# Patient Record
Sex: MALE | Race: WHITE | Employment: FULL TIME | URBAN - METROPOLITAN AREA
[De-identification: names, ages, dates, MRNs, and addresses within clinical notes are randomized per-mention and may not be internally consistent; named-entity substitution may affect disease eponyms.]

---

## 2020-03-12 ENCOUNTER — HOSPITAL ENCOUNTER (INPATIENT)
Age: 30
LOS: 2 days | Discharge: HOME | DRG: 638 | End: 2020-03-14
Attending: INTERNAL MEDICINE | Admitting: INTERNAL MEDICINE
Payer: COMMERCIAL

## 2020-03-12 ENCOUNTER — APPOINTMENT (OUTPATIENT)
Dept: GENERAL RADIOLOGY | Age: 30
DRG: 638 | End: 2020-03-12
Attending: INTERNAL MEDICINE
Payer: COMMERCIAL

## 2020-03-12 ENCOUNTER — APPOINTMENT (OUTPATIENT)
Dept: GENERAL RADIOLOGY | Age: 30
End: 2020-03-12
Payer: COMMERCIAL

## 2020-03-12 ENCOUNTER — HOSPITAL ENCOUNTER (EMERGENCY)
Age: 30
Discharge: HOME | End: 2020-03-12
Attending: FAMILY MEDICINE
Payer: COMMERCIAL

## 2020-03-12 ENCOUNTER — HOSPITAL ENCOUNTER (OUTPATIENT)
Age: 30
Discharge: HOME | End: 2020-03-12
Payer: COMMERCIAL

## 2020-03-12 VITALS
TEMPERATURE: 98.4 F | HEART RATE: 113 BPM | OXYGEN SATURATION: 100 % | BODY MASS INDEX: 31.39 KG/M2 | HEIGHT: 67 IN | SYSTOLIC BLOOD PRESSURE: 120 MMHG | DIASTOLIC BLOOD PRESSURE: 65 MMHG | RESPIRATION RATE: 24 BRPM | WEIGHT: 200 LBS

## 2020-03-12 PROBLEM — E11.10 DKA, TYPE 2, NOT AT GOAL (HCC): Status: ACTIVE | Noted: 2020-03-12

## 2020-03-12 LAB
ALBUMIN SERPL-MCNC: 5.3 G/DL (ref 3.5–5.2)
ALP BLD-CCNC: 110 U/L (ref 40–129)
ALT SERPL-CCNC: 19 U/L (ref 0–40)
AMPHETAMINE SCREEN, URINE: NOT DETECTED
ANION GAP SERPL CALCULATED.3IONS-SCNC: 24 MMOL/L (ref 7–16)
ANION GAP SERPL CALCULATED.3IONS-SCNC: 30 MMOL/L (ref 7–16)
ANION GAP SERPL CALCULATED.3IONS-SCNC: 30 MMOL/L (ref 7–16)
ANION GAP SERPL CALCULATED.3IONS-SCNC: 34 MMOL/L (ref 7–16)
AST SERPL-CCNC: 14 U/L (ref 0–39)
BACTERIA: ABNORMAL /HPF
BARBITURATE SCREEN URINE: NOT DETECTED
BASOPHILS ABSOLUTE: 0.14 E9/L (ref 0–0.2)
BASOPHILS RELATIVE PERCENT: 1.1 % (ref 0–2)
BENZODIAZEPINE SCREEN, URINE: NOT DETECTED
BETA-HYDROXYBUTYRATE: >4.5 MMOL/L (ref 0.02–0.27)
BILIRUB SERPL-MCNC: 0.7 MG/DL (ref 0–1.2)
BILIRUBIN URINE: ABNORMAL
BLOOD, URINE: ABNORMAL
BUN BLDV-MCNC: 10 MG/DL (ref 6–20)
BUN BLDV-MCNC: 11 MG/DL (ref 6–20)
BUN BLDV-MCNC: 6 MG/DL (ref 6–20)
BUN BLDV-MCNC: 8 MG/DL (ref 6–20)
CALCIUM SERPL-MCNC: 7.7 MG/DL (ref 8.6–10.2)
CALCIUM SERPL-MCNC: 8.1 MG/DL (ref 8.6–10.2)
CALCIUM SERPL-MCNC: 8.4 MG/DL (ref 8.6–10.2)
CALCIUM SERPL-MCNC: 9.8 MG/DL (ref 8.6–10.2)
CANNABINOID SCREEN URINE: POSITIVE
CHLORIDE BLD-SCNC: 100 MMOL/L (ref 98–107)
CHLORIDE BLD-SCNC: 106 MMOL/L (ref 98–107)
CHLORIDE BLD-SCNC: 92 MMOL/L (ref 98–107)
CHLORIDE BLD-SCNC: 97 MMOL/L (ref 98–107)
CHP ED QC CHECK: YES
CHP ED QC CHECK: YES
CLARITY: CLEAR
CO2: 3 MMOL/L (ref 22–29)
CO2: 5 MMOL/L (ref 22–29)
CO2: 6 MMOL/L (ref 22–29)
CO2: 6 MMOL/L (ref 22–29)
COCAINE METABOLITE SCREEN URINE: NOT DETECTED
COLOR: YELLOW
CREAT SERPL-MCNC: 0.8 MG/DL (ref 0.7–1.2)
CREAT SERPL-MCNC: 0.9 MG/DL (ref 0.7–1.2)
CREAT SERPL-MCNC: 0.9 MG/DL (ref 0.7–1.2)
CREAT SERPL-MCNC: 1 MG/DL (ref 0.7–1.2)
D DIMER: <200 NG/ML DDU
EKG ATRIAL RATE: 122 BPM
EKG P AXIS: 73 DEGREES
EKG P-R INTERVAL: 120 MS
EKG Q-T INTERVAL: 342 MS
EKG QRS DURATION: 86 MS
EKG QTC CALCULATION (BAZETT): 487 MS
EKG R AXIS: 63 DEGREES
EKG T AXIS: 52 DEGREES
EKG VENTRICULAR RATE: 122 BPM
EOSINOPHILS ABSOLUTE: 0.06 E9/L (ref 0.05–0.5)
EOSINOPHILS RELATIVE PERCENT: 0.5 % (ref 0–6)
FENTANYL SCREEN, URINE: NOT DETECTED
GFR AFRICAN AMERICAN: >60
GFR NON-AFRICAN AMERICAN: >60 ML/MIN/1.73
GLUCOSE BLD-MCNC: 106 MG/DL (ref 74–99)
GLUCOSE BLD-MCNC: 184 MG/DL
GLUCOSE BLD-MCNC: 209 MG/DL (ref 74–99)
GLUCOSE BLD-MCNC: 221 MG/DL
GLUCOSE BLD-MCNC: 303 MG/DL (ref 74–99)
GLUCOSE BLD-MCNC: 366 MG/DL (ref 74–99)
GLUCOSE URINE: 100 MG/DL
HBA1C MFR BLD: 11.7 % (ref 4–5.6)
HCT VFR BLD CALC: 52 % (ref 37–54)
HEMOGLOBIN: 18.8 G/DL (ref 12.5–16.5)
IMMATURE GRANULOCYTES #: 0.21 E9/L
IMMATURE GRANULOCYTES %: 1.6 % (ref 0–5)
INFLUENZA A BY PCR: NOT DETECTED
INFLUENZA B BY PCR: NOT DETECTED
KETONES, URINE: >=80 MG/DL
LACTIC ACID: 1.5 MMOL/L (ref 0.5–2.2)
LEUKOCYTE ESTERASE, URINE: NEGATIVE
LIPASE: 45 U/L (ref 13–60)
LYMPHOCYTES ABSOLUTE: 2.31 E9/L (ref 1.5–4)
LYMPHOCYTES RELATIVE PERCENT: 17.6 % (ref 20–42)
Lab: ABNORMAL
MAGNESIUM: 1.8 MG/DL (ref 1.6–2.6)
MCH RBC QN AUTO: 31.5 PG (ref 26–35)
MCHC RBC AUTO-ENTMCNC: 36.2 % (ref 32–34.5)
MCV RBC AUTO: 87.1 FL (ref 80–99.9)
METER GLUCOSE: 119 MG/DL (ref 74–99)
METER GLUCOSE: 148 MG/DL (ref 74–99)
METER GLUCOSE: 150 MG/DL (ref 74–99)
METER GLUCOSE: 158 MG/DL (ref 74–99)
METER GLUCOSE: 184 MG/DL (ref 74–99)
METER GLUCOSE: 221 MG/DL (ref 74–99)
METER GLUCOSE: 229 MG/DL (ref 74–99)
METHADONE SCREEN, URINE: NOT DETECTED
MONOCYTES ABSOLUTE: 0.93 E9/L (ref 0.1–0.95)
MONOCYTES RELATIVE PERCENT: 7.1 % (ref 2–12)
NEUTROPHILS ABSOLUTE: 9.46 E9/L (ref 1.8–7.3)
NEUTROPHILS RELATIVE PERCENT: 72.1 % (ref 43–80)
NITRITE, URINE: NEGATIVE
OPIATE SCREEN URINE: NOT DETECTED
OXYCODONE URINE: NOT DETECTED
PDW BLD-RTO: 13.2 FL (ref 11.5–15)
PH UA: 5.5 (ref 5–9)
PH VENOUS: 7.08 (ref 7.35–7.45)
PHENCYCLIDINE SCREEN URINE: NOT DETECTED
PHOSPHORUS: 1 MG/DL (ref 2.5–4.5)
PLATELET # BLD: 201 E9/L (ref 130–450)
PMV BLD AUTO: 10.2 FL (ref 7–12)
POTASSIUM SERPL-SCNC: 3.5 MMOL/L (ref 3.5–5)
POTASSIUM SERPL-SCNC: 4 MMOL/L (ref 3.5–5)
POTASSIUM SERPL-SCNC: 4.2 MMOL/L (ref 3.5–5)
POTASSIUM SERPL-SCNC: 4.7 MMOL/L (ref 3.5–5)
PROTEIN UA: 30 MG/DL
RBC # BLD: 5.97 E12/L (ref 3.8–5.8)
RBC UA: ABNORMAL /HPF (ref 0–2)
SODIUM BLD-SCNC: 131 MMOL/L (ref 132–146)
SODIUM BLD-SCNC: 133 MMOL/L (ref 132–146)
SODIUM BLD-SCNC: 133 MMOL/L (ref 132–146)
SODIUM BLD-SCNC: 136 MMOL/L (ref 132–146)
SPECIFIC GRAVITY UA: >=1.03 (ref 1–1.03)
STREP GRP A PCR: NEGATIVE
TOTAL PROTEIN: 8.5 G/DL (ref 6.4–8.3)
UROBILINOGEN, URINE: 0.2 E.U./DL
WBC # BLD: 13.1 E9/L (ref 4.5–11.5)
WBC UA: ABNORMAL /HPF (ref 0–5)

## 2020-03-12 PROCEDURE — 6360000002 HC RX W HCPCS: Performed by: INTERNAL MEDICINE

## 2020-03-12 PROCEDURE — 6360000002 HC RX W HCPCS: Performed by: FAMILY MEDICINE

## 2020-03-12 PROCEDURE — 71045 X-RAY EXAM CHEST 1 VIEW: CPT

## 2020-03-12 PROCEDURE — 0100U HC RESPIRPTHGN MULT REV TRANS & AMP PRB TECH 21 TRGT: CPT

## 2020-03-12 PROCEDURE — 2580000003 HC RX 258: Performed by: STUDENT IN AN ORGANIZED HEALTH CARE EDUCATION/TRAINING PROGRAM

## 2020-03-12 PROCEDURE — 2000000000 HC ICU R&B

## 2020-03-12 PROCEDURE — 87502 INFLUENZA DNA AMP PROBE: CPT

## 2020-03-12 PROCEDURE — 82962 GLUCOSE BLOOD TEST: CPT

## 2020-03-12 PROCEDURE — 85378 FIBRIN DEGRADE SEMIQUANT: CPT

## 2020-03-12 PROCEDURE — 2580000003 HC RX 258: Performed by: FAMILY MEDICINE

## 2020-03-12 PROCEDURE — 2500000003 HC RX 250 WO HCPCS

## 2020-03-12 PROCEDURE — 36415 COLL VENOUS BLD VENIPUNCTURE: CPT

## 2020-03-12 PROCEDURE — 80307 DRUG TEST PRSMV CHEM ANLYZR: CPT

## 2020-03-12 PROCEDURE — 02HV33Z INSERTION OF INFUSION DEVICE INTO SUPERIOR VENA CAVA, PERCUTANEOUS APPROACH: ICD-10-PCS | Performed by: INTERNAL MEDICINE

## 2020-03-12 PROCEDURE — 96368 THER/DIAG CONCURRENT INF: CPT

## 2020-03-12 PROCEDURE — 83690 ASSAY OF LIPASE: CPT

## 2020-03-12 PROCEDURE — 81001 URINALYSIS AUTO W/SCOPE: CPT

## 2020-03-12 PROCEDURE — 99291 CRITICAL CARE FIRST HOUR: CPT | Performed by: INTERNAL MEDICINE

## 2020-03-12 PROCEDURE — C9113 INJ PANTOPRAZOLE SODIUM, VIA: HCPCS | Performed by: FAMILY MEDICINE

## 2020-03-12 PROCEDURE — 87880 STREP A ASSAY W/OPTIC: CPT

## 2020-03-12 PROCEDURE — 96365 THER/PROPH/DIAG IV INF INIT: CPT

## 2020-03-12 PROCEDURE — 85025 COMPLETE CBC W/AUTO DIFF WBC: CPT

## 2020-03-12 PROCEDURE — 80053 COMPREHEN METABOLIC PANEL: CPT

## 2020-03-12 PROCEDURE — G0480 DRUG TEST DEF 1-7 CLASSES: HCPCS

## 2020-03-12 PROCEDURE — 96366 THER/PROPH/DIAG IV INF ADDON: CPT

## 2020-03-12 PROCEDURE — 87081 CULTURE SCREEN ONLY: CPT

## 2020-03-12 PROCEDURE — 87088 URINE BACTERIA CULTURE: CPT

## 2020-03-12 PROCEDURE — 6370000000 HC RX 637 (ALT 250 FOR IP): Performed by: INTERNAL MEDICINE

## 2020-03-12 PROCEDURE — 96375 TX/PRO/DX INJ NEW DRUG ADDON: CPT

## 2020-03-12 PROCEDURE — 6370000000 HC RX 637 (ALT 250 FOR IP): Performed by: FAMILY MEDICINE

## 2020-03-12 PROCEDURE — 93005 ELECTROCARDIOGRAM TRACING: CPT | Performed by: FAMILY MEDICINE

## 2020-03-12 PROCEDURE — 36592 COLLECT BLOOD FROM PICC: CPT

## 2020-03-12 PROCEDURE — 80048 BASIC METABOLIC PNL TOTAL CA: CPT

## 2020-03-12 PROCEDURE — A0426 ALS 1: HCPCS

## 2020-03-12 PROCEDURE — 2580000003 HC RX 258: Performed by: INTERNAL MEDICINE

## 2020-03-12 PROCEDURE — 93010 ELECTROCARDIOGRAM REPORT: CPT | Performed by: INTERNAL MEDICINE

## 2020-03-12 PROCEDURE — 99285 EMERGENCY DEPT VISIT HI MDM: CPT

## 2020-03-12 PROCEDURE — 2500000003 HC RX 250 WO HCPCS: Performed by: FAMILY MEDICINE

## 2020-03-12 PROCEDURE — 6360000002 HC RX W HCPCS

## 2020-03-12 PROCEDURE — 83735 ASSAY OF MAGNESIUM: CPT

## 2020-03-12 PROCEDURE — 83036 HEMOGLOBIN GLYCOSYLATED A1C: CPT

## 2020-03-12 PROCEDURE — 2500000003 HC RX 250 WO HCPCS: Performed by: INTERNAL MEDICINE

## 2020-03-12 PROCEDURE — 2580000003 HC RX 258

## 2020-03-12 PROCEDURE — 82010 KETONE BODYS QUAN: CPT

## 2020-03-12 PROCEDURE — 82800 BLOOD PH: CPT

## 2020-03-12 PROCEDURE — 84100 ASSAY OF PHOSPHORUS: CPT

## 2020-03-12 PROCEDURE — 2500000003 HC RX 250 WO HCPCS: Performed by: STUDENT IN AN ORGANIZED HEALTH CARE EDUCATION/TRAINING PROGRAM

## 2020-03-12 PROCEDURE — 83605 ASSAY OF LACTIC ACID: CPT

## 2020-03-12 PROCEDURE — A0425 GROUND MILEAGE: HCPCS

## 2020-03-12 RX ORDER — POTASSIUM CHLORIDE 29.8 MG/ML
20 INJECTION INTRAVENOUS PRN
Status: DISCONTINUED | OUTPATIENT
Start: 2020-03-12 | End: 2020-03-14 | Stop reason: HOSPADM

## 2020-03-12 RX ORDER — FENTANYL CITRATE 50 UG/ML
INJECTION, SOLUTION INTRAMUSCULAR; INTRAVENOUS
Status: COMPLETED
Start: 2020-03-12 | End: 2020-03-12

## 2020-03-12 RX ORDER — DEXTROSE MONOHYDRATE 25 G/50ML
12.5 INJECTION, SOLUTION INTRAVENOUS PRN
Status: DISCONTINUED | OUTPATIENT
Start: 2020-03-12 | End: 2020-03-14 | Stop reason: HOSPADM

## 2020-03-12 RX ORDER — 0.9 % SODIUM CHLORIDE 0.9 %
15 INTRAVENOUS SOLUTION INTRAVENOUS ONCE
Status: DISCONTINUED | OUTPATIENT
Start: 2020-03-12 | End: 2020-03-13

## 2020-03-12 RX ORDER — SODIUM CHLORIDE 0.9 % (FLUSH) 0.9 %
SYRINGE (ML) INJECTION
Status: COMPLETED
Start: 2020-03-12 | End: 2020-03-12

## 2020-03-12 RX ORDER — DEXTROSE, SODIUM CHLORIDE, AND POTASSIUM CHLORIDE 5; .45; .15 G/100ML; G/100ML; G/100ML
INJECTION INTRAVENOUS CONTINUOUS
Status: DISCONTINUED | OUTPATIENT
Start: 2020-03-12 | End: 2020-03-12

## 2020-03-12 RX ORDER — IPRATROPIUM BROMIDE AND ALBUTEROL SULFATE 2.5; .5 MG/3ML; MG/3ML
1 SOLUTION RESPIRATORY (INHALATION) ONCE
Status: COMPLETED | OUTPATIENT
Start: 2020-03-12 | End: 2020-03-12

## 2020-03-12 RX ORDER — MAGNESIUM SULFATE 1 G/100ML
1 INJECTION INTRAVENOUS PRN
Status: DISCONTINUED | OUTPATIENT
Start: 2020-03-12 | End: 2020-03-12 | Stop reason: SDUPTHER

## 2020-03-12 RX ORDER — DEXTROSE MONOHYDRATE 25 G/50ML
12.5 INJECTION, SOLUTION INTRAVENOUS PRN
Status: DISCONTINUED | OUTPATIENT
Start: 2020-03-12 | End: 2020-03-12 | Stop reason: SDUPTHER

## 2020-03-12 RX ORDER — POTASSIUM CHLORIDE 7.45 MG/ML
10 INJECTION INTRAVENOUS ONCE
Status: COMPLETED | OUTPATIENT
Start: 2020-03-12 | End: 2020-03-12

## 2020-03-12 RX ORDER — SODIUM CHLORIDE 9 MG/ML
INJECTION, SOLUTION INTRAVENOUS CONTINUOUS
Status: DISCONTINUED | OUTPATIENT
Start: 2020-03-12 | End: 2020-03-12

## 2020-03-12 RX ORDER — PANTOPRAZOLE SODIUM 40 MG/10ML
40 INJECTION, POWDER, LYOPHILIZED, FOR SOLUTION INTRAVENOUS ONCE
Status: COMPLETED | OUTPATIENT
Start: 2020-03-12 | End: 2020-03-12

## 2020-03-12 RX ORDER — POTASSIUM CHLORIDE 7.45 MG/ML
10 INJECTION INTRAVENOUS PRN
Status: DISCONTINUED | OUTPATIENT
Start: 2020-03-12 | End: 2020-03-12

## 2020-03-12 RX ORDER — 0.9 % SODIUM CHLORIDE 0.9 %
2000 INTRAVENOUS SOLUTION INTRAVENOUS ONCE
Status: COMPLETED | OUTPATIENT
Start: 2020-03-12 | End: 2020-03-12

## 2020-03-12 RX ORDER — 0.9 % SODIUM CHLORIDE 0.9 %
1000 INTRAVENOUS SOLUTION INTRAVENOUS ONCE
Status: COMPLETED | OUTPATIENT
Start: 2020-03-12 | End: 2020-03-12

## 2020-03-12 RX ORDER — FENTANYL CITRATE 50 UG/ML
25 INJECTION, SOLUTION INTRAMUSCULAR; INTRAVENOUS ONCE
Status: COMPLETED | OUTPATIENT
Start: 2020-03-12 | End: 2020-03-12

## 2020-03-12 RX ORDER — 0.9 % SODIUM CHLORIDE 0.9 %
15 INTRAVENOUS SOLUTION INTRAVENOUS ONCE
Status: DISCONTINUED | OUTPATIENT
Start: 2020-03-12 | End: 2020-03-12 | Stop reason: SDUPTHER

## 2020-03-12 RX ORDER — LORAZEPAM 2 MG/ML
2 INJECTION INTRAMUSCULAR ONCE
Status: COMPLETED | OUTPATIENT
Start: 2020-03-12 | End: 2020-03-12

## 2020-03-12 RX ORDER — DEXTROSE AND SODIUM CHLORIDE 5; .45 G/100ML; G/100ML
INJECTION, SOLUTION INTRAVENOUS CONTINUOUS PRN
Status: DISCONTINUED | OUTPATIENT
Start: 2020-03-12 | End: 2020-03-12

## 2020-03-12 RX ORDER — KETOROLAC TROMETHAMINE 30 MG/ML
30 INJECTION, SOLUTION INTRAMUSCULAR; INTRAVENOUS ONCE
Status: COMPLETED | OUTPATIENT
Start: 2020-03-12 | End: 2020-03-12

## 2020-03-12 RX ORDER — MAGNESIUM SULFATE 1 G/100ML
1 INJECTION INTRAVENOUS PRN
Status: DISCONTINUED | OUTPATIENT
Start: 2020-03-12 | End: 2020-03-13

## 2020-03-12 RX ORDER — DEXTROSE, SODIUM CHLORIDE, AND POTASSIUM CHLORIDE 5; .45; .15 G/100ML; G/100ML; G/100ML
INJECTION INTRAVENOUS ONCE
Status: DISCONTINUED | OUTPATIENT
Start: 2020-03-12 | End: 2020-03-12

## 2020-03-12 RX ORDER — DEXTROSE, SODIUM CHLORIDE, AND POTASSIUM CHLORIDE 5; .45; .15 G/100ML; G/100ML; G/100ML
INJECTION INTRAVENOUS ONCE
Status: DISCONTINUED | OUTPATIENT
Start: 2020-03-12 | End: 2020-03-12 | Stop reason: HOSPADM

## 2020-03-12 RX ORDER — DEXTROSE, SODIUM CHLORIDE, AND POTASSIUM CHLORIDE 5; .45; .15 G/100ML; G/100ML; G/100ML
INJECTION INTRAVENOUS ONCE
Status: COMPLETED | OUTPATIENT
Start: 2020-03-12 | End: 2020-03-12

## 2020-03-12 RX ORDER — DIMETHICONE, OXYBENZONE, AND PADIMATE O 2; 2.5; 6.6 G/100G; G/100G; G/100G
STICK TOPICAL PRN
Status: DISCONTINUED | OUTPATIENT
Start: 2020-03-12 | End: 2020-03-14 | Stop reason: HOSPADM

## 2020-03-12 RX ORDER — PROCHLORPERAZINE EDISYLATE 5 MG/ML
10 INJECTION INTRAMUSCULAR; INTRAVENOUS ONCE
Status: COMPLETED | OUTPATIENT
Start: 2020-03-12 | End: 2020-03-12

## 2020-03-12 RX ORDER — LORAZEPAM 2 MG/ML
INJECTION INTRAMUSCULAR
Status: COMPLETED
Start: 2020-03-12 | End: 2020-03-12

## 2020-03-12 RX ORDER — SODIUM CHLORIDE 9 MG/ML
INJECTION, SOLUTION INTRAVENOUS CONTINUOUS
Status: DISCONTINUED | OUTPATIENT
Start: 2020-03-12 | End: 2020-03-12 | Stop reason: SDUPTHER

## 2020-03-12 RX ADMIN — SODIUM CHLORIDE 2000 ML: 9 INJECTION, SOLUTION INTRAVENOUS at 08:10

## 2020-03-12 RX ADMIN — DEXTROSE, SODIUM CHLORIDE, AND POTASSIUM CHLORIDE: 5; .45; .15 INJECTION INTRAVENOUS at 13:51

## 2020-03-12 RX ADMIN — SODIUM CHLORIDE, PRESERVATIVE FREE 10 ML: 5 INJECTION INTRAVENOUS at 20:23

## 2020-03-12 RX ADMIN — FENTANYL CITRATE 25 MCG: 50 INJECTION, SOLUTION INTRAMUSCULAR; INTRAVENOUS at 18:52

## 2020-03-12 RX ADMIN — PANTOPRAZOLE SODIUM 40 MG: 40 INJECTION, POWDER, FOR SOLUTION INTRAVENOUS at 08:15

## 2020-03-12 RX ADMIN — SODIUM PHOSPHATE, MONOBASIC, MONOHYDRATE 20 MMOL: 276; 142 INJECTION, SOLUTION INTRAVENOUS at 21:43

## 2020-03-12 RX ADMIN — LORAZEPAM 2 MG: 2 INJECTION INTRAMUSCULAR; INTRAVENOUS at 18:52

## 2020-03-12 RX ADMIN — DEXTROSE, SODIUM CHLORIDE, AND POTASSIUM CHLORIDE: 5; .45; .15 INJECTION INTRAVENOUS at 13:40

## 2020-03-12 RX ADMIN — SODIUM CHLORIDE 1000 ML: 9 INJECTION, SOLUTION INTRAVENOUS at 11:45

## 2020-03-12 RX ADMIN — LORAZEPAM 2 MG: 2 INJECTION INTRAMUSCULAR at 18:52

## 2020-03-12 RX ADMIN — IPRATROPIUM BROMIDE AND ALBUTEROL SULFATE 1 AMPULE: .5; 3 SOLUTION RESPIRATORY (INHALATION) at 09:56

## 2020-03-12 RX ADMIN — SODIUM CHLORIDE 8.6 UNITS/HR: 9 INJECTION, SOLUTION INTRAVENOUS at 18:50

## 2020-03-12 RX ADMIN — POTASSIUM CHLORIDE 10 MEQ: 7.46 INJECTION, SOLUTION INTRAVENOUS at 11:42

## 2020-03-12 RX ADMIN — SODIUM BICARBONATE 150 MEQ: 84 INJECTION INTRAVENOUS at 15:36

## 2020-03-12 RX ADMIN — LIDOCAINE HYDROCHLORIDE: 20 SOLUTION ORAL; TOPICAL at 08:18

## 2020-03-12 RX ADMIN — INSULIN HUMAN 9 UNITS: 100 INJECTION, SOLUTION PARENTERAL at 11:53

## 2020-03-12 RX ADMIN — POTASSIUM CHLORIDE 20 MEQ: 29.8 INJECTION, SOLUTION INTRAVENOUS at 21:47

## 2020-03-12 RX ADMIN — SODIUM CHLORIDE 9.1 UNITS: 9 INJECTION, SOLUTION INTRAVENOUS at 12:02

## 2020-03-12 RX ADMIN — ENOXAPARIN SODIUM 40 MG: 40 INJECTION SUBCUTANEOUS at 20:29

## 2020-03-12 RX ADMIN — KETOROLAC TROMETHAMINE 30 MG: 30 INJECTION, SOLUTION INTRAMUSCULAR at 08:13

## 2020-03-12 RX ADMIN — SODIUM BICARBONATE: 84 INJECTION, SOLUTION INTRAVENOUS at 20:23

## 2020-03-12 RX ADMIN — Medication: at 23:57

## 2020-03-12 RX ADMIN — POTASSIUM CHLORIDE 20 MEQ: 29.8 INJECTION, SOLUTION INTRAVENOUS at 21:17

## 2020-03-12 RX ADMIN — PROCHLORPERAZINE EDISYLATE 10 MG: 5 INJECTION INTRAMUSCULAR; INTRAVENOUS at 08:13

## 2020-03-12 ASSESSMENT — PAIN DESCRIPTION - DESCRIPTORS
DESCRIPTORS: THROBBING;TIGHTNESS
DESCRIPTORS: CONSTANT;DISCOMFORT
DESCRIPTORS: HEADACHE;SORE
DESCRIPTORS: CRAMPING

## 2020-03-12 ASSESSMENT — PAIN DESCRIPTION - PAIN TYPE
TYPE: ACUTE PAIN

## 2020-03-12 ASSESSMENT — PAIN DESCRIPTION - LOCATION
LOCATION: HEAD;THROAT
LOCATION: HEAD;THROAT
LOCATION: ABDOMEN
LOCATION: HEAD

## 2020-03-12 ASSESSMENT — PAIN SCALES - GENERAL
PAINLEVEL_OUTOF10: 0
PAINLEVEL_OUTOF10: 2
PAINLEVEL_OUTOF10: 4
PAINLEVEL_OUTOF10: 6
PAINLEVEL_OUTOF10: 0
PAINLEVEL_OUTOF10: 2
PAINLEVEL_OUTOF10: 6
PAINLEVEL_OUTOF10: 0
PAINLEVEL_OUTOF10: 2

## 2020-03-12 ASSESSMENT — PAIN DESCRIPTION - ONSET
ONSET: ON-GOING
ONSET: ON-GOING
ONSET: GRADUAL

## 2020-03-12 ASSESSMENT — PAIN DESCRIPTION - FREQUENCY
FREQUENCY: CONTINUOUS
FREQUENCY: CONTINUOUS
FREQUENCY: INTERMITTENT
FREQUENCY: CONTINUOUS

## 2020-03-12 ASSESSMENT — PAIN DESCRIPTION - ORIENTATION: ORIENTATION: RIGHT;LEFT

## 2020-03-12 ASSESSMENT — PAIN - FUNCTIONAL ASSESSMENT: PAIN_FUNCTIONAL_ASSESSMENT: 0-10

## 2020-03-12 ASSESSMENT — PAIN DESCRIPTION - PROGRESSION
CLINICAL_PROGRESSION: GRADUALLY IMPROVING
CLINICAL_PROGRESSION: NOT CHANGED

## 2020-03-12 NOTE — CONSULTS
polydipsia  MUSCULOSKELETAL:  negative  NEUROLOGICAL:  negative  BEHAVIOR/PSYCH:  negative    Lines and Devices   Right femoral central line placed 2020    Mechanical Ventilation Data   VENT SETTINGS (Comprehensive)     Additional Respiratory  Assessments  Pulse: 112  Resp: 23  SpO2: 99 %    ABG  Lab Results   Component Value Date    PH 7.349 2016    PCO2 35.4 2016    PO2 80.8 2016    HCO3 19.1 2016    O2SAT 96.1 2016     Lab Results   Component Value Date    MODE NC- 5 L 2016           Vitals    height is 5' 7\" (1.702 m) and weight is 189 lb 13.1 oz (86.1 kg). His blood pressure is 126/85 and his pulse is 112. His respiration is 23 and oxygen saturation is 99%. Temperature Range:   Temp  Av.4 °F (36.9 °C)  Min: 98.4 °F (36.9 °C)  Max: 98.5 °F (36.9 °C)  BP Range:  Systolic (49BDI), TMP:330 , Min:120 , ZQH:595     Diastolic (00SSR), XDD:40, Min:65, Max:98    Pulse Range: Pulse  Av.1  Min: 100  Max: 134  Respiration Range: Resp  Av.7  Min: 23  Max: 30  Current Pulse Ox[de-identified]  SpO2: 99 %  24HR Pulse Ox Range:  SpO2  Av.4 %  Min: 98 %  Max: 100 %  Oxygen Amount and Delivery:        I/O (24 Hours)    Patient Vitals for the past 8 hrs:   BP Pulse Resp SpO2 Height Weight   20 1900 126/85 112 23 99 % -- --   20 1700 -- -- -- -- 5' 7\" (1.702 m) 189 lb 13.1 oz (86.1 kg)     No intake or output data in the 24 hours ending 20  No intake/output data recorded. Patient Vitals for the past 96 hrs (Last 3 readings):   Weight   20 1700 189 lb 13.1 oz (86.1 kg)         Drains/Tubes Outputs  n/a  Exam         PHYSICAL EXAM:    General appearance - alert, well appearing, and in no distress and oriented to person, place, and time. Anxious appearing.   Mental status - alert, oriented to person, place, and time, normal mood, behavior, speech, dress, motor activity, and thought processes  Eyes - pupils equal and reactive, extraocular eye 2020    LACTA 1.1 2015        BNP   No results for input(s): BNP in the last 72 hours. Cultures     No results for input(s): BC in the last 72 hours. No results for input(s): Alona Amado in the last 72 hours. No results for input(s): LABURIN in the last 72 hours. Radiology   Xr Chest Portable    Result Date: 3/12/2020  Patient MRN:  [id number] : 1990 Age: 30 years Gender: Male Order Date:  3/12/2020 8:00 AM EXAM: XR CHEST PORTABLE NUMBER OF IMAGES:  1 INDICATION:  sob sob COMPARISON: 2016 FINDINGS: The heart is normal in size. There is a normal appearance to the pulmonary vasculature. No focal airspace opacity or evidence of pleural effusion. No pneumothorax. No free air beneath the diaphragm. No airspace opacities or pleural effusion. SYSTEMS ASSESSMENT    Neuro   Awake and alert. Mildly anxious. Denies drinking alcohol recently to me though has had DTs in the past so will continue to monitor. Respiratory   Mild tachypnea likely related to acidosis. Chest x-ray showed no pneumonia, no pneumothorax, no signs of pneumomediastinum. Wean oxygen as tolerated. Keep O2 sat 90-92%    Cardiovascular   Tachycardia. Sinus rhythm. No chest pain. No cardiac risk factors. Gastrointestinal   Continues to have nausea. Treat with antiemetic as needed. Advance diet after gap is closed. Renal   Normal renal function. Continue to monitor potassium which is at this time normal while he is getting insulin. Infectious Disease   No fever. Influenza testing negative. Afebrile for us here. Strep screen sent as he is complaining of sore throat. No recent sick contacts. No travel history. No known exposure to the novel coronavirus    Hematology/Oncology   Mild leukocytosis. Patient is not anemic. H&H from [place] shows hemoglobin of 18, likely secondary to dehydration. We will continue to trend. Endocrine   DKA.   Proceed with insulin and bicarb per DKA

## 2020-03-12 NOTE — ED PROVIDER NOTES
Department of Emergency Medicine   ED  Provider Note  Admit Date/RoomTime: 3/12/2020  7:04 AM  ED Room: 05/05  Chief Complaint     Shortness of Breath (SOB with vomiting, tightness in throat, headache, and heartburn, symptoms started Friday)    History of Present Illness   Source of history provided by:  patient. History/Exam Limitations: none. Armando Payan is a 30 y.o. old male who has a past medical history of:   Past Medical History:   Diagnosis Date    Alcohol abuse     Allergic rhinitis 3/9/2015    Anxiety     Asthma     Bipolar 2 disorder ([address] 3/9/2015    Depression     Generalized anxiety disorder 3/4/2016    With agoraphobia    High anion gap metabolic acidosis 3/76/1900    History of tobacco abuse 2/25/2016    Plantar fasciitis of left foot 3/3/2016    Prolonged INR 2/19/2016    Splenomegaly 3/4/2016     3/2016    presents to the emergency department by private vehicle and ambulatory, for complaints of gradual onset, still present aching, burning pain in the epigastrium without radiation which began 6 day(s) prior to arrival.  There has been similar episodes in the past .   Since onset the symptoms have been persistent. The pain is associated with Nausea and vomiting chest pain and shortness of breath and most recent headache frontal.  The pain is aggravated by eating and drinking cold drinks and relieved by nothing. There has been NO chills, diarrhea, dysuria, hematuria or Cough fever chills patient denies any alcohol use at this time he does vape. .    . ROS   Pertinent positives and negatives are stated within HPI, all other systems reviewed and are negative. Past Surgical History:   Procedure Laterality Date    BRONCHOSCOPY  02/16/2016   Social History:  reports that he has been smoking cigarettes. He has been smoking about 0.25 packs per day. He does not have any smokeless tobacco history on file.  He reports current alcohol use of about 40.0 standard drinks of alcohol per week. He reports that he does not use drugs. Family History: family history includes Other in his father. Allergies: Patient has no known allergies. Physical Exam           ED Triage Vitals   BP Temp Temp src Pulse Resp SpO2 Height Weight   03/12/20 0713 03/12/20 0713 -- 03/12/20 0713 03/12/20 0713 03/12/20 0713 03/12/20 0707 03/12/20 0707   (!) 160/98 98.5 °F (36.9 °C)  134 28 98 % 5' 7\" (1.702 m) 200 lb (90.7 kg)      Oxygen Saturation Interpretation: Normal.    · General Appearance/Constitutional:  Alert, development consistent with age. · HEENT:  NC/NT. PERRLA. Airway patent. · Neck:  Supple. No lymphadenopathy. · Respiratory: Lungs Clear to auscultation and breath sounds equal.  · CV:  Regular rate and rhythm. · GI:  General Appearance: normal.         Bowel sounds: normal bowel sounds. Distension:  None. Tenderness: mild tenderness is present in the epigastrium. Liver: non-tender. Spleen:  non-tender. Pulsatile Mass: absent. Hernia:  no inguinal or femoral hernias noted. · Back: CVA Tenderness: No.  · Integument:  Normal turgor. Warm, dry, without visible rash, unless noted elsewhere. · Neurological:  Orientation age-appropriate. Motor functions intact.     Lab / Imaging Results   (All laboratory and radiology results have been personally reviewed by myself)  Labs:  Results for orders placed or performed during the hospital encounter of 03/12/20   CBC Auto Differential   Result Value Ref Range    WBC 13.1 (H) 4.5 - 11.5 E9/L    RBC 5.97 (H) 3.80 - 5.80 E12/L    Hemoglobin 18.8 (H) 12.5 - 16.5 g/dL    Hematocrit 52.0 37.0 - 54.0 %    MCV 87.1 80.0 - 99.9 fL    MCH 31.5 26.0 - 35.0 pg    MCHC 36.2 (H) 32.0 - 34.5 %    RDW 13.2 11.5 - 15.0 fL    Platelets 861 447 - 758 E9/L    MPV 10.2 7.0 - 12.0 fL    Neutrophils % 72.1 43.0 - 80.0 %    Immature Granulocytes % 1.6 0.0 - 5.0 %    Lymphocytes % 17.6 (L) 20.0 - 42.0 %    Monocytes % 7.1 2.0 - 12.0 %    Eosinophils % 0.5 0.0 - 6.0 %    Basophils % 1.1 0.0 - 2.0 %    Neutrophils Absolute 9.46 (H) 1.80 - 7.30 E9/L    Immature Granulocytes # 0.21 E9/L    Lymphocytes Absolute 2.31 1.50 - 4.00 E9/L    Monocytes Absolute 0.93 0.10 - 0.95 E9/L    Eosinophils Absolute 0.06 0.05 - 0.50 E9/L    Basophils Absolute 0.14 0.00 - 0.20 E9/L   Comprehensive Metabolic Panel   Result Value Ref Range    Sodium 131 (L) 132 - 146 mmol/L    Potassium 4.2 3.5 - 5.0 mmol/L    Chloride 92 (L) 98 - 107 mmol/L    CO2 5 (LL) 22 - 29 mmol/L    Anion Gap 34 (H) 7 - 16 mmol/L    Glucose 366 (H) 74 - 99 mg/dL    BUN 11 6 - 20 mg/dL    CREATININE 1.0 0.7 - 1.2 mg/dL    GFR Non-African American >60 >=60 mL/min/1.73    GFR African American >60     Calcium 9.8 8.6 - 10.2 mg/dL    Total Protein 8.5 (H) 6.4 - 8.3 g/dL    Alb 5.3 (H) 3.5 - 5.2 g/dL    Total Bilirubin 0.7 0.0 - 1.2 mg/dL    Alkaline Phosphatase 110 40 - 129 U/L    ALT 19 0 - 40 U/L    AST 14 0 - 39 U/L   Lipase   Result Value Ref Range    Lipase 45 13 - 60 U/L   Lactic Acid, Plasma   Result Value Ref Range    Lactic Acid 1.5 0.5 - 2.2 mmol/L   D-Dimer, Quantitative   Result Value Ref Range    D-Dimer, Quant <200 ng/mL DDU   Basic Metabolic Panel   Result Value Ref Range    Sodium 133 132 - 146 mmol/L    Potassium 4.7 3.5 - 5.0 mmol/L    Chloride 100 98 - 107 mmol/L    CO2 3 (LL) 22 - 29 mmol/L    Anion Gap 30 (H) 7 - 16 mmol/L    Glucose 303 (H) 74 - 99 mg/dL    BUN 10 6 - 20 mg/dL    CREATININE 0.9 0.7 - 1.2 mg/dL    GFR Non-African American >60 >=60 mL/min/1.73    GFR African American >60     Calcium 8.4 (L) 8.6 - 10.2 mg/dL   Beta-Hydroxybutyrate   Result Value Ref Range    Beta-Hydroxybutyrate >4.50 (H) 0.02 - 0.27 mmol/L   PH, VENOUS   Result Value Ref Range    pH, Yuri 7.08 (LL) 7.35 - 7.45   POCT Glucose   Result Value Ref Range    Meter Glucose 119 (H) 74 - 99 mg/dL   EKG 12 Lead   Result Value Ref Range    Ventricular Rate 122 BPM

## 2020-03-13 LAB
ACETAMINOPHEN LEVEL: <5 MCG/ML (ref 10–30)
ADENOVIRUS BY PCR: NOT DETECTED
ANION GAP SERPL CALCULATED.3IONS-SCNC: 14 MMOL/L (ref 7–16)
ANION GAP SERPL CALCULATED.3IONS-SCNC: 15 MMOL/L (ref 7–16)
ANION GAP SERPL CALCULATED.3IONS-SCNC: 15 MMOL/L (ref 7–16)
ANION GAP SERPL CALCULATED.3IONS-SCNC: 21 MMOL/L (ref 7–16)
BORDETELLA PARAPERTUSSIS BY PCR: NOT DETECTED
BORDETELLA PERTUSSIS BY PCR: NOT DETECTED
BUN BLDV-MCNC: 5 MG/DL (ref 6–20)
BUN BLDV-MCNC: 6 MG/DL (ref 6–20)
CALCIUM SERPL-MCNC: 7.4 MG/DL (ref 8.6–10.2)
CALCIUM SERPL-MCNC: 7.7 MG/DL (ref 8.6–10.2)
CALCIUM SERPL-MCNC: 7.7 MG/DL (ref 8.6–10.2)
CALCIUM SERPL-MCNC: 7.8 MG/DL (ref 8.6–10.2)
CHLAMYDOPHILIA PNEUMONIAE BY PCR: NOT DETECTED
CHLORIDE BLD-SCNC: 101 MMOL/L (ref 98–107)
CHLORIDE BLD-SCNC: 102 MMOL/L (ref 98–107)
CHLORIDE BLD-SCNC: 102 MMOL/L (ref 98–107)
CHLORIDE BLD-SCNC: 104 MMOL/L (ref 98–107)
CO2: 12 MMOL/L (ref 22–29)
CO2: 18 MMOL/L (ref 22–29)
CO2: 18 MMOL/L (ref 22–29)
CO2: 19 MMOL/L (ref 22–29)
CORONAVIRUS 229E BY PCR: NOT DETECTED
CORONAVIRUS HKU1 BY PCR: NOT DETECTED
CORONAVIRUS NL63 BY PCR: NOT DETECTED
CORONAVIRUS OC43 BY PCR: NOT DETECTED
CREAT SERPL-MCNC: 0.6 MG/DL (ref 0.7–1.2)
CREAT SERPL-MCNC: 0.6 MG/DL (ref 0.7–1.2)
CREAT SERPL-MCNC: 0.7 MG/DL (ref 0.7–1.2)
CREAT SERPL-MCNC: 0.8 MG/DL (ref 0.7–1.2)
ETHANOL: <10 MG/DL (ref 0–0.08)
GFR AFRICAN AMERICAN: >60
GFR NON-AFRICAN AMERICAN: >60 ML/MIN/1.73
GLUCOSE BLD-MCNC: 140 MG/DL (ref 74–99)
GLUCOSE BLD-MCNC: 159 MG/DL (ref 74–99)
GLUCOSE BLD-MCNC: 173 MG/DL (ref 74–99)
GLUCOSE BLD-MCNC: 191 MG/DL (ref 74–99)
HCT VFR BLD CALC: 36.6 % (ref 37–54)
HEMOGLOBIN: 13.4 G/DL (ref 12.5–16.5)
HUMAN METAPNEUMOVIRUS BY PCR: NOT DETECTED
HUMAN RHINOVIRUS/ENTEROVIRUS BY PCR: NOT DETECTED
INFLUENZA A BY PCR: NOT DETECTED
INFLUENZA B BY PCR: NOT DETECTED
MAGNESIUM: 1.7 MG/DL (ref 1.6–2.6)
MAGNESIUM: 1.8 MG/DL (ref 1.6–2.6)
MAGNESIUM: 1.9 MG/DL (ref 1.6–2.6)
MCH RBC QN AUTO: 31.5 PG (ref 26–35)
MCHC RBC AUTO-ENTMCNC: 36.6 % (ref 32–34.5)
MCV RBC AUTO: 85.9 FL (ref 80–99.9)
METER GLUCOSE: 121 MG/DL (ref 74–99)
METER GLUCOSE: 135 MG/DL (ref 74–99)
METER GLUCOSE: 151 MG/DL (ref 74–99)
METER GLUCOSE: 152 MG/DL (ref 74–99)
METER GLUCOSE: 153 MG/DL (ref 74–99)
METER GLUCOSE: 155 MG/DL (ref 74–99)
METER GLUCOSE: 169 MG/DL (ref 74–99)
METER GLUCOSE: 174 MG/DL (ref 74–99)
METER GLUCOSE: 190 MG/DL (ref 74–99)
METER GLUCOSE: 197 MG/DL (ref 74–99)
METER GLUCOSE: 202 MG/DL (ref 74–99)
METER GLUCOSE: 208 MG/DL (ref 74–99)
METER GLUCOSE: 221 MG/DL (ref 74–99)
METER GLUCOSE: 260 MG/DL (ref 74–99)
METER GLUCOSE: >500 MG/DL (ref 74–99)
MYCOPLASMA PNEUMONIAE BY PCR: NOT DETECTED
PARAINFLUENZA VIRUS 1 BY PCR: NOT DETECTED
PARAINFLUENZA VIRUS 2 BY PCR: NOT DETECTED
PARAINFLUENZA VIRUS 3 BY PCR: NOT DETECTED
PARAINFLUENZA VIRUS 4 BY PCR: NOT DETECTED
PDW BLD-RTO: 13.5 FL (ref 11.5–15)
PHOSPHORUS: 1.8 MG/DL (ref 2.5–4.5)
PHOSPHORUS: 1.8 MG/DL (ref 2.5–4.5)
PHOSPHORUS: 1.9 MG/DL (ref 2.5–4.5)
PLATELET # BLD: 69 E9/L (ref 130–450)
PLATELET CONFIRMATION: NORMAL
PMV BLD AUTO: 9.7 FL (ref 7–12)
POTASSIUM SERPL-SCNC: 3.3 MMOL/L (ref 3.5–5)
POTASSIUM SERPL-SCNC: 3.4 MMOL/L (ref 3.5–5)
POTASSIUM SERPL-SCNC: 3.4 MMOL/L (ref 3.5–5)
POTASSIUM SERPL-SCNC: 3.7 MMOL/L (ref 3.5–5)
RBC # BLD: 4.26 E12/L (ref 3.8–5.8)
RESPIRATORY SYNCYTIAL VIRUS BY PCR: NOT DETECTED
SALICYLATE, SERUM: <0.3 MG/DL (ref 0–30)
SODIUM BLD-SCNC: 135 MMOL/L (ref 132–146)
SODIUM BLD-SCNC: 136 MMOL/L (ref 132–146)
TRICYCLIC ANTIDEPRESSANTS SCREEN SERUM: NEGATIVE NG/ML
WBC # BLD: 3.8 E9/L (ref 4.5–11.5)

## 2020-03-13 PROCEDURE — 6370000000 HC RX 637 (ALT 250 FOR IP): Performed by: INTERNAL MEDICINE

## 2020-03-13 PROCEDURE — 1200000000 HC SEMI PRIVATE

## 2020-03-13 PROCEDURE — 82962 GLUCOSE BLOOD TEST: CPT

## 2020-03-13 PROCEDURE — 2500000003 HC RX 250 WO HCPCS: Performed by: STUDENT IN AN ORGANIZED HEALTH CARE EDUCATION/TRAINING PROGRAM

## 2020-03-13 PROCEDURE — 6360000002 HC RX W HCPCS: Performed by: INTERNAL MEDICINE

## 2020-03-13 PROCEDURE — 80048 BASIC METABOLIC PNL TOTAL CA: CPT

## 2020-03-13 PROCEDURE — 2580000003 HC RX 258: Performed by: INTERNAL MEDICINE

## 2020-03-13 PROCEDURE — 36592 COLLECT BLOOD FROM PICC: CPT

## 2020-03-13 PROCEDURE — 83735 ASSAY OF MAGNESIUM: CPT

## 2020-03-13 PROCEDURE — 84100 ASSAY OF PHOSPHORUS: CPT

## 2020-03-13 PROCEDURE — 2580000003 HC RX 258: Performed by: STUDENT IN AN ORGANIZED HEALTH CARE EDUCATION/TRAINING PROGRAM

## 2020-03-13 PROCEDURE — 99233 SBSQ HOSP IP/OBS HIGH 50: CPT | Performed by: INTERNAL MEDICINE

## 2020-03-13 PROCEDURE — 2500000003 HC RX 250 WO HCPCS: Performed by: INTERNAL MEDICINE

## 2020-03-13 PROCEDURE — 85027 COMPLETE CBC AUTOMATED: CPT

## 2020-03-13 PROCEDURE — 36415 COLL VENOUS BLD VENIPUNCTURE: CPT

## 2020-03-13 RX ORDER — INSULIN GLARGINE 100 [IU]/ML
16 INJECTION, SOLUTION SUBCUTANEOUS NIGHTLY
Status: DISCONTINUED | OUTPATIENT
Start: 2020-03-13 | End: 2020-03-14 | Stop reason: HOSPADM

## 2020-03-13 RX ORDER — HEPARIN SODIUM (PORCINE) LOCK FLUSH IV SOLN 100 UNIT/ML 100 UNIT/ML
3 SOLUTION INTRAVENOUS PRN
Status: DISCONTINUED | OUTPATIENT
Start: 2020-03-13 | End: 2020-03-14 | Stop reason: HOSPADM

## 2020-03-13 RX ORDER — SODIUM BICARBONATE 650 MG/1
650 TABLET ORAL 4 TIMES DAILY
Status: COMPLETED | OUTPATIENT
Start: 2020-03-13 | End: 2020-03-14

## 2020-03-13 RX ORDER — HEPARIN SODIUM (PORCINE) LOCK FLUSH IV SOLN 100 UNIT/ML 100 UNIT/ML
3 SOLUTION INTRAVENOUS EVERY 12 HOURS
Status: DISCONTINUED | OUTPATIENT
Start: 2020-03-13 | End: 2020-03-14 | Stop reason: HOSPADM

## 2020-03-13 RX ORDER — SODIUM CHLORIDE 0.9 % (FLUSH) 0.9 %
10 SYRINGE (ML) INJECTION 2 TIMES DAILY
Status: DISCONTINUED | OUTPATIENT
Start: 2020-03-13 | End: 2020-03-14 | Stop reason: HOSPADM

## 2020-03-13 RX ADMIN — POTASSIUM CHLORIDE 20 MEQ: 29.8 INJECTION, SOLUTION INTRAVENOUS at 10:31

## 2020-03-13 RX ADMIN — INSULIN LISPRO 2 UNITS: 100 INJECTION, SOLUTION INTRAVENOUS; SUBCUTANEOUS at 21:12

## 2020-03-13 RX ADMIN — SODIUM CHLORIDE, PRESERVATIVE FREE 10 ML: 5 INJECTION INTRAVENOUS at 08:25

## 2020-03-13 RX ADMIN — POTASSIUM CHLORIDE 20 MEQ: 29.8 INJECTION, SOLUTION INTRAVENOUS at 06:22

## 2020-03-13 RX ADMIN — POTASSIUM CHLORIDE 20 MEQ: 29.8 INJECTION, SOLUTION INTRAVENOUS at 02:09

## 2020-03-13 RX ADMIN — POTASSIUM CHLORIDE 20 MEQ: 29.8 INJECTION, SOLUTION INTRAVENOUS at 05:18

## 2020-03-13 RX ADMIN — SODIUM BICARBONATE 650 MG: 650 TABLET ORAL at 12:55

## 2020-03-13 RX ADMIN — SODIUM BICARBONATE: 84 INJECTION, SOLUTION INTRAVENOUS at 05:00

## 2020-03-13 RX ADMIN — POTASSIUM CHLORIDE 20 MEQ: 29.8 INJECTION, SOLUTION INTRAVENOUS at 09:12

## 2020-03-13 RX ADMIN — SODIUM PHOSPHATE, MONOBASIC, MONOHYDRATE 15 MMOL: 276; 142 INJECTION, SOLUTION INTRAVENOUS at 06:02

## 2020-03-13 RX ADMIN — POTASSIUM CHLORIDE 20 MEQ: 29.8 INJECTION, SOLUTION INTRAVENOUS at 00:50

## 2020-03-13 RX ADMIN — SODIUM BICARBONATE 650 MG: 650 TABLET ORAL at 18:30

## 2020-03-13 RX ADMIN — ENOXAPARIN SODIUM 40 MG: 40 INJECTION SUBCUTANEOUS at 21:11

## 2020-03-13 RX ADMIN — SODIUM BICARBONATE 650 MG: 650 TABLET ORAL at 21:11

## 2020-03-13 RX ADMIN — SODIUM CHLORIDE, PRESERVATIVE FREE 10 ML: 5 INJECTION INTRAVENOUS at 21:19

## 2020-03-13 RX ADMIN — SODIUM CHLORIDE, PRESERVATIVE FREE 10 ML: 5 INJECTION INTRAVENOUS at 15:20

## 2020-03-13 RX ADMIN — INSULIN LISPRO 2 UNITS: 100 INJECTION, SOLUTION INTRAVENOUS; SUBCUTANEOUS at 18:31

## 2020-03-13 RX ADMIN — INSULIN GLARGINE 16 UNITS: 100 INJECTION, SOLUTION SUBCUTANEOUS at 11:27

## 2020-03-13 ASSESSMENT — PAIN SCALES - GENERAL
PAINLEVEL_OUTOF10: 0

## 2020-03-13 NOTE — PROGRESS NOTES
Per patient and mother Pt is planning on going to Dr. Joseph Lira for primary after discharge. Patient was instructed on drawing up and administrating insulin. Patient competently gertrudis up insulin and gave a self injection.

## 2020-03-13 NOTE — PLAN OF CARE
Problem: Fluid Volume:  Goal: Signs and symptoms of dehydration will decrease  Description: Signs and symptoms of dehydration will decrease  3/13/2020 0135 by Amira Staley RN  Outcome: Met This Shift  3/12/2020 2127 by Amira Staley RN  Outcome: Not Met This Shift  Goal: Ability to achieve a balanced intake and output will improve  Description: Ability to achieve a balanced intake and output will improve  3/13/2020 0135 by Amira Staley RN  Outcome: Met This Shift  3/12/2020 2127 by Amira Staley RN  Outcome: Met This Shift  Goal: Diagnostic test results will improve  Description: Diagnostic test results will improve  3/13/2020 0135 by Amira Staley RN  Outcome: Met This Shift  3/12/2020 2127 by Amira Staley RN  Outcome: Not Met This Shift

## 2020-03-13 NOTE — H&P
[facility] History and Physical      CHIEF COMPLAINT: Abdominal pain nausea vomiting    History of Present Illness:    21-year-old male presented with over a week history of \" not feeling well\", patient reports that he has been feeling thirsty and drinking a lot of water as well as increased frequency with urination, he felt that his throat was dry and is feeling warm neck, reported having epigastric abdominal cramps and pain worsened when he was trying to eat or drink, he denied any fever but reported some chills, he denied any sick contacts or recent travels, no nasal congestion no shortness of breath, he reported that for over a week he has been feeling some palpitation in his chest but no chest pain, he also reports some cramps in his legs and feet    Informant(s) for H&P: Patient    REVIEW OF SYSTEMS:  A comprehensive review of systems was negative except for: what is in the HPI      PMH:  Past Medical History:   Diagnosis Date    Alcohol abuse     Allergic rhinitis 3/9/2015    Anxiety     Asthma     Bipolar 2 disorder ([address] 3/9/2015    Depression     Generalized anxiety disorder 3/4/2016    With agoraphobia    High anion gap metabolic acidosis 4/22/0700    History of tobacco abuse 2/25/2016    Plantar fasciitis of left foot 3/3/2016    Prolonged INR 2/19/2016    Splenomegaly 3/4/2016    US 3/2016       Surgical History:  Past Surgical History:   Procedure Laterality Date    BRONCHOSCOPY  02/16/2016       Medications Prior to Admission:    Prior to Admission medications    Not on File       Allergies:    Patient has no known allergies. Social History:    reports that he has been smoking cigarettes. He has been smoking about 0.25 packs per day. He does not have any smokeless tobacco history on file. He reports current alcohol use of about 40.0 standard drinks of alcohol per week. He reports that he does not use drugs.     Family History:   family history includes Other in his father. PHYSICAL EXAM:  Vitals:  /85   Pulse 112   Resp 23   Ht 5' 7\" (1.702 m)   Wt 189 lb 13.1 oz (86.1 kg)   SpO2 99%   BMI 29.73 kg/m²     General Appearance: alert and oriented to person, place and time and in moderate acute distress  Skin: warm and dry  Head: normocephalic and atraumatic  Eyes: pupils equal, round, and reactive to light, extraocular eye movements intact, conjunctivae normal  Neck: neck supple and non tender without mass   Pulmonary/Chest: clear to auscultation bilaterally- no wheezes, rales or rhonchi, normal air movement, no respiratory distress  Cardiovascular: normal rate, normal S1 and S2 and no carotid bruits  Abdomen: soft, non-tender, non-distended, normal bowel sounds, no masses or organomegaly  Extremities: no cyanosis, no clubbing and no edema  Neurologic: no cranial nerve deficit and speech normal        LABS:  Recent Labs     03/12/20  0825 03/12/20  1039 03/12/20  1352 03/12/20  1542 03/12/20  1625   * 133 136  --   --    K 4.2 4.7 4.0  --   --    CL 92* 100 106  --   --    CO2 5* 3* 6*  --   --    BUN 11 10 8  --   --    CREATININE 1.0 0.9 0.8  --   --    GLUCOSE 366* 303* 106* 184 221   CALCIUM 9.8 8.4* 8.1*  --   --        Recent Labs     03/12/20  0825   WBC 13.1*   RBC 5.97*   HGB 18.8*   HCT 52.0   MCV 87.1   MCH 31.5   MCHC 36.2*   RDW 13.2      MPV 10.2       No results for input(s): POCGLU in the last 72 hours. Radiology:   XR CHEST PORTABLE   Final Result   No acute airspace disease. EKG: sinus tahcycardia    ASSESSMENT:      Active Problems:    DKA, type 2, not at goal [facility])  Resolved Problems:    * No resolved hospital problems. *      PLAN:    1. DKA probably type II, question due to chronic pancreatitis given the patient had pancreatitis twice. Presented with a glucose of 366, bicarb of 5 with elevated beta hydroxybutyrate more than 4.5, pH of 7.08 with anion gap at 34.   Might have a component of starvation

## 2020-03-13 NOTE — PROGRESS NOTES
Denies any fevers. Respiratory panel negative. Still denies cough or any fever. No respiratory complaints. No significant overnight events.     AWAKE & FOLLOWING COMMANDS:  [] No   [x] Yes    CURRENT VENTILATION STATUS:     [] Ventilator  [] BIPAP  [] Nasal Cannula [x] Room Air      IF INTUBATED, ET TUBE MARKING AT LOWER LIP:       cms    SECRETIONS Amount:  [] Small [] Moderate  [] Large  [x] None  Color:     [] White [] Colored  [] Bloody    SEDATION:  RAAS Score:  [] Propofol gtt  [] Versed gtt  [] Ativan gtt   [] No Sedation    PARALYZED:  [x] No    [] Yes    DIARRHEA:                [x] No                [] Yes  (C. Difficile status: [] positive                                                                                                                       [] negative                                                                                                                     [] pending)    VASOPRESSORS:  [x] No    [] Yes    If yes -   [] Levophed       [] Dopamine     [] Vasopressin       [] Dobutamine  [] Phenylephrine         [] Epinephrine    CENTRAL LINES:     [] No   [] Yes   (Date of Insertion:   )           If yes -     [] Right IJ     [] Left IJ [] Right Femoral [] Left Femoral                   [] Right Subclavian [] Left Subclavian       VALENTE'S CATHETER:   [x] No   [] Yes  (Date of Insertion:   )     URINE OUTPUT:            [x] Good   [] Low              [] Anuric      OBJECTIVE:     VITAL SIGNS:  /63   Pulse 88   Temp 98 °F (36.7 °C) (Oral)   Resp 16   Ht 5' 7\" (1.702 m)   Wt 189 lb 13.1 oz (86.1 kg)   SpO2 98%   BMI 29.73 kg/m²   Tmax over 24 hours:  Temp (24hrs), Av.2 °F (36.8 °C), Min:97.9 °F (36.6 °C), Max:98.4 °F (36.9 °C)      Patient Vitals for the past 6 hrs:   BP Temp Temp src Pulse Resp SpO2   20 0800 110/63 98 °F (36.7 °C) Oral 88 16 98 %   20 0700 105/67 -- -- 95 (!) 32 99 %   20 0600 (!) 100/57 -- -- 93 15 98 %   20 0500 116/62 -- -- 92 17 99 %   03/13/20 0400 115/78 97.9 °F (36.6 °C) Oral 96 (!) 46 99 %   03/13/20 0300 112/62 -- -- 87 14 100 %         Intake/Output Summary (Last 24 hours) at 3/13/2020 0834  Last data filed at 3/13/2020 0825  Gross per 24 hour   Intake 4770 ml   Output 1850 ml   Net 2920 ml     Wt Readings from Last 2 Encounters:   03/13/20 189 lb 13.1 oz (86.1 kg)   03/12/20 200 lb (90.7 kg)     Body mass index is 29.73 kg/m². PHYSICAL EXAMINATION:    General appearance - alert, well appearing, and in no distress and oriented to person, place, and time. Mental status - alert, oriented to person, place, and time, normal mood, behavior, speech, dress, motor activity, and thought processes  Eyes - pupils equal and reactive, extraocular eye movements intact, sclera anicteric  Ears - external ear normal  Nose - normal and patent, no erythema, discharge or polyps  Mouth - mucous membranes moist, pharynx normal without lesions  Neck - supple, no significant adenopathy  Chest - clear to auscultation, no wheezes, rales or rhonchi, symmetric air entry  Heart - normal rate, regular rhythm, normal S1, S2, no murmurs, rubs, clicks or gallops  Abdomen - soft, nontender, nondistended, no masses or organomegaly  Neurological - alert, oriented, normal speech, no focal findings or movement disorder noted  Extremities - peripheral pulses normal, no clubbing or cyanosis. No edema.   Skin - normal coloration and turgor, no rashes, no suspicious skin lesions noted      Any additional physical findings:    MEDICATIONS:    Scheduled Meds:   sodium chloride flush  10 mL Intravenous BID    heparin flush  3 mL Intravenous Q12H    sodium chloride  15 mL/kg Intravenous Once    enoxaparin  40 mg Subcutaneous Daily     Continuous Infusions:   insulin 1.14 Units/hr (03/13/20 0800)    IV infusion builder 125 mL/hr at 03/13/20 0502     PRN Meds:   heparin flush, 3 mL, PRN  dextrose, 12.5 g, PRN  sodium phosphate IVPB, 10 mmol, PRN    Or  sodium phosphate IVPB, 15 mmol, PRN    Or  sodium phosphate IVPB, 20 mmol, PRN  potassium chloride, 20 mEq, PRN  magnesium sulfate, 1 g, PRN  medicated lip balm, , PRN          VENT SETTINGS (Comprehensive) (if applicable): Additional Respiratory  Assessments  Pulse: 88  Resp: 16  SpO2: 98 %  Oral Care: Mouth swabbed    ABGs:   n/a      Laboratory findings:    Complete Blood Count:   Recent Labs     03/12/20 0825 03/13/20  0405   WBC 13.1* 3.8*   HGB 18.8* 13.4   HCT 52.0 36.6*    69*        Last 3 Blood Glucose:   Recent Labs     03/12/20 2000 03/13/20  0000 03/13/20  0405   GLUCOSE 209* 191* 140*        PT/INR:    Lab Results   Component Value Date    PROTIME 13.0 03/03/2016    INR 1.2 03/03/2016     PTT:    Lab Results   Component Value Date    APTT 28.2 02/20/2016       Comprehensive Metabolic Profile:   Recent Labs     03/12/20 0825 03/12/20 2000 03/13/20  0000 03/13/20  0405   *   < > 133 135 136   K 4.2   < > 3.5 3.7 3.4*   CL 92*   < > 97* 102 104   CO2 5*   < > 6* 12* 18*   BUN 11   < > 6 6 6   CREATININE 1.0   < > 0.9 0.8 0.7   GLUCOSE 366*   < > 209* 191* 140*   CALCIUM 9.8   < > 7.7* 7.8* 7.7*   PROT 8.5*  --   --   --   --    LABALBU 5.3*  --   --   --   --    BILITOT 0.7  --   --   --   --    ALKPHOS 110  --   --   --   --    AST 14  --   --   --   --    ALT 19  --   --   --   --     < > = values in this interval not displayed. Magnesium:   Lab Results   Component Value Date    MG 1.8 03/13/2020     Phosphorus:   Lab Results   Component Value Date    PHOS 1.9 03/13/2020     Ionized Calcium:   Lab Results   Component Value Date    CAION 1.20 02/08/2016        Urinalysis:     Troponin: No results for input(s): TROPONINI in the last 72 hours.     Microbiology:    Cultures during this admission:     Blood cultures:                 [] None drawn      [] Negative             []  Positive (Details:  )  Urine Culture:                   [] None drawn      [] Negative             []  Positive (Details:  )  Sputum Culture:               [] None drawn       [] Negative             []  Positive (Details:  )   Endotracheal aspirate:     [] None drawn       [] Negative             []  Positive (Details:  )     Other pertinent Labs:   Respiratory panel negative    Radiology/Imaging:   Xr Chest Portable    Result Date: 3/12/2020  Patient MRN: [id number] : 1990 Age:  30 years Gender: Male Order Date: 3/12/2020 7:00 PM Exam: XR CHEST PORTABLE Number of Images: 1 view Indication:   increased work of breathing increased work of breathing Comparison: 2020. FINDINGS: The heart and cardiomediastinal contours are within normal limits. No evidence of airspace disease. No effusion or pneumothorax. No acute airspace disease. Xr Chest Portable    Result Date: 3/12/2020  Patient MRN:  [id number] : 1990 Age: 30 years Gender: Male Order Date:  3/12/2020 8:00 AM EXAM: XR CHEST PORTABLE NUMBER OF IMAGES:  1 INDICATION:  sob sob COMPARISON: 2016 FINDINGS: The heart is normal in size. There is a normal appearance to the pulmonary vasculature. No focal airspace opacity or evidence of pleural effusion. No pneumothorax. No free air beneath the diaphragm. No airspace opacities or pleural effusion. Chest Xray (3/13/2020):    ASSESSMENT:     Patient Active Problem List    Diagnosis Date Noted    DKA, type 2, not at goal [facility]) 2020    Essential hypertension 2016    Splenomegaly 2016    Generalized anxiety disorder 2016    Subclinical hypothyroidism 2016    History of tobacco abuse 2016    Alcohol-induced acute pancreatitis 2016    Depression 2015    Bipolar 2 disorder ([facility] [address] 2015    Asthma 2015    Allergic rhinitis 2015    Alcohol abuse        SYSTEMS ASSESSMENT     Neuro   Awake and alert. Mildly anxious.   Denies drinking alcohol recently to me though has had DTs in the past so will continue to monitor.     Respiratory   Tachypnea improved. Chest x-ray showed no pneumonia, no pneumothorax, no signs of pneumomediastinum. No respiratory distress.       Cardiovascular   Tachycardia improved. Sinus rhythm. No chest pain. No cardiac risk factors. Poor venous access insert TLC     Gastrointestinal   Continues to have nausea. Treat with antiemetic as needed. Advance diet as tolerated.     Renal   Normal renal function. hypokalemia, replete potassium. Infectious Disease   No fever. Influenza testing negative. Afebrile for us here. rapid strep negative. No recent sick contacts. No travel history. No known exposure to the novel coronavirus. Respiratory panel negative      Hematology/Oncology   Leukocytosis improved. Patient is not anemic. We will continue to trend.     Endocrine   DKA. Proceed with insulin and bicarb per DKA protocol. Anion gap improving. Continue to trend.     Social/Spiritual/DNR/Other   Full code. History of bipolar and anxiety. PLAN:     WEAN PER PROTOCOL:  [] No   [x] Yes  [] N/A    DISCONTINUE ANY LABS:   [x] No   [] Yes    ICU PROPHYLAXIS:  Stress ulcer:  [] PPI Agent  [x] S5Enhtn [] Sucralfate  [] Other:  VTE:   [] Enoxaparin  [] Unfract. Heparin Subcut  [] EPC Cuffs    NUTRITION:  [x] NPO [] Tube Feeding (Specify: ) [] TPN  [] PO (Diet: Diet NPO Effective Now)    HOME MEDICATIONS RECONCILED: [] No  [x] Yes    INSULIN DRIP:   [] No   [x] Yes    CONSULTATION NEEDED:  [] No   [x] Yes    FAMILY UPDATED:    [] No   [x] Yes    TRANSFER OUT OF ICU:   [x] No   [] Yes    ADDITIONAL PLAN:    1. Rob Uribe DO, PGY1.                       3/13/2020, 8:34 AM     I personally saw, examined and provided care for the patient. Radiographs, labs and medication list were reviewed by me independently. I spoke with bedside nursing, therapists and consultants.  Critical care services and times documented are independent of procedures and multidisciplinary rounds with

## 2020-03-13 NOTE — PROGRESS NOTES
[facility] Progress Note    Admitting Date and Time: 3/12/2020  5:11 PM  Admit Dx: DKA, type 2, not at goal [facility]) [E11.10]    Subjective:  Patient is being followed for DKA, type 2, not at goal [facility]) [E11.10]   Pt feels ok, no abd pain, no N/V, no headache    ROS: denies fever, chills, cp, sob, n/v, HA unless stated above.       sodium chloride flush  10 mL Intravenous BID    heparin flush  3 mL Intravenous Q12H    sodium chloride  15 mL/kg Intravenous Once    enoxaparin  40 mg Subcutaneous Daily     heparin flush, 3 mL, PRN  dextrose, 12.5 g, PRN  sodium phosphate IVPB, 10 mmol, PRN    Or  sodium phosphate IVPB, 15 mmol, PRN    Or  sodium phosphate IVPB, 20 mmol, PRN  potassium chloride, 20 mEq, PRN  magnesium sulfate, 1 g, PRN  medicated lip balm, , PRN         Objective:    /67   Pulse 95   Temp 97.9 °F (36.6 °C) (Oral)   Resp (!) 32   Ht 5' 7\" (1.702 m)   Wt 189 lb 13.1 oz (86.1 kg)   SpO2 99%   BMI 29.73 kg/m²     General Appearance: alert and oriented to person, place and time and in no acute distress  Skin: warm and dry  Head: normocephalic and atraumatic  Eyes: pupils equal, round, and reactive to light, extraocular eye movements intact, conjunctivae normal  Neck: neck supple and non tender without mass   Pulmonary/Chest: clear to auscultation bilaterally- no wheezes, rales or rhonchi, normal air movement, no respiratory distress  Cardiovascular: normal rate, normal S1 and S2 and no carotid bruits  Abdomen: soft, non-tender, non-distended, normal bowel sounds, no masses or organomegaly  Extremities: no cyanosis, no clubbing and no edema  Neurologic: no cranial nerve deficit and speech normal        Recent Labs     03/12/20 2000 03/13/20  0000 03/13/20  0405    135 136   K 3.5 3.7 3.4*   CL 97* 102 104   CO2 6* 12* 18*   BUN 6 6 6   CREATININE 0.9 0.8 0.7   GLUCOSE 209* 191* 140*   CALCIUM 7.7* 7.8* 7.7*       Recent Labs     03/12/20  0825 03/13/20  0405   WBC 13.1* 3.8*   RBC 5.97* 4.26   HGB 18.8* 13.4   HCT 52.0 36.6*   MCV 87.1 85.9   MCH 31.5 31.5   MCHC 36.2* 36.6*   RDW 13.2 13.5    69*   MPV 10.2 9.7         Assessment:    Active Problems:    DKA, type 2, not at goal [facility])  Resolved Problems:    * No resolved hospital problems. *      Plan:  1. DKA probably type II, question due to chronic pancreatitis given the patient had pancreatitis twice. Presented with a glucose of 366, bicarb of 5 with elevated beta hydroxybutyrate more than 4.5, pH of 7.08 with anion gap at 34. Might have a component of starvation ketoacidosis as well. Start the patient on insulin drip and IV fluids, and bicarb drip, anion gap closed, BG is controlled, repeat BMP, if AG is closed start lantus at 16 units with medium dose SSI  2. Possible diabetes type 2,  A1c at 11, treatment with insulin drip for now, start on lantus 16 units with medium dose SSI  3. History of asthma, not in exacerbation, continue monitor. 4.  History of alcohol abuse, patient is has been sober for the last 2 months. 5.  History of marijuana  use, last use was about a week ago. 6.  Tachycardia-sinus due to dehydration, resolved with IV fluids  7. Leukocytosis, most likely reactive continue monitoring rule out infectious etiology, rapid flu was negative, resolved      NOTE: This report was transcribed using voice recognition software. Every effort was made to ensure accuracy; however, inadvertent computerized transcription errors may be present.   Electronically signed by Em Gamboa MD on 3/13/2020 at 8:05 AM

## 2020-03-13 NOTE — PATIENT CARE CONFERENCE
.  Intensive Care Daily Quality Rounding Checklist      ICU Team Members: bedside nurse, charge nurse, pharmacist, Char Parker, Dr. Omega Connell)    ICU Day #: NUMBER: 2    Intubation Date:  n/a    Ventilator Day #: n/a    Central Line Insertion Date: March 12        Day #: NUMBER: 2     Arterial Line Insertion Date:  n/a      Day #: n/a    DVT Prophylaxis: lovenox    GI Prophylaxis: none    Ramos Catheter Insertion Date:  n/a       Day #: n/a      Continued need (if yes, reason documented and discussed with physician): n/a    Skin Issues/ Wounds and ordered treatment discussed on rounds: no issues    Goals/ Plans for the Day: diabetic education,  Labs, replace electrolytes, transition off insulin drip.  Discontinue bicarb drip

## 2020-03-13 NOTE — PROGRESS NOTES
Nutrition Education    Type and Reason for Visit: Consult, Patient Education    Nutrition Assessment:  Reviewed the Carbohydrate Controlled diet guidelines w/ patient and answered all questions at this time. Provided a written handout w/ information discussed including portion sizes, label reading, meal planning w/ sample menu. Contact number provided and diabetes education classes were encouraged. · Verbally reviewed information with Patient  · Written educational materials provided. · Contact name and number provided.     Electronically signed by Jose Eduardo Spivey MS, RD, LD on 3/13/20 at 1:00 PM EDT    Contact Number: [phone]

## 2020-03-13 NOTE — PROGRESS NOTES
work; patient has no insurance or financial hardship      [x] Script for glucometer and supplies (per preference of Juanpablo Gates)               [x] Script for outpatient diabetes education classes from PCP    [x] Carbohydrate-controlled diet        Thank you for this consult.

## 2020-03-13 NOTE — PLAN OF CARE
Problem: Serum Glucose Level - Abnormal:  Goal: Ability to maintain appropriate glucose levels will improve  Description: Ability to maintain appropriate glucose levels will improve  Outcome: Met This Shift     Problem: Fluid Volume:  Goal: Ability to achieve a balanced intake and output will improve  Description: Ability to achieve a balanced intake and output will improve  Outcome: Met This Shift

## 2020-03-14 VITALS
HEART RATE: 91 BPM | DIASTOLIC BLOOD PRESSURE: 75 MMHG | SYSTOLIC BLOOD PRESSURE: 130 MMHG | OXYGEN SATURATION: 98 % | RESPIRATION RATE: 18 BRPM | BODY MASS INDEX: 29.79 KG/M2 | WEIGHT: 189.82 LBS | HEIGHT: 67 IN | TEMPERATURE: 98.2 F

## 2020-03-14 LAB
METER GLUCOSE: 177 MG/DL (ref 74–99)
MRSA CULTURE ONLY: NORMAL
URINE CULTURE, ROUTINE: NORMAL

## 2020-03-14 PROCEDURE — 6370000000 HC RX 637 (ALT 250 FOR IP): Performed by: INTERNAL MEDICINE

## 2020-03-14 PROCEDURE — 99239 HOSP IP/OBS DSCHRG MGMT >30: CPT | Performed by: INTERNAL MEDICINE

## 2020-03-14 PROCEDURE — 82962 GLUCOSE BLOOD TEST: CPT

## 2020-03-14 PROCEDURE — 2580000003 HC RX 258: Performed by: INTERNAL MEDICINE

## 2020-03-14 RX ORDER — GLUCOSAMINE HCL/CHONDROITIN SU 500-400 MG
CAPSULE ORAL
Qty: 100 STRIP | Refills: 2 | Status: CANCELLED | OUTPATIENT
Start: 2020-03-14

## 2020-03-14 RX ORDER — INSULIN GLARGINE 100 [IU]/ML
18 INJECTION, SOLUTION SUBCUTANEOUS NIGHTLY
Qty: 5 PEN | Refills: 3 | Status: CANCELLED | OUTPATIENT
Start: 2020-03-14

## 2020-03-14 RX ORDER — INSULIN LISPRO 100 [IU]/ML
0-6 INJECTION, SOLUTION INTRAVENOUS; SUBCUTANEOUS
Qty: 5 PEN | Refills: 3 | Status: CANCELLED | OUTPATIENT
Start: 2020-03-14

## 2020-03-14 RX ORDER — INSULIN GLARGINE 100 [IU]/ML
17 INJECTION, SOLUTION SUBCUTANEOUS NIGHTLY
Qty: 5 PEN | Refills: 3 | Status: SHIPPED | OUTPATIENT
Start: 2020-03-14 | End: 2020-03-25 | Stop reason: SDUPTHER

## 2020-03-14 RX ORDER — GLUCOSAMINE HCL/CHONDROITIN SU 500-400 MG
CAPSULE ORAL
Qty: 100 STRIP | Refills: 3 | Status: ON HOLD | OUTPATIENT
Start: 2020-03-14 | End: 2021-04-12 | Stop reason: HOSPADM

## 2020-03-14 RX ORDER — INSULIN LISPRO 100 [IU]/ML
0-6 INJECTION, SOLUTION INTRAVENOUS; SUBCUTANEOUS
Qty: 5 PEN | Refills: 2 | Status: SHIPPED | OUTPATIENT
Start: 2020-03-14 | End: 2020-10-27

## 2020-03-14 RX ADMIN — SODIUM CHLORIDE, PRESERVATIVE FREE 10 ML: 5 INJECTION INTRAVENOUS at 08:33

## 2020-03-14 RX ADMIN — INSULIN LISPRO 2 UNITS: 100 INJECTION, SOLUTION INTRAVENOUS; SUBCUTANEOUS at 08:29

## 2020-03-14 RX ADMIN — SODIUM BICARBONATE 650 MG: 650 TABLET ORAL at 08:32

## 2020-03-14 ASSESSMENT — PAIN SCALES - GENERAL: PAINLEVEL_OUTOF10: 0

## 2020-03-25 ENCOUNTER — HOSPITAL ENCOUNTER (OUTPATIENT)
Age: 30
Discharge: HOME | End: 2020-03-27
Payer: COMMERCIAL

## 2020-03-25 ENCOUNTER — OFFICE VISIT (OUTPATIENT)
Dept: FAMILY MEDICINE CLINIC | Age: 30
End: 2020-03-25
Payer: COMMERCIAL

## 2020-03-25 VITALS
TEMPERATURE: 97.5 F | BODY MASS INDEX: 32.18 KG/M2 | HEIGHT: 67 IN | WEIGHT: 205 LBS | OXYGEN SATURATION: 98 % | SYSTOLIC BLOOD PRESSURE: 110 MMHG | DIASTOLIC BLOOD PRESSURE: 80 MMHG | HEART RATE: 91 BPM

## 2020-03-25 PROBLEM — E11.10 DKA, TYPE 2, NOT AT GOAL (HCC): Status: RESOLVED | Noted: 2020-03-12 | Resolved: 2020-03-25

## 2020-03-25 PROBLEM — Z81.1 FAMILY HISTORY OF ALCOHOL ABUSE: Status: ACTIVE | Noted: 2020-03-25

## 2020-03-25 LAB
ALBUMIN SERPL-MCNC: 4.4 G/DL (ref 3.5–5.2)
ALP BLD-CCNC: 76 U/L (ref 40–129)
ALT SERPL-CCNC: 30 U/L (ref 0–40)
ANION GAP SERPL CALCULATED.3IONS-SCNC: 16 MMOL/L (ref 7–16)
AST SERPL-CCNC: 19 U/L (ref 0–39)
BILIRUB SERPL-MCNC: 0.4 MG/DL (ref 0–1.2)
BUN BLDV-MCNC: 19 MG/DL (ref 6–20)
CALCIUM SERPL-MCNC: 9.1 MG/DL (ref 8.6–10.2)
CHLORIDE BLD-SCNC: 103 MMOL/L (ref 98–107)
CHOLESTEROL, TOTAL: 174 MG/DL (ref 0–199)
CO2: 19 MMOL/L (ref 22–29)
CREAT SERPL-MCNC: 0.7 MG/DL (ref 0.7–1.2)
CREATININE URINE POCT: 200
GFR AFRICAN AMERICAN: >60
GFR NON-AFRICAN AMERICAN: >60 ML/MIN/1.73
GLUCOSE BLD-MCNC: 289 MG/DL (ref 74–99)
HDLC SERPL-MCNC: 28 MG/DL
LDL CHOLESTEROL CALCULATED: 92 MG/DL (ref 0–99)
MICROALBUMIN/CREAT 24H UR: 10 MG/G{CREAT}
MICROALBUMIN/CREAT UR-RTO: <30
POTASSIUM SERPL-SCNC: 4.7 MMOL/L (ref 3.5–5)
SODIUM BLD-SCNC: 138 MMOL/L (ref 132–146)
TOTAL PROTEIN: 6.7 G/DL (ref 6.4–8.3)
TRIGL SERPL-MCNC: 271 MG/DL (ref 0–149)
TSH SERPL DL<=0.05 MIU/L-ACNC: 2.19 UIU/ML (ref 0.27–4.2)
VITAMIN D 25-HYDROXY: 10 NG/ML (ref 30–100)
VLDLC SERPL CALC-MCNC: 54 MG/DL

## 2020-03-25 PROCEDURE — 82306 VITAMIN D 25 HYDROXY: CPT

## 2020-03-25 PROCEDURE — 90732 PPSV23 VACC 2 YRS+ SUBQ/IM: CPT | Performed by: FAMILY MEDICINE

## 2020-03-25 PROCEDURE — 86703 HIV-1/HIV-2 1 RESULT ANTBDY: CPT

## 2020-03-25 PROCEDURE — 90715 TDAP VACCINE 7 YRS/> IM: CPT | Performed by: FAMILY MEDICINE

## 2020-03-25 PROCEDURE — 90746 HEPB VACCINE 3 DOSE ADULT IM: CPT | Performed by: FAMILY MEDICINE

## 2020-03-25 PROCEDURE — 80053 COMPREHEN METABOLIC PANEL: CPT

## 2020-03-25 PROCEDURE — 84443 ASSAY THYROID STIM HORMONE: CPT

## 2020-03-25 PROCEDURE — 99204 OFFICE O/P NEW MOD 45 MIN: CPT | Performed by: FAMILY MEDICINE

## 2020-03-25 PROCEDURE — 90472 IMMUNIZATION ADMIN EACH ADD: CPT | Performed by: FAMILY MEDICINE

## 2020-03-25 PROCEDURE — 82044 UR ALBUMIN SEMIQUANTITATIVE: CPT | Performed by: FAMILY MEDICINE

## 2020-03-25 PROCEDURE — 80061 LIPID PANEL: CPT

## 2020-03-25 PROCEDURE — 90471 IMMUNIZATION ADMIN: CPT | Performed by: FAMILY MEDICINE

## 2020-03-25 PROCEDURE — 90686 IIV4 VACC NO PRSV 0.5 ML IM: CPT | Performed by: FAMILY MEDICINE

## 2020-03-25 RX ORDER — ALBUTEROL SULFATE 90 UG/1
2 AEROSOL, METERED RESPIRATORY (INHALATION) EVERY 4 HOURS PRN
Qty: 1 INHALER | Refills: 3 | Status: SHIPPED
Start: 2020-03-25 | End: 2020-06-16 | Stop reason: SDUPTHER

## 2020-03-25 RX ORDER — BUDESONIDE AND FORMOTEROL FUMARATE DIHYDRATE 80; 4.5 UG/1; UG/1
2 AEROSOL RESPIRATORY (INHALATION) 2 TIMES DAILY
Qty: 1 INHALER | Refills: 3 | Status: SHIPPED
Start: 2020-03-25 | End: 2020-06-16

## 2020-03-25 RX ORDER — INSULIN GLARGINE 100 [IU]/ML
10 INJECTION, SOLUTION SUBCUTANEOUS NIGHTLY
Qty: 5 PEN | Refills: 3
Start: 2020-03-25 | End: 2020-06-16 | Stop reason: SDUPTHER

## 2020-03-25 NOTE — PROGRESS NOTES
speech, no focal findings or movement disorder noted  Psychiatric - alert, oriented to person, place, and time, normal mood, behavior, speech, dress, motor activity, and thought processes, affect appropriate to mood    Labs:  Pertinent labs, imaging, other diagnostic data and other clinician documentation reviewed in electronic medical record. Assessment / Plan   Diagnosis Orders   1. Encounter to establish care     2. Hospital discharge follow-up     3. Other specified diabetes mellitus with other specified complication, unspecified whether long term insulin use ([facility] [address]   · Start metformin; titrate up to full dose; decrease lantus to 10U nightly; 2 week f/u for BS results; will check for GENEVIEVE at future visit so pt does not need to go to lab due to COVID-19  Lipid Panel    CBC Auto Differential    Comprehensive Metabolic Panel    TSH without Reflex    POCT microalbumin    metFORMIN (GLUCOPHAGE) 1000 MG tablet    insulin glargine (LANTUS SOLOSTAR) 100 UNIT/ML injection pen   4. Mild persistent asthma, unspecified whether complicated  albuterol sulfate HFA (VENTOLIN HFA) 108 (90 Base) MCG/ACT inhaler    budesonide-formoterol (SYMBICORT) 80-4.5 MCG/ACT AERO   5. Hypovitaminosis D  Vitamin D 25 Hydroxy   6. Family history of alcohol abuse     7. Need for vaccination  Pneumococcal polysaccharide vaccine 23-valent greater than or equal to 1yo subcutaneous/IM    Tdap (age 6y and older) IM (BOOSTRIX)    Hep B Vaccine Adult (ENGERIX-B)    INFLUENZA, QUADV, 3 YRS AND OLDER, IM PF, PREFILL SYR OR SDV, 0.5ML (AFLURIA QUADV, PF)   8. Healthcare maintenance  HIV-1 AND HIV-2 ANTIBODIES     RTO: Return in about 2 weeks (around 4/8/2020) for DMII; asthma; lab review.       An electronic signature was used to authenticate this note.  ---- Fahad Cardoso MD on 3/25/2020 at 10:06 AM

## 2020-03-26 LAB — HIV-1 AND HIV-2 ANTIBODIES: NORMAL

## 2020-03-27 ENCOUNTER — TELEPHONE (OUTPATIENT)
Dept: FAMILY MEDICINE CLINIC | Age: 30
End: 2020-03-27

## 2020-04-07 ENCOUNTER — OFFICE VISIT (OUTPATIENT)
Dept: FAMILY MEDICINE CLINIC | Age: 30
End: 2020-04-07
Payer: COMMERCIAL

## 2020-04-07 VITALS
OXYGEN SATURATION: 99 % | HEART RATE: 110 BPM | BODY MASS INDEX: 31.61 KG/M2 | SYSTOLIC BLOOD PRESSURE: 110 MMHG | TEMPERATURE: 97.6 F | DIASTOLIC BLOOD PRESSURE: 70 MMHG | WEIGHT: 201.8 LBS

## 2020-04-07 PROBLEM — E11.9 DIABETES MELLITUS (HCC): Status: ACTIVE | Noted: 2020-03-12

## 2020-04-07 PROCEDURE — 99213 OFFICE O/P EST LOW 20 MIN: CPT | Performed by: FAMILY MEDICINE

## 2020-04-07 RX ORDER — ERGOCALCIFEROL 1.25 MG/1
50000 CAPSULE ORAL WEEKLY
Qty: 12 CAPSULE | Refills: 1 | Status: SHIPPED
Start: 2020-04-07 | End: 2020-06-16 | Stop reason: SDUPTHER

## 2020-04-07 NOTE — PROGRESS NOTES
regular rhythm, normal S1, S2, no murmurs, rubs, clicks or gallops; Extremities - peripheral pulses normal, no pedal edema, no clubbing or cyanosis  Abdomen - soft, nontender, nondistended, no masses or organomegaly  Musculoskeletal - no clubbing, cyanosis of extremities noted; no joint deformity or swelling noted; full active range of motion noted without pain  Skin - normal coloration and turgor, no rashes, no suspicious skin lesions noted  Neurological - no obvious CN deficits; normal speech, no focal findings or movement disorder noted  Psychiatric - alert, oriented; normal mood, behavior, speech, dress; affect appropriate to mood    Data:  Pertinent labs, imaging, other diagnostic data and other clinician documentation reviewed in electronic medical record. A / P:   Diagnosis Orders   1. Other specified diabetes mellitus without complication, with long-term current use of insulin (HCC)   · Continue lantus 10U nightly; titrate metformin to full dose; f/u 2 months for a1C check or sooner if BS continues to stay above 200 after full dose metformin; labs for GENEVIEVE after covid resolves    2. History of alcohol abuse  · stable, continue present management     3. Mild persistent asthma without complication    stable, continue present management    4. Hypovitaminosis D  vitamin D (ERGOCALCIFEROL) 1.25 MG (38587 UT) CAPS capsule     RTO: Return in about 2 months (around 6/7/2020) for DMII; A1C check.       An electronic signature was used to authenticate this note.  ---- Debra oLng MD on 4/7/2020 at 11:25 AM

## 2020-06-16 ENCOUNTER — OFFICE VISIT (OUTPATIENT)
Dept: FAMILY MEDICINE CLINIC | Age: 30
End: 2020-06-16
Payer: COMMERCIAL

## 2020-06-16 VITALS
DIASTOLIC BLOOD PRESSURE: 80 MMHG | SYSTOLIC BLOOD PRESSURE: 118 MMHG | HEART RATE: 80 BPM | TEMPERATURE: 97.9 F | HEIGHT: 67 IN | RESPIRATION RATE: 20 BRPM | BODY MASS INDEX: 31.23 KG/M2 | OXYGEN SATURATION: 98 % | WEIGHT: 199 LBS

## 2020-06-16 LAB — HBA1C MFR BLD: 5.9 %

## 2020-06-16 PROCEDURE — 90471 IMMUNIZATION ADMIN: CPT | Performed by: FAMILY MEDICINE

## 2020-06-16 PROCEDURE — 90746 HEPB VACCINE 3 DOSE ADULT IM: CPT | Performed by: FAMILY MEDICINE

## 2020-06-16 PROCEDURE — 99214 OFFICE O/P EST MOD 30 MIN: CPT | Performed by: FAMILY MEDICINE

## 2020-06-16 PROCEDURE — 83036 HEMOGLOBIN GLYCOSYLATED A1C: CPT | Performed by: FAMILY MEDICINE

## 2020-06-16 RX ORDER — BUDESONIDE AND FORMOTEROL FUMARATE DIHYDRATE 80; 4.5 UG/1; UG/1
2 AEROSOL RESPIRATORY (INHALATION) 2 TIMES DAILY
Qty: 1 INHALER | Refills: 3 | Status: SHIPPED
Start: 2020-06-16 | End: 2020-10-27 | Stop reason: SDUPTHER

## 2020-06-16 RX ORDER — ALBUTEROL SULFATE 90 UG/1
2 AEROSOL, METERED RESPIRATORY (INHALATION) EVERY 4 HOURS PRN
Qty: 1 INHALER | Refills: 3 | Status: SHIPPED
Start: 2020-06-16 | End: 2020-10-27 | Stop reason: SDUPTHER

## 2020-06-16 RX ORDER — ERGOCALCIFEROL 1.25 MG/1
50000 CAPSULE ORAL WEEKLY
Qty: 12 CAPSULE | Refills: 1 | Status: SHIPPED
Start: 2020-06-16 | End: 2020-10-27 | Stop reason: SDUPTHER

## 2020-06-16 RX ORDER — INSULIN GLARGINE 100 [IU]/ML
10 INJECTION, SOLUTION SUBCUTANEOUS NIGHTLY
Qty: 5 PEN | Refills: 3 | Status: SHIPPED
Start: 2020-06-16 | End: 2020-10-27

## 2020-06-16 NOTE — PROGRESS NOTES
100 UNIT/ML SOPN Inject 0-6 Units into the skin 3 times daily (before meals) Glucose: Dose:  If <139             No Insulin  140-199           1 Unit  200-249           2 Units  250-299           3 Units  300-349           4 Units  350-400           5 Units  Above 400       6 Units  Patient not taking: Reported on 6/16/2020 3/14/20   Arnel Ross MD       Social hx:   Luis Eduardo Aguirre  reports that he has been smoking cigarettes. He has a 2.75 pack-year smoking history. He has quit using smokeless tobacco. He reports previous alcohol use of about 40.0 standard drinks of alcohol per week. He reports that he does not use drugs. I reviewed the patient's past past medical history, past surgical history, family history, social history, medications and allergies during this visit    Vitals:   /80   Pulse 80   Temp 97.9 °F (36.6 °C) (Temporal)   Resp 20   Ht 5' 7\" (1.702 m)   Wt 199 lb (90.3 kg)   SpO2 98%   BMI 31.17 kg/m²   Wt Readings from Last 3 Encounters:   06/16/20 199 lb (90.3 kg)   04/07/20 201 lb 12.8 oz (91.5 kg)   03/25/20 205 lb (93 kg)       PE:  Constitutional - alert, well appearing, and in no distress  Eyes - pupils equal and reactive, extraocular eye movements intact, left eye normal, right eye normal, no conjunctivitis noted  Neck - symmetric, no obvious masses noted  Respiratory- clear to auscultation, no wheezes, rales or rhonchi, symmetric air entry; no increased work of breathing  Cardiovascular - normal rate, regular rhythm, normal S1, S2, no murmurs, rubs, clicks or gallops;  Extremities - peripheral pulses normal, no pedal edema, no clubbing or cyanosis  Abdomen - soft, nontender, nondistended, no masses or organomegaly  Musculoskeletal - no clubbing, cyanosis of extremities noted; no joint deformity or swelling noted; full active range of motion noted without pain  Skin - normal coloration and turgor, no rashes, no suspicious skin lesions noted  Neurological - no obvious CN

## 2020-10-27 ENCOUNTER — OFFICE VISIT (OUTPATIENT)
Dept: FAMILY MEDICINE CLINIC | Age: 30
End: 2020-10-27
Payer: COMMERCIAL

## 2020-10-27 ENCOUNTER — TELEPHONE (OUTPATIENT)
Dept: INTERNAL MEDICINE | Age: 30
End: 2020-10-27

## 2020-10-27 VITALS
RESPIRATION RATE: 16 BRPM | BODY MASS INDEX: 31.08 KG/M2 | TEMPERATURE: 97.1 F | DIASTOLIC BLOOD PRESSURE: 84 MMHG | WEIGHT: 198 LBS | SYSTOLIC BLOOD PRESSURE: 120 MMHG | OXYGEN SATURATION: 98 % | HEART RATE: 91 BPM | HEIGHT: 67 IN

## 2020-10-27 LAB — HBA1C MFR BLD: 5.3 %

## 2020-10-27 PROCEDURE — 83036 HEMOGLOBIN GLYCOSYLATED A1C: CPT | Performed by: FAMILY MEDICINE

## 2020-10-27 PROCEDURE — 99214 OFFICE O/P EST MOD 30 MIN: CPT | Performed by: FAMILY MEDICINE

## 2020-10-27 RX ORDER — ALBUTEROL SULFATE 90 UG/1
2 AEROSOL, METERED RESPIRATORY (INHALATION) EVERY 4 HOURS PRN
Qty: 1 INHALER | Refills: 3 | Status: SHIPPED
Start: 2020-10-27 | End: 2021-03-09 | Stop reason: SDUPTHER

## 2020-10-27 RX ORDER — ERGOCALCIFEROL 1.25 MG/1
50000 CAPSULE ORAL WEEKLY
Qty: 12 CAPSULE | Refills: 1 | Status: SHIPPED | OUTPATIENT
Start: 2020-10-27

## 2020-10-27 RX ORDER — BUDESONIDE AND FORMOTEROL FUMARATE DIHYDRATE 80; 4.5 UG/1; UG/1
2 AEROSOL RESPIRATORY (INHALATION) 2 TIMES DAILY
Qty: 1 INHALER | Refills: 3 | Status: SHIPPED | OUTPATIENT
Start: 2020-10-27

## 2020-10-27 NOTE — PROGRESS NOTES
CC: Rocio Ayala is a 35 y.o. yo male is here for evaluation evaluation for the following acute & chronic medical concerns: Diabetes (3mo f/up) and Alcohol Problem (Wants councelor )      HPI:    DM,   Hx of DKA admission   hx of chronic pancreatitis, alcoholic pancreatitis. Interval hx: titrated metformin fill dose, he forgets to take this, more like 1000mg daily  Stopped the lantus 10U  Not checking morning BS  Back to poor diet; drinking again close to 1L at night  A1C5.3%    Tobacco dependence:  Overall stopped but will break down about 1x per month and buy a pack     Asthma, uncontrolled -   Usually from dust in working situation  Albuterol use about 3-4x per week  Not using maintenance inhaler, forgets     Hx of mood disorder; Not sure if there is a component of bipolar but on chart. +hx of anxiety and depression. Feels uncontrolled at this time, worsening his drinking  No SI/HI. Not seeing psych and not on medical therapy  Tried trileptal and possibly clonazepam in the past (bad side effects)      ROS negative unless otherwise noted      Social hx:   Rocio Ayala  reports that he has been smoking cigarettes. He has a 2.75 pack-year smoking history. He has quit using smokeless tobacco. He reports previous alcohol use of about 40.0 standard drinks of alcohol per week. He reports that he does not use drugs.     I reviewed the patient's past past medical history, past surgical history, family history, social history, medications and allergies during this visit    Vitals:   /84 (Site: Left Upper Arm, Position: Sitting, Cuff Size: Medium Adult)   Pulse 91   Temp 97.1 °F (36.2 °C) (Temporal)   Resp 16   Ht 5' 7\" (1.702 m)   Wt 198 lb (89.8 kg)   SpO2 98%   BMI 31.01 kg/m²   Wt Readings from Last 3 Encounters:   10/27/20 198 lb (89.8 kg)   06/16/20 199 lb (90.3 kg)   04/07/20 201 lb 12.8 oz (91.5 kg)       PE:  Constitutional - alert, well appearing, and in no distress  Eyes - pupils equal and reactive, extraocular eye movements intact, left eye normal, right eye normal, no conjunctivitis noted  Neck - symmetric, no obvious masses noted  Respiratory-  +diffuse  wheezes, no rales or rhonchi, symmetric air entry; no increased work of breathing  Cardiovascular - normal rate, regular rhythm, normal S1, S2, no murmurs, rubs, clicks or gallops  Extremities -  edema noted  Abdomen - soft, nontender, nondistended  Skin - normal coloration and turgor, no rashes, no suspicious skin lesions noted  Neurological - no obvious CN deficits or focal neurological deficits  Psychiatric - alert, oriented; normal mood, behavior, speech, dress    Data:  Pertinent labs, imaging, other diagnostic data and other clinician documentation reviewed in electronic medical record. A / P:   Diagnosis Orders   1. Type 2 diabetes mellitus without complication, unspecified whether long term insulin use (HCC)  POCT glycosylated hemoglobin (Hb A1C)    CBC Auto Differential    Comprehensive Metabolic Panel    Lipid Panel    Hemoglobin A1C    TSH without Reflex   2. Essential hypertension     3. Depression, unspecified depression type     4. Moderate persistent asthma without complication     5. Alcohol abuse  [facility], [address] [place] [address], [facility]   6. History of tobacco abuse     7. Need for vaccination     8. Hypovitaminosis D  vitamin D (ERGOCALCIFEROL) 1.25 MG (79787 UT) CAPS capsule    Vitamin D 25 Hydroxy       Refer to  for alcohol cessation help and referral to psych as well (help with finding a place that would take his insurance)  Labs as ordered  Continue metformin 1000mg daily  Remain off of Lantus  F/u with psych for mood disorder, I am reluctant about starting a new medication considering possibility of bipolar and alcohol use at this time      RTO: Return in about 3 months (around 1/27/2021) for DMII, HTN; depression.       An electronic signature was used to authenticate

## 2020-10-27 NOTE — TELEPHONE ENCOUNTER
Patient referred to SW by PCP due to dual diagnosis and wanting linkage to tx. SW called patient and introduced self and role. Patient reports long-term hx of depression and alcohol use disorder. Reports hx of inpatient AOD tx with last time being about 4-5 years ago but unable to recall where. Reports since that time he had periods of sobriety with most recently being about four months until recent relapse. Patient reports currently drinking 1L of liquor per day. Patient also reports depression sx exacerbate following relapse on alcohol. He reports in the past being tx at [address]. Patient states he has not been in [facility] tx for about seven years. Patient reports a desire to stop drinking as well as obtain linkage to  tx services. Discussed different AoD and MH tx LOC. Due to current level of use patient would require inpatient detox and this would be recommended as first step. Discussed option of inpatient detox with plan to transition to dual dx IOP vs. traditional outpatient [facility]. Patient provided with detox facility contact information to initiate assessment. Spoke with [facility] who report insurance is in network and patient if appropriate would be put on 3-5 day wait list. Also provided numbers for [facility], and [facility]. Offered linkage to [facility] to assist in the interim as well and patient in agreement. Called referral to Lilian Herr with [facility] who agreed to call patient to initiate services. SW provided patient with SW contact information with further questions concerns. Will f/u with patient in a few days to determine status and will further link to resources as appropriate.

## 2020-10-30 ENCOUNTER — TELEPHONE (OUTPATIENT)
Dept: FAMILY MEDICINE CLINIC | Age: 30
End: 2020-10-30

## 2020-11-02 ENCOUNTER — TELEPHONE (OUTPATIENT)
Dept: FAMILY MEDICINE CLINIC | Age: 30
End: 2020-11-02

## 2020-11-10 ENCOUNTER — TELEPHONE (OUTPATIENT)
Dept: INTERNAL MEDICINE | Age: 30
End: 2020-11-10

## 2021-03-03 ENCOUNTER — OFFICE VISIT (OUTPATIENT)
Dept: FAMILY MEDICINE CLINIC | Age: 31
End: 2021-03-03
Payer: COMMERCIAL

## 2021-03-03 ENCOUNTER — HOSPITAL ENCOUNTER (EMERGENCY)
Age: 31
Discharge: HOME | End: 2021-03-03
Attending: EMERGENCY MEDICINE
Payer: COMMERCIAL

## 2021-03-03 VITALS
RESPIRATION RATE: 18 BRPM | SYSTOLIC BLOOD PRESSURE: 170 MMHG | BODY MASS INDEX: 33.9 KG/M2 | DIASTOLIC BLOOD PRESSURE: 100 MMHG | TEMPERATURE: 97.3 F | HEIGHT: 67 IN | OXYGEN SATURATION: 99 % | WEIGHT: 216 LBS | HEART RATE: 127 BPM

## 2021-03-03 VITALS
RESPIRATION RATE: 16 BRPM | OXYGEN SATURATION: 96 % | HEIGHT: 67 IN | BODY MASS INDEX: 33.27 KG/M2 | HEART RATE: 98 BPM | WEIGHT: 212 LBS | SYSTOLIC BLOOD PRESSURE: 158 MMHG | TEMPERATURE: 97.3 F | DIASTOLIC BLOOD PRESSURE: 87 MMHG

## 2021-03-03 DIAGNOSIS — F10.930 ALCOHOL WITHDRAWAL SYNDROME WITHOUT COMPLICATION (HCC): Primary | ICD-10-CM

## 2021-03-03 DIAGNOSIS — R00.0 TACHYCARDIA: Primary | ICD-10-CM

## 2021-03-03 DIAGNOSIS — F32.A DEPRESSION, UNSPECIFIED DEPRESSION TYPE: ICD-10-CM

## 2021-03-03 DIAGNOSIS — R73.9 HYPERGLYCEMIA: ICD-10-CM

## 2021-03-03 LAB
ACETAMINOPHEN LEVEL: <5 MCG/ML (ref 10–30)
ALBUMIN SERPL-MCNC: 5 G/DL (ref 3.5–5.2)
ALP BLD-CCNC: 65 U/L (ref 40–129)
ALT SERPL-CCNC: 38 U/L (ref 0–40)
AMPHETAMINE SCREEN, URINE: NOT DETECTED
ANION GAP SERPL CALCULATED.3IONS-SCNC: 12 MMOL/L (ref 7–16)
AST SERPL-CCNC: 25 U/L (ref 0–39)
BACTERIA: NORMAL /HPF
BARBITURATE SCREEN URINE: NOT DETECTED
BASOPHILS ABSOLUTE: 0.04 E9/L (ref 0–0.2)
BASOPHILS RELATIVE PERCENT: 0.6 % (ref 0–2)
BENZODIAZEPINE SCREEN, URINE: NOT DETECTED
BETA-HYDROXYBUTYRATE: 0.27 MMOL/L (ref 0.02–0.27)
BILIRUB SERPL-MCNC: 0.5 MG/DL (ref 0–1.2)
BILIRUBIN URINE: ABNORMAL
BLOOD, URINE: NEGATIVE
BUN BLDV-MCNC: 16 MG/DL (ref 6–20)
CALCIUM SERPL-MCNC: 9.7 MG/DL (ref 8.6–10.2)
CANNABINOID SCREEN URINE: POSITIVE
CHLORIDE BLD-SCNC: 97 MMOL/L (ref 98–107)
CHP ED QC CHECK: NORMAL
CHP ED QC CHECK: NORMAL
CLARITY: CLEAR
CO2: 28 MMOL/L (ref 22–29)
COCAINE METABOLITE SCREEN URINE: NOT DETECTED
COLOR: YELLOW
CREAT SERPL-MCNC: 1 MG/DL (ref 0.7–1.2)
EKG ATRIAL RATE: 113 BPM
EKG P AXIS: 34 DEGREES
EKG P-R INTERVAL: 130 MS
EKG Q-T INTERVAL: 330 MS
EKG QRS DURATION: 82 MS
EKG QTC CALCULATION (BAZETT): 452 MS
EKG R AXIS: 53 DEGREES
EKG T AXIS: 37 DEGREES
EKG VENTRICULAR RATE: 113 BPM
EOSINOPHILS ABSOLUTE: 0.18 E9/L (ref 0.05–0.5)
EOSINOPHILS RELATIVE PERCENT: 2.8 % (ref 0–6)
ETHANOL: <10 MG/DL (ref 0–0.08)
FENTANYL SCREEN, URINE: NOT DETECTED
GFR AFRICAN AMERICAN: >60
GFR NON-AFRICAN AMERICAN: >60 ML/MIN/1.73
GLUCOSE BLD-MCNC: 188 MG/DL
GLUCOSE BLD-MCNC: 189 MG/DL (ref 74–99)
GLUCOSE BLD-MCNC: 197 MG/DL
GLUCOSE URINE: 250 MG/DL
HCT VFR BLD CALC: 45.1 % (ref 37–54)
HEMOGLOBIN: 16.2 G/DL (ref 12.5–16.5)
IMMATURE GRANULOCYTES #: 0.02 E9/L
IMMATURE GRANULOCYTES %: 0.3 % (ref 0–5)
INR BLD: 1
KETONES, URINE: ABNORMAL MG/DL
LACTIC ACID: 2.8 MMOL/L (ref 0.5–2.2)
LEUKOCYTE ESTERASE, URINE: NEGATIVE
LIPASE: 19 U/L (ref 13–60)
LYMPHOCYTES ABSOLUTE: 1.97 E9/L (ref 1.5–4)
LYMPHOCYTES RELATIVE PERCENT: 30.5 % (ref 20–42)
Lab: ABNORMAL
MCH RBC QN AUTO: 32 PG (ref 26–35)
MCHC RBC AUTO-ENTMCNC: 35.9 % (ref 32–34.5)
MCV RBC AUTO: 89 FL (ref 80–99.9)
METER GLUCOSE: 188 MG/DL (ref 74–99)
METHADONE SCREEN, URINE: NOT DETECTED
MONOCYTES ABSOLUTE: 0.5 E9/L (ref 0.1–0.95)
MONOCYTES RELATIVE PERCENT: 7.8 % (ref 2–12)
NEUTROPHILS ABSOLUTE: 3.74 E9/L (ref 1.8–7.3)
NEUTROPHILS RELATIVE PERCENT: 58 % (ref 43–80)
NITRITE, URINE: NEGATIVE
OPIATE SCREEN URINE: NOT DETECTED
OXYCODONE URINE: NOT DETECTED
PDW BLD-RTO: 12.4 FL (ref 11.5–15)
PH UA: 6 (ref 5–9)
PH VENOUS: 7.37 (ref 7.35–7.45)
PHENCYCLIDINE SCREEN URINE: NOT DETECTED
PLATELET # BLD: 123 E9/L (ref 130–450)
PMV BLD AUTO: 9.6 FL (ref 7–12)
POTASSIUM REFLEX MAGNESIUM: 4.1 MMOL/L (ref 3.5–5)
PROTEIN UA: 30 MG/DL
PROTHROMBIN TIME: 11.1 SEC (ref 9.3–12.4)
RBC # BLD: 5.07 E12/L (ref 3.8–5.8)
RBC UA: NORMAL /HPF (ref 0–2)
SALICYLATE, SERUM: <0.3 MG/DL (ref 0–30)
SODIUM BLD-SCNC: 137 MMOL/L (ref 132–146)
SPECIFIC GRAVITY UA: >=1.03 (ref 1–1.03)
TOTAL PROTEIN: 7.6 G/DL (ref 6.4–8.3)
TRICYCLIC ANTIDEPRESSANTS SCREEN SERUM: NEGATIVE NG/ML
UROBILINOGEN, URINE: 0.2 E.U./DL
WBC # BLD: 6.5 E9/L (ref 4.5–11.5)
WBC UA: NORMAL /HPF (ref 0–5)

## 2021-03-03 PROCEDURE — 81001 URINALYSIS AUTO W/SCOPE: CPT

## 2021-03-03 PROCEDURE — 82962 GLUCOSE BLOOD TEST: CPT

## 2021-03-03 PROCEDURE — 96361 HYDRATE IV INFUSION ADD-ON: CPT

## 2021-03-03 PROCEDURE — 82077 ASSAY SPEC XCP UR&BREATH IA: CPT

## 2021-03-03 PROCEDURE — 99285 EMERGENCY DEPT VISIT HI MDM: CPT

## 2021-03-03 PROCEDURE — 99214 OFFICE O/P EST MOD 30 MIN: CPT | Performed by: FAMILY MEDICINE

## 2021-03-03 PROCEDURE — 82962 GLUCOSE BLOOD TEST: CPT | Performed by: FAMILY MEDICINE

## 2021-03-03 PROCEDURE — 93005 ELECTROCARDIOGRAM TRACING: CPT | Performed by: NURSE PRACTITIONER

## 2021-03-03 PROCEDURE — 80143 DRUG ASSAY ACETAMINOPHEN: CPT

## 2021-03-03 PROCEDURE — 82800 BLOOD PH: CPT

## 2021-03-03 PROCEDURE — 6360000002 HC RX W HCPCS: Performed by: EMERGENCY MEDICINE

## 2021-03-03 PROCEDURE — 85610 PROTHROMBIN TIME: CPT

## 2021-03-03 PROCEDURE — 83690 ASSAY OF LIPASE: CPT

## 2021-03-03 PROCEDURE — 93000 ELECTROCARDIOGRAM COMPLETE: CPT | Performed by: FAMILY MEDICINE

## 2021-03-03 PROCEDURE — 80307 DRUG TEST PRSMV CHEM ANLYZR: CPT

## 2021-03-03 PROCEDURE — 85025 COMPLETE CBC W/AUTO DIFF WBC: CPT

## 2021-03-03 PROCEDURE — 36415 COLL VENOUS BLD VENIPUNCTURE: CPT

## 2021-03-03 PROCEDURE — 93010 ELECTROCARDIOGRAM REPORT: CPT | Performed by: INTERNAL MEDICINE

## 2021-03-03 PROCEDURE — 80179 DRUG ASSAY SALICYLATE: CPT

## 2021-03-03 PROCEDURE — 96374 THER/PROPH/DIAG INJ IV PUSH: CPT

## 2021-03-03 PROCEDURE — 83605 ASSAY OF LACTIC ACID: CPT

## 2021-03-03 PROCEDURE — 2580000003 HC RX 258: Performed by: EMERGENCY MEDICINE

## 2021-03-03 PROCEDURE — 80053 COMPREHEN METABOLIC PANEL: CPT

## 2021-03-03 PROCEDURE — 82010 KETONE BODYS QUAN: CPT

## 2021-03-03 RX ORDER — LORAZEPAM 2 MG/ML
2 INJECTION INTRAMUSCULAR ONCE
Status: COMPLETED | OUTPATIENT
Start: 2021-03-03 | End: 2021-03-03

## 2021-03-03 RX ORDER — 0.9 % SODIUM CHLORIDE 0.9 %
1000 INTRAVENOUS SOLUTION INTRAVENOUS ONCE
Status: COMPLETED | OUTPATIENT
Start: 2021-03-03 | End: 2021-03-03

## 2021-03-03 RX ADMIN — LORAZEPAM 2 MG: 2 INJECTION INTRAMUSCULAR; INTRAVENOUS at 16:32

## 2021-03-03 RX ADMIN — SODIUM CHLORIDE 1000 ML: 9 INJECTION, SOLUTION INTRAVENOUS at 15:37

## 2021-03-03 NOTE — ED PROVIDER NOTES
HPI:  3/3/21, Time: 3:24 PM BINA Lara is a 35 y.o. male presenting to the ED for tachycardia beginning 4 days ago. Symptoms have been moderate in severity and constant since onset. No exacerbating or relieving factors. He reports associated symptoms of bilateral blurred vision and cramps in all of his extremities. Has a history of type 2 diabetes currently on Metformin. He states he was previously on insulin, but he was able to be taken off the insulin due to lifestyle changes. Patient denies any recent illness, fevers, cough, congestion, rhinorrhea, loss of taste or smell, abdominal pain, nausea, vomiting, diarrhea, dysuria, polyuria, chest pain, or shortness of breath. Patient has a history of daily alcohol use. States he drinks approximately half a gallon of vodka daily. Last drink was yesterday evening. Patient also reports thoughts of wanting to cut himself. He states that he has a history of superficial cuts to his extremities. He is denying thoughts of wanting to kill himself. Currently is not following with a psychiatrist.  He has a history of depression and bipolar disorder. Review of Systems:   Pertinent positives and negatives are stated within HPI, all other systems reviewed and are negative.          --------------------------------------------- PAST HISTORY ---------------------------------------------  Past Medical History:  has a past medical history of Alcohol abuse, Allergic rhinitis, Anxiety, Asthma, Bipolar 2 disorder ([facility] [address] Depression, Generalized anxiety disorder, High anion gap metabolic acidosis, History of tobacco abuse, Plantar fasciitis of left foot, Prolonged INR, and Splenomegaly. Past Surgical History:  has a past surgical history that includes bronchoscopy (02/16/2016). Social History:  reports that he has been smoking cigarettes. He has a 2.75 pack-year smoking history.  He has quit using smokeless tobacco. He reports current alcohol use of about 40.0 standard drinks of alcohol per week. He reports that he does not use drugs. Family History: family history includes Other in his father. The patients home medications have been reviewed. Allergies: Patient has no known allergies.     -------------------------------------------------- RESULTS -------------------------------------------------  All laboratory and radiology results have been personally reviewed by myself   LABS:  Results for orders placed or performed during the hospital encounter of 03/03/21   Beta-Hydroxybutyrate   Result Value Ref Range    Beta-Hydroxybutyrate 0.27 0.02 - 0.27 mmol/L   CBC Auto Differential   Result Value Ref Range    WBC 6.5 4.5 - 11.5 E9/L    RBC 5.07 3.80 - 5.80 E12/L    Hemoglobin 16.2 12.5 - 16.5 g/dL    Hematocrit 45.1 37.0 - 54.0 %    MCV 89.0 80.0 - 99.9 fL    MCH 32.0 26.0 - 35.0 pg    MCHC 35.9 (H) 32.0 - 34.5 %    RDW 12.4 11.5 - 15.0 fL    Platelets 228 (L) 938 - 450 E9/L    MPV 9.6 7.0 - 12.0 fL    Neutrophils % 58.0 43.0 - 80.0 %    Immature Granulocytes % 0.3 0.0 - 5.0 %    Lymphocytes % 30.5 20.0 - 42.0 %    Monocytes % 7.8 2.0 - 12.0 %    Eosinophils % 2.8 0.0 - 6.0 %    Basophils % 0.6 0.0 - 2.0 %    Neutrophils Absolute 3.74 1.80 - 7.30 E9/L    Immature Granulocytes # 0.02 E9/L    Lymphocytes Absolute 1.97 1.50 - 4.00 E9/L    Monocytes Absolute 0.50 0.10 - 0.95 E9/L    Eosinophils Absolute 0.18 0.05 - 0.50 E9/L    Basophils Absolute 0.04 0.00 - 0.20 E9/L   Comprehensive Metabolic Panel w/ Reflex to MG   Result Value Ref Range    Sodium 137 132 - 146 mmol/L    Potassium reflex Magnesium 4.1 3.5 - 5.0 mmol/L    Chloride 97 (L) 98 - 107 mmol/L    CO2 28 22 - 29 mmol/L    Anion Gap 12 7 - 16 mmol/L    Glucose 189 (H) 74 - 99 mg/dL    BUN 16 6 - 20 mg/dL    CREATININE 1.0 0.7 - 1.2 mg/dL    GFR Non-African American >60 >=60 mL/min/1.73    GFR African American >60     Calcium 9.7 8.6 - 10.2 mg/dL    Total Protein 7.6 6.4 - 8.3 g/dL    Albumin 5.0 3.5 - 5.2 g/dL    Total Bilirubin 0.5 0.0 - 1.2 mg/dL    Alkaline Phosphatase 65 40 - 129 U/L    ALT 38 0 - 40 U/L    AST 25 0 - 39 U/L   Lipase   Result Value Ref Range    Lipase 19 13 - 60 U/L   Lactic Acid, Plasma   Result Value Ref Range    Lactic Acid 2.8 (H) 0.5 - 2.2 mmol/L   Protime-INR   Result Value Ref Range    Protime 11.1 9.3 - 12.4 sec    INR 1.0    Urinalysis, reflex to microscopic   Result Value Ref Range    Color, UA Yellow Straw/Yellow    Clarity, UA Clear Clear    Glucose, Ur 250 (A) Negative mg/dL    Bilirubin Urine SMALL (A) Negative    Ketones, Urine TRACE (A) Negative mg/dL    Specific Gravity, UA >=1.030 1.005 - 1.030    Blood, Urine Negative Negative    pH, UA 6.0 5.0 - 9.0    Protein, UA 30 (A) Negative mg/dL    Urobilinogen, Urine 0.2 <2.0 E.U./dL    Nitrite, Urine Negative Negative    Leukocyte Esterase, Urine Negative Negative   Urine Drug Screen   Result Value Ref Range    Amphetamine Screen, Urine NOT DETECTED Negative <1000 ng/mL    Barbiturate Screen, Ur NOT DETECTED Negative < 200 ng/mL    Benzodiazepine Screen, Urine NOT DETECTED Negative < 200 ng/mL    Cannabinoid Scrn, Ur POSITIVE (A) Negative < 50ng/mL    Cocaine Metabolite Screen, Urine NOT DETECTED Negative < 300 ng/mL    Opiate Scrn, Ur NOT DETECTED Negative < 300ng/mL    PCP Screen, Urine NOT DETECTED Negative < 25 ng/mL    Methadone Screen, Urine NOT DETECTED Negative <300 ng/mL    Oxycodone Urine NOT DETECTED Negative <100 ng/mL    FENTANYL SCREEN, URINE NOT DETECTED Negative <1 ng/mL    Drug Screen Comment: see below    Serum Drug Screen   Result Value Ref Range    Ethanol Lvl <10 mg/dL    Acetaminophen Level <5.0 (L) 10.0 - 43.7 mcg/mL    Salicylate, Serum <0.6 0.0 - 30.0 mg/dL    TCA Scrn NEGATIVE Cutoff:300 ng/mL   pH, venous   Result Value Ref Range    pH, Yuri 7.37 7.35 - 7.45   Microscopic Urinalysis   Result Value Ref Range    WBC, UA NONE 0 - 5 /HPF    RBC, UA NONE 0 - 2 /HPF    Bacteria, UA NONE SEEN None Seen /HPF POCT Glucose   Result Value Ref Range    Glucose 188 mg/dL    QC OK? ok    POCT Glucose   Result Value Ref Range    Meter Glucose 188 (H) 74 - 99 mg/dL   EKG 12 Lead   Result Value Ref Range    Ventricular Rate 113 BPM    Atrial Rate 113 BPM    P-R Interval 130 ms    QRS Duration 82 ms    Q-T Interval 330 ms    QTc Calculation (Bazett) 452 ms    P Axis 34 degrees    R Axis 53 degrees    T Axis 37 degrees       RADIOLOGY:  Interpreted by Radiologist.  No orders to display       ------------------------- NURSING NOTES AND VITALS REVIEWED ---------------------------   The nursing notes within the ED encounter and vital signs as below have been reviewed. BP (!) 158/87   Pulse 98   Temp 97.3 °F (36.3 °C) (Temporal)   Resp 16   Ht 5' 7\" (1.702 m)   Wt 212 lb (96.2 kg)   SpO2 96%   BMI 33.20 kg/m²   Oxygen Saturation Interpretation: Normal      ---------------------------------------------------PHYSICAL EXAM--------------------------------------      Constitutional/General: Alert and oriented x3, appears uncomfortable, non toxic in NAD  Head: Normocephalic and atraumatic  Eyes: PERRL, EOMI  Mouth: Oropharynx clear, handling secretions, no trismus  Neck: Supple, full ROM,   Pulmonary: Lungs clear to auscultation bilaterally, no wheezes, rales, or rhonchi. Not in respiratory distress  Cardiovascular:  Regular rhythm, tachycardic, no murmurs, gallops, or rubs. 2+ distal pulses  Abdomen: Soft, non tender, non distended,   Extremities: Moves all extremities x 4. Warm and well perfused. No leg edema. No calf tenderness. Compartments to all extremities are soft and easily compressible. Skin: warm and dry without rash  Neurologic: GCS 15, no focal motor or sensory deficits   Psych: Normal Affect. No SI.  No HI.      ------------------------------ ED COURSE/MEDICAL DECISION MAKING----------------------  Medications   0.9 % sodium chloride bolus (0 mLs Intravenous Stopped 3/3/21 2372)   LORazepam (ATIVAN) injection 2 mg (2 mg Intravenous Given 3/3/21 7327)       Medical Decision Making/ED COURSE:   Patient is a 58-year-old male presenting with tachycardia. In the ED, patient was tachycardic but otherwise hemodynamically stable and afebrile. He was saturating well on room air. On exam, patient appeared uncomfortable but was nontoxic. He was not actively suicidal or homicidal.  Patient was placed on the cardiac monitor. I interpreted findings. Rhythm - sinus tachycardia. Labs obtained. Patient administered IVF. I reviewed and interpreted labs. Labs were significant for hyperglycemia of 189 but no anion gap to suggest DKA. He was observed in the ED for several hours. Patient was evaluated by social work here as well as behavioral social work downtown. He does not meet inpatient psychiatric criteria. He was offered alcohol detox multiple times, but the patient declined. States that he cannot afford admission or detox at this time. He was provided with multiple psychiatric and detox resources. There was concern that the patient was withdrawing from alcohol in the ED, so he was given a dose of Ativan with improvement. He is refusing admission, and he will sign out AMA. Encouraged to return to the ED at any time if he would like further treatment. Patient remained hemodynamically stable throughout ED course. ED Course as of Mar 04 0258   Wed Mar 03, 2021   1543 EKG: This EKG is signed and interpreted by me. Rate: 113  Rhythm: Sinus  Interpretation: Sinus tachycardia, normal OK interval, normal QRS, normal QT interval, no acute ST or T wave changes  Comparison: no previous EKG available        [JA]   1623 On reevaluation, tachycardia has improved somewhat, but he is still having heart rates in the 120s. Concern that the patient is withdrawing from alcohol. Given 2 of Ativan. He continues to deny active suicidal or homicidal thoughts. There is no evidence of DKA.   I offered admission for alcohol detox, the patient states he cannot afford it. Discussed with social work who will evaluate the patient. [JA]   [facility]. Patient is not actively suicidal at this time. Social work will provide outpatient psych and detox materials for the patient. [JA]   2119 Patient mildly tachycardic on reevaluation. Discussed with the patient that I am concerned that he is in alcohol withdrawal.  He is continuing to refuse admission for alcohol withdrawal.  He understands risks. He will sign out AMA. He continues to have no active suicidal or homicidal ideation. Patient will be provided with psych and detox resources. [JA]   2120 Patient would like to leave against medical advice. Patient is awake, alert, and answering questions appropriately. Patient has medical decision making capacity. Patient does not appear clinically intoxicated. Patient is not suicidal or homicidal. Understands risks of leaving against medical advice, including alcohol withdrawal, seizures, serious disability, and death. Encouraged to follow with their doctor as soon as possible and return to the emergency department if they would like further treatment. [JA]      ED Course User Index  [JA] Goldie Ayala MD       Counseling: The emergency provider has spoken with the patient and discussed todays results, in addition to providing specific details for the plan of care and counseling regarding the diagnosis and prognosis. Questions are answered at this time and they are agreeable with the plan.      --------------------------------- IMPRESSION AND DISPOSITION ---------------------------------    IMPRESSION  1. Alcohol withdrawal syndrome without complication ([facility] [address]    2. Depression, unspecified depression type    3. Hyperglycemia        DISPOSITION  Disposition: AMA  Patient condition is fair      NOTE: This report was transcribed using voice recognition software.  Every effort was made to ensure accuracy; however, inadvertent computerized transcription errors may be present    I, Parisa Mei MD, am the primary provider of this record       Parisa Mei MD  03/04/21 4756

## 2021-03-03 NOTE — ED NOTES
Pt states that he feels like \"cutting\" himself due to the feeling of wanting to drink. Pt states that he drinks a half gallon of vodka a day and has a history of cutting himself.  Sitter at [address], RN  03/03/21 340 [facility], RN  03/03/21 6720

## 2021-03-03 NOTE — ED NOTES
FIRST PROVIDER CONTACT ASSESSMENT NOTE      Department of Emergency Medicine   3/3/21  2:45 PM EST    Chief Complaint: No chief complaint on file. History of Present Illness:   Amanda Haines is a 35 y.o. male who presents to the ED for to rule out DKA by his PCP. He also drinks alcohol daily in the evenings. He states he drinks about half a gallon of vodka every evening. He does state that in the past when he has had episodes of wanting to self harm himself with no plan. He does not follow any definitive counselors at this time. Medical History:  has a past medical history of Alcohol abuse, Allergic rhinitis, Anxiety, Asthma, Bipolar 2 disorder ([address] Depression, Generalized anxiety disorder, High anion gap metabolic acidosis, History of tobacco abuse, Plantar fasciitis of left foot, Prolonged INR, and Splenomegaly. Surgical History:  has a past surgical history that includes bronchoscopy (02/16/2016). Social History:  reports that he has been smoking cigarettes. He has a 2.75 pack-year smoking history. He has quit using smokeless tobacco. He reports previous alcohol use of about 40.0 standard drinks of alcohol per week. He reports that he does not use drugs. Family History: family history includes Other in his father. *ALLERGIES*     Patient has no known allergies. Physical Exam:      VS:  There were no vitals taken for this visit.      Initial Plan of Care:  Initiate Treatment-patient will be taken to a room for further evaluation by ER attending.    -----------------END OF FIRST PROVIDER CONTACT ASSESSMENT NOTE--------------  Electronically signed by CHUCK Arita CNP   DD: 3/3/21             CHUCK Arita CNP  03/03/21 1446

## 2021-03-03 NOTE — CARE COORDINATION
Social Work/Transition of Care:    Pt medically cleared and ED PCP requesting Behavioral Health Evaluation, SW met with pt and consent for Telehealth Services signed. SW called [facility] spoke with Chrissy Zimmerman to request evaluation.     Electronically signed by Nadiya Schaefer on 1/5/8834 at 5:06 PM

## 2021-03-03 NOTE — PROGRESS NOTES
CC: Tonya Landers is a 35 y.o. yo male is here for evaluation evaluation for the following acute medical concerns: Palpitations (fast and can feel it in his head, onset 4 days), Leg Pain (joshua horse in thigh), Blurred Vision (both eyes), and Other (DTE)      HPI:    Acute illness:  He has been drinking more; feels heart beating out of chest and pounding in ears, body aches 4 days; tremors and cramps in the legs; nighly chest pain; blurry vision    ROS negative unless otherwise noted    Vitals:   BP (!) 170/100   Pulse 127   Temp 97.3 °F (36.3 °C)   Resp 18   Ht 5' 7\" (1.702 m)   Wt 216 lb (98 kg)   SpO2 99%   BMI 33.83 kg/m²   Wt Readings from Last 3 Encounters:   03/03/21 216 lb (98 kg)   10/27/20 198 lb (89.8 kg)   06/16/20 199 lb (90.3 kg)       PE:  Constitutional - alert, well appearing, and in no distress  Eyes - extraocular eye movements intact, left eye normal, right eye normal, +conjunctivitis noted  Neck - symmetric, no obvious masses noted  Respiratory- clear to auscultation, no wheezes, rales or rhonchi, symmetric air entry; no increased work of breathing  Cardiovascular - tachycardia, normal S1, S2, no murmurs, rubs, clicks or gallops  Extremities - no edema noted  Abdomen - soft, nontender, nondistended  Skin - normal coloration and turgor, no rashes, no suspicious skin lesions noted  Neurological - no obvious CN deficits or focal neurological deficits  Psychiatric - alert, oriented; normal mood, behavior, speech, dress    A / P:     Diagnosis Orders   1.  Tachycardia  EKG 12 lead    EKG 12 lead    POCT Glucose     Dehydrated, tachycardic, consider DKA with alcohol use and diabetes  Proceed to ED  Pt declined EMS; he will drive himself    RTO: ED f/u    An electronic signature was used to authenticate this note.  ---- Zara Flaherty MD on 3/3/2021 at 2:18 PM

## 2021-03-03 NOTE — CARE COORDINATION
This note will not be viewable in [facility] for the following reason(s). Suspected substance abuse disorder. [facility] Support Note    Name: Manav Swenson  Date: 3/3/2021    Chief Complaint   Patient presents with    Suicidal     denies plan, currently self harm, pt states alcohol helps    Diabetic Ketoacidosis     sent by [facility] to r/o DKA, hx DKA    Tachycardia    Alcohol Problem     approx 1/2 gallon of vodka daily,l last drink 2200 yesterday        Peer Support met with patient. [x] Support and education provided  [x] Resources provided   [] Treatment referral:   [] Other:   [] Patient declined peer recovery services     Referred By: Anu Mcintyre Notes: Declined detox at this time.     Tad Mazariegos, 3/3/2021

## 2021-03-03 NOTE — ED NOTES
Bed:  [place] [address]  Expected date:   Expected time:   Means of arrival:   Comments:  RM Nicoleside Lydia Bernheim, [address]  03/03/21 6801

## 2021-03-04 ENCOUNTER — CARE COORDINATION (OUTPATIENT)
Dept: CARE COORDINATION | Age: 31
End: 2021-03-04

## 2021-03-04 NOTE — ED NOTES
Pt wishes to leave AMA. Physician explained the risks of leaving AMA to the pt. The pt is AOx4 and is able to make his own decisions. Pt still wished to leave AMA. RN and physician gave the pt places to help with his alcohol addiction.      Sincere Calderon RN  03/03/21 8619

## 2021-03-04 NOTE — ED NOTES
Emergency Department [facility] AN AFFILIATE OF [facility] Biopsychosocial Assessment Note    Assessment completed via telehealth    Chief Complaint:     Patient presents to the ED to rule out DKA by his PCP. Pt drinks alcohol daily and states in the past he has had episodes of wanting to self harm. MSE:    Pt presents as a 35year old male. He is alert and oriented x4. Normal speech pattern. Pt denies SI, HI and AVH at this time. Admits to alcohol abuse. Pt calm and cooperative with assessment process. Clinical Summary/History:     Pt reports he is drinking \"excessively\" and the withdrawals are \"getting worse. \" He states he drinks 1/2 gallon of vodka daily, more on the weekends. Pt states he only drinks after work, never first thing upon waking. Pt declines detox services. He relays that he has a lot of things he would need to get in place in his personal life before he could enter a detox program. Pt reports that the alcohol does increase his anger as well as making him \"numb. \" Pt denies SI however reports that he does at times self-harm as a coping mechanism. Last instance of self harm was approximately one month ago. Pt adamantly states that he was not attempting to end his life or injure himself, he just \"wanted to feel. \" Pt also denies HI. He states he does not believe he experiences AVH, but can't be sure as he lives alone. Pt reports a lengthy mental health history, going back to around 11years old. He states he does not believe that the diagnoses he has received historically are accurate, and is interested in engaging in mental health services presently. SW consulted with ED Physician. There is no pink slip in place, pt does not meet criteria for inpatient hospitalization. Pt will be provided resources to follow-up with treatment. Gender  [x] Male [] Female [] Transgender  [] Other    Sexual Orientation    [x] Heterosexual [] Homosexual [] Bisexual [] Other    Suicidal Behavioral: CSSR-S Complete.   [] Reports:    [] Past [] Present   [x] Denies    Homicidal/ Violent Behavior  [] Reports:   [] Past [] Present   [x] Denies     Hallucinations/Delusions   [] Reports:   [x] Denies     Substance Use/Alcohol Use/Addiction: SBIRT Screen Complete. [x] Reports:   [] Denies     Trauma History  [x] Reports:  [] Denies     Collateral Information:   Patient was seen by Peer Support and declined Detox services.     Level of Care/Disposition Plan  [x] Home: with resources for AoD Detox/treatment and Outpatient Mental Health    [] Outpatient Provider:   [] Crisis Unit:   [] Inpatient Psychiatric Unit:  [] Other:        EDVIN Mo, MARVW  03/03/21 7414

## 2021-03-04 NOTE — CARE COORDINATION
Called patient to discuss Care Coordination Program. I left a voice mail message introducing myself and a brief description of the Care Coordination Program.   Requested a call back to discuss the program, enrollment, and schedule a time to speak with me. Direct contact information given. Will send introduction letter via [address]

## 2021-03-09 ENCOUNTER — TELEPHONE (OUTPATIENT)
Dept: FAMILY MEDICINE CLINIC | Age: 31
End: 2021-03-09

## 2021-03-09 ENCOUNTER — OFFICE VISIT (OUTPATIENT)
Dept: FAMILY MEDICINE CLINIC | Age: 31
End: 2021-03-09
Payer: COMMERCIAL

## 2021-03-09 VITALS
RESPIRATION RATE: 20 BRPM | SYSTOLIC BLOOD PRESSURE: 146 MMHG | TEMPERATURE: 98.6 F | DIASTOLIC BLOOD PRESSURE: 92 MMHG | HEART RATE: 106 BPM | HEIGHT: 67 IN | WEIGHT: 214.6 LBS | OXYGEN SATURATION: 99 % | BODY MASS INDEX: 33.68 KG/M2

## 2021-03-09 DIAGNOSIS — Z09 HOSPITAL DISCHARGE FOLLOW-UP: Primary | ICD-10-CM

## 2021-03-09 DIAGNOSIS — F41.9 ANXIETY: ICD-10-CM

## 2021-03-09 DIAGNOSIS — Z13.31 POSITIVE DEPRESSION SCREENING: ICD-10-CM

## 2021-03-09 DIAGNOSIS — F10.10 ALCOHOL ABUSE: ICD-10-CM

## 2021-03-09 DIAGNOSIS — F31.81 BIPOLAR 2 DISORDER (HCC): Chronic | ICD-10-CM

## 2021-03-09 DIAGNOSIS — F32.A DEPRESSION, UNSPECIFIED DEPRESSION TYPE: Chronic | ICD-10-CM

## 2021-03-09 PROCEDURE — 99214 OFFICE O/P EST MOD 30 MIN: CPT | Performed by: FAMILY MEDICINE

## 2021-03-09 PROCEDURE — G8431 POS CLIN DEPRES SCRN F/U DOC: HCPCS | Performed by: FAMILY MEDICINE

## 2021-03-09 PROCEDURE — 1111F DSCHRG MED/CURRENT MED MERGE: CPT | Performed by: FAMILY MEDICINE

## 2021-03-09 RX ORDER — HYDROXYZINE HYDROCHLORIDE 25 MG/1
25 TABLET, FILM COATED ORAL EVERY 8 HOURS PRN
Qty: 30 TABLET | Refills: 0 | Status: SHIPPED | OUTPATIENT
Start: 2021-03-09 | End: 2021-03-19

## 2021-03-09 RX ORDER — ALBUTEROL SULFATE 90 UG/1
2 AEROSOL, METERED RESPIRATORY (INHALATION) EVERY 4 HOURS PRN
Qty: 1 INHALER | Refills: 3 | Status: SHIPPED | OUTPATIENT
Start: 2021-03-09

## 2021-03-09 SDOH — ECONOMIC STABILITY: FOOD INSECURITY: WITHIN THE PAST 12 MONTHS, THE FOOD YOU BOUGHT JUST DIDN'T LAST AND YOU DIDN'T HAVE MONEY TO GET MORE.: NEVER TRUE

## 2021-03-09 SDOH — ECONOMIC STABILITY: TRANSPORTATION INSECURITY
IN THE PAST 12 MONTHS, HAS THE LACK OF TRANSPORTATION KEPT YOU FROM MEDICAL APPOINTMENTS OR FROM GETTING MEDICATIONS?: NO

## 2021-03-09 SDOH — ECONOMIC STABILITY: INCOME INSECURITY: HOW HARD IS IT FOR YOU TO PAY FOR THE VERY BASICS LIKE FOOD, HOUSING, MEDICAL CARE, AND HEATING?: SOMEWHAT HARD

## 2021-03-09 ASSESSMENT — PATIENT HEALTH QUESTIONNAIRE - PHQ9
7. TROUBLE CONCENTRATING ON THINGS, SUCH AS READING THE NEWSPAPER OR WATCHING TELEVISION: 3
SUM OF ALL RESPONSES TO PHQ QUESTIONS 1-9: 24
2. FEELING DOWN, DEPRESSED OR HOPELESS: 3
6. FEELING BAD ABOUT YOURSELF - OR THAT YOU ARE A FAILURE OR HAVE LET YOURSELF OR YOUR FAMILY DOWN: 3
SUM OF ALL RESPONSES TO PHQ9 QUESTIONS 1 & 2: 6
SUM OF ALL RESPONSES TO PHQ QUESTIONS 1-9: 22
5. POOR APPETITE OR OVEREATING: 2
4. FEELING TIRED OR HAVING LITTLE ENERGY: 3

## 2021-03-09 NOTE — TELEPHONE ENCOUNTER
SW receieved call back from patient. He reports recent increase in alcohol use and depressive sx. He reports a desire to initiate tx that he was initially hesitant to upon previous discussions. Patient is denying any current SI/plan/intent. Again reviewed AoD LOC options. Discussed benefit of dual IOP due to patient co-occurring mh diagnoses. Patient in agreement. Made referral to [facility]) Dual IOP. Notified patient they will call him to schedule intake. He also reports still having peer recovery contact information and SW encouraged him to utilize this as needed. He agreed to do so. Will f/u with patient to ensure linked with IOP.

## 2021-03-09 NOTE — TELEPHONE ENCOUNTER
SW received referral from PCP to assist with linkage to psych/AoD resources. Patient did not answer but LVM to return call.

## 2021-03-09 NOTE — CARE COORDINATION
The following forms have been acknowledged and consented to by this client:     [x] Verbally    [] In person     [x] Consent for telehealth services   [x] [facility] ([facility]) consent for treatment, payment, and health care operations   [x] THERESA - Receive/disclosure information authorization   Insurance Agency= [address] [place] [address]   Emergency Contact= Noé Ewing, Parent   Referral Source=     [] Mental Health Program: Code of conduct, attendance agreement, group information, and safety plan  [x] Dual Diagnosis Program: Consent for treatment, client rights, confidentiality and grievance procedure, treatment contract, and safety plan   [] MOUD Program: Agreement, consent, and process       3/9/21 Chetna Nogueira consented via phone to telehealth & payment. Emailed Halley Garcias consent forms & Dual Dx packet and will confirm receipt and consent for all above checked forms.   BECKY

## 2021-03-09 NOTE — PROGRESS NOTES
CC: Caitlin Rosario is a 35 y.o. yo male is here for evaluation evaluation for the following acute on chronic medical concerns: Follow-Up from Hospital (going as good as it can be), Anxiety (when he gets to the hospital or office he has increased anxiety, has at home but not to this extent), Alcohol Problem (would like to discuss outpatient rehab, depending on cost, would like help), and Depression      HPI:    ED f/u:  Alcohol dependence and Depression  Cut back since ED; still drinking about under 1L but this is less than prior  PHQ-9 Total Score: 24 (3/9/2021  9:02 AM)  Thoughts that you would be better off dead, or of hurting yourself in some way: 2 (3/9/2021  9:02 AM)  today denies current SI/HI  He states he was on trileptal in the past about over 10 years ago  Hx of bipolar, anxiety, depression  Not seeing psych at this time    ROS negative unless otherwise noted    Vitals:   BP (!) 146/92   Pulse 106   Temp 98.6 °F (37 °C)   Resp 20   Ht 5' 7\" (1.702 m)   Wt 214 lb 9.6 oz (97.3 kg)   SpO2 99%   BMI 33.61 kg/m²   Wt Readings from Last 3 Encounters:   03/09/21 214 lb 9.6 oz (97.3 kg)   03/03/21 212 lb (96.2 kg)   03/03/21 216 lb (98 kg)       PE:  Constitutional - alert, well appearing, and in no distress  Eyes - extraocular eye movements intact, left eye normal, right eye normal, no conjunctivitis noted  Neck - symmetric, no obvious masses noted  Respiratory- clear to auscultation, no wheezes, rales or rhonchi, symmetric air entry; no increased work of breathing  Cardiovascular - normal rate, regular rhythm, normal S1, S2, no murmurs, rubs, clicks or gallops  Extremities - no edema noted  Abdomen - soft, nontender, nondistended  Skin - normal coloration and turgor, no rashes, no suspicious skin lesions noted  Neurological - no obvious CN deficits or focal neurological deficits  Psychiatric - alert, oriented; normal mood, behavior, speech, dress    A / P:     Diagnosis Orders   1.  Hospital discharge follow-up  FL DISCHARGE MEDS RECONCILED W/ CURRENT OUTPATIENT MED LIST   2. Alcohol abuse  [facility], [address], [facility]   3. Bipolar 2 disorder ([address]  [facility], [facility], [address], [facility]   4. Depression, unspecified depression type  [facility], [address], [facility]   5. Anxiety  hydrOXYzine (ATARAX) 25 MG tablet    [facility], [facility], [address], [place]   6. Positive depression screening  Positive Screen for Clinical Depression with a Documented Follow-up Plan [address] [facility] Faust[address] Tavarez [facility], [facility], [place]     Medication refills today  Focus today on getting help with alcohol cessation and getting into psych as well  I am giving short course of atarax to help with anxiety  Considering hx of bipolar, will need additional mood stabilizer as well  Referral to [facility] to help coordinate areas for OP therapy    RTO: Return in about 1 month (around 4/9/2021) for alcohol use disorder; depression.         An electronic signature was used to authenticate this note.  ---- Amanda Gale MD on 3/9/2021 at 9:34 AM

## 2021-03-10 ENCOUNTER — TELEPHONE (OUTPATIENT)
Dept: FAMILY MEDICINE CLINIC | Age: 31
End: 2021-03-10

## 2021-03-17 ENCOUNTER — HOSPITAL ENCOUNTER (OUTPATIENT)
Dept: PSYCHIATRY | Age: 31
Setting detail: THERAPIES SERIES
Discharge: HOME | End: 2021-03-17
Payer: COMMERCIAL

## 2021-03-17 ENCOUNTER — TELEPHONE (OUTPATIENT)
Dept: FAMILY MEDICINE CLINIC | Age: 31
End: 2021-03-17

## 2021-03-17 NOTE — CARE COORDINATION
This note will not be viewable in [employer] for the following reason(s). Suspected substance abuse disorder. Non-billable. LPC contacted pt for his scheduled intake assessment appointment. Upon orienting the pt to the program, pt advised that he needs to find an evening IOP and he prefers to work in a dual diagnosis program. After discussing with peer recovery for treatment options, pt is referred to [facility] as it would meet his needs and preferences. ROJAS followed up with pt who is agreeable and states he will call the provided number: [phone].     Electronically signed by Gloria Montilla LPC on 3/17/2021 at 2:13 PM

## 2021-03-17 NOTE — TELEPHONE ENCOUNTER
JEREL called patient to f/u on IOP referral. He reports unable to attend dual IOP at [facility] d/t work schedule and instead wanting evening option. He reports he is awaiting call back from IOP clinician to further identify referral options. He agreed to call JEREL tomorrow to update.

## 2021-03-19 ENCOUNTER — TELEPHONE (OUTPATIENT)
Dept: FAMILY MEDICINE CLINIC | Age: 31
End: 2021-03-19

## 2021-03-19 NOTE — TELEPHONE ENCOUNTER
SW called patient to again f/u on referral from [facility]. He did not answer. LVM to return call.

## 2021-04-06 ENCOUNTER — HOSPITAL ENCOUNTER (INPATIENT)
Age: 31
LOS: 5 days | Discharge: HOME | DRG: 885 | End: 2021-04-12
Attending: EMERGENCY MEDICINE | Admitting: PSYCHIATRY & NEUROLOGY
Payer: COMMERCIAL

## 2021-04-06 DIAGNOSIS — F32.A DEPRESSION WITH SUICIDAL IDEATION: Primary | ICD-10-CM

## 2021-04-06 DIAGNOSIS — R45.851 DEPRESSION WITH SUICIDAL IDEATION: Primary | ICD-10-CM

## 2021-04-06 LAB
ACETAMINOPHEN LEVEL: <5 MCG/ML (ref 10–30)
ALBUMIN SERPL-MCNC: 5.1 G/DL (ref 3.5–5.2)
ALP BLD-CCNC: 69 U/L (ref 40–129)
ALT SERPL-CCNC: 73 U/L (ref 0–40)
AMPHETAMINE SCREEN, URINE: NOT DETECTED
ANION GAP SERPL CALCULATED.3IONS-SCNC: 24 MMOL/L (ref 7–16)
AST SERPL-CCNC: 65 U/L (ref 0–39)
BACTERIA: ABNORMAL /HPF
BACTERIA: ABNORMAL /HPF
BARBITURATE SCREEN URINE: POSITIVE
BASOPHILS ABSOLUTE: 0.07 E9/L (ref 0–0.2)
BASOPHILS RELATIVE PERCENT: 0.7 % (ref 0–2)
BENZODIAZEPINE SCREEN, URINE: POSITIVE
BILIRUB SERPL-MCNC: 0.8 MG/DL (ref 0–1.2)
BILIRUBIN URINE: ABNORMAL
BILIRUBIN URINE: NEGATIVE
BLOOD, URINE: ABNORMAL
BLOOD, URINE: NEGATIVE
BUN BLDV-MCNC: 11 MG/DL (ref 6–20)
CALCIUM SERPL-MCNC: 9.4 MG/DL (ref 8.6–10.2)
CANNABINOID SCREEN URINE: NOT DETECTED
CHLORIDE BLD-SCNC: 98 MMOL/L (ref 98–107)
CLARITY: CLEAR
CLARITY: CLEAR
CO2: 13 MMOL/L (ref 22–29)
COCAINE METABOLITE SCREEN URINE: NOT DETECTED
COLOR: YELLOW
COLOR: YELLOW
CREAT SERPL-MCNC: 1 MG/DL (ref 0.7–1.2)
EOSINOPHILS ABSOLUTE: 0.31 E9/L (ref 0.05–0.5)
EOSINOPHILS RELATIVE PERCENT: 3.3 % (ref 0–6)
ETHANOL: <10 MG/DL (ref 0–0.08)
FENTANYL SCREEN, URINE: NOT DETECTED
GFR AFRICAN AMERICAN: >60
GFR NON-AFRICAN AMERICAN: >60 ML/MIN/1.73
GLUCOSE BLD-MCNC: 111 MG/DL (ref 74–99)
GLUCOSE URINE: NEGATIVE MG/DL
GLUCOSE URINE: NEGATIVE MG/DL
HCT VFR BLD CALC: 48.6 % (ref 37–54)
HEMOGLOBIN: 17.2 G/DL (ref 12.5–16.5)
HYALINE CASTS: ABNORMAL /LPF (ref 0–2)
IMMATURE GRANULOCYTES #: 0.03 E9/L
IMMATURE GRANULOCYTES %: 0.3 % (ref 0–5)
KETONES, URINE: 40 MG/DL
KETONES, URINE: >=80 MG/DL
LEUKOCYTE ESTERASE, URINE: NEGATIVE
LEUKOCYTE ESTERASE, URINE: NEGATIVE
LYMPHOCYTES ABSOLUTE: 2.77 E9/L (ref 1.5–4)
LYMPHOCYTES RELATIVE PERCENT: 29.5 % (ref 20–42)
Lab: ABNORMAL
MCH RBC QN AUTO: 31.5 PG (ref 26–35)
MCHC RBC AUTO-ENTMCNC: 35.4 % (ref 32–34.5)
MCV RBC AUTO: 89 FL (ref 80–99.9)
METHADONE SCREEN, URINE: NOT DETECTED
MONOCYTES ABSOLUTE: 0.88 E9/L (ref 0.1–0.95)
MONOCYTES RELATIVE PERCENT: 9.4 % (ref 2–12)
NEUTROPHILS ABSOLUTE: 5.34 E9/L (ref 1.8–7.3)
NEUTROPHILS RELATIVE PERCENT: 56.8 % (ref 43–80)
NITRITE, URINE: NEGATIVE
NITRITE, URINE: NEGATIVE
OPIATE SCREEN URINE: NOT DETECTED
OXYCODONE URINE: NOT DETECTED
PDW BLD-RTO: 12 FL (ref 11.5–15)
PH UA: 5.5 (ref 5–9)
PH UA: 5.5 (ref 5–9)
PHENCYCLIDINE SCREEN URINE: NOT DETECTED
PLATELET # BLD: 208 E9/L (ref 130–450)
PMV BLD AUTO: 10.1 FL (ref 7–12)
POTASSIUM REFLEX MAGNESIUM: 4.2 MMOL/L (ref 3.5–5)
PROTEIN UA: NEGATIVE MG/DL
PROTEIN UA: NEGATIVE MG/DL
RBC # BLD: 5.46 E12/L (ref 3.8–5.8)
RBC UA: ABNORMAL /HPF (ref 0–2)
RBC UA: ABNORMAL /HPF (ref 0–2)
SALICYLATE, SERUM: <0.3 MG/DL (ref 0–30)
SARS-COV-2, NAAT: NOT DETECTED
SODIUM BLD-SCNC: 135 MMOL/L (ref 132–146)
SPECIFIC GRAVITY UA: 1.02 (ref 1–1.03)
SPECIFIC GRAVITY UA: 1.02 (ref 1–1.03)
TOTAL PROTEIN: 7.8 G/DL (ref 6.4–8.3)
TRICYCLIC ANTIDEPRESSANTS SCREEN SERUM: NEGATIVE NG/ML
UROBILINOGEN, URINE: 0.2 E.U./DL
UROBILINOGEN, URINE: 0.2 E.U./DL
WBC # BLD: 9.4 E9/L (ref 4.5–11.5)
WBC UA: ABNORMAL /HPF (ref 0–5)
WBC UA: ABNORMAL /HPF (ref 0–5)

## 2021-04-06 PROCEDURE — 2580000003 HC RX 258: Performed by: EMERGENCY MEDICINE

## 2021-04-06 PROCEDURE — 80053 COMPREHEN METABOLIC PANEL: CPT

## 2021-04-06 PROCEDURE — 99284 EMERGENCY DEPT VISIT MOD MDM: CPT

## 2021-04-06 PROCEDURE — 81001 URINALYSIS AUTO W/SCOPE: CPT

## 2021-04-06 PROCEDURE — 96361 HYDRATE IV INFUSION ADD-ON: CPT

## 2021-04-06 PROCEDURE — 96360 HYDRATION IV INFUSION INIT: CPT

## 2021-04-06 PROCEDURE — 87635 SARS-COV-2 COVID-19 AMP PRB: CPT

## 2021-04-06 PROCEDURE — 85025 COMPLETE CBC W/AUTO DIFF WBC: CPT

## 2021-04-06 PROCEDURE — 80143 DRUG ASSAY ACETAMINOPHEN: CPT

## 2021-04-06 PROCEDURE — 80179 DRUG ASSAY SALICYLATE: CPT

## 2021-04-06 PROCEDURE — 82077 ASSAY SPEC XCP UR&BREATH IA: CPT

## 2021-04-06 PROCEDURE — 80307 DRUG TEST PRSMV CHEM ANLYZR: CPT

## 2021-04-06 PROCEDURE — 93005 ELECTROCARDIOGRAM TRACING: CPT | Performed by: STUDENT IN AN ORGANIZED HEALTH CARE EDUCATION/TRAINING PROGRAM

## 2021-04-06 RX ORDER — 0.9 % SODIUM CHLORIDE 0.9 %
2000 INTRAVENOUS SOLUTION INTRAVENOUS ONCE
Status: COMPLETED | OUTPATIENT
Start: 2021-04-06 | End: 2021-04-06

## 2021-04-06 RX ORDER — 0.9 % SODIUM CHLORIDE 0.9 %
2000 INTRAVENOUS SOLUTION INTRAVENOUS ONCE
Status: COMPLETED | OUTPATIENT
Start: 2021-04-06 | End: 2021-04-07

## 2021-04-06 RX ADMIN — SODIUM CHLORIDE 2000 ML: 9 INJECTION, SOLUTION INTRAVENOUS at 20:19

## 2021-04-06 RX ADMIN — SODIUM CHLORIDE 2000 ML: 9 INJECTION, SOLUTION INTRAVENOUS at 23:32

## 2021-04-06 ASSESSMENT — ENCOUNTER SYMPTOMS
COUGH: 0
SORE THROAT: 0
COLOR CHANGE: 0
BACK PAIN: 0
SHORTNESS OF BREATH: 0
WHEEZING: 0
SINUS PRESSURE: 0
VOMITING: 0
EYE REDNESS: 0
DIARRHEA: 0
NAUSEA: 0
ABDOMINAL PAIN: 0
EYE PAIN: 0

## 2021-04-06 NOTE — ED NOTES
Emergency Department [facility] AN AFFILIATE OF [facility] Biopsychosocial Assessment Note    Chief Complaint:  patient from [place] [address] and is having homicidal thoughts with depression. has been having thoughts of cutting self with a razor. MSE:  PT IS TEARFUL, SAD, DEPRESSED, ANXIOUS, THOUGHTS ARE UNSTABLE, ADMITS TO SELF HARMING THOUGHTS, HAS POOR INSIGHT AND JUDGEMENT, IS HOPELESS AND HELPLESS. Clinical Summary/History:   PT HAS BEEN PINK SLIPPED EXPRESSING HOMICIDAL THOUGHTS WITH NO SPECIFIC TARGET, INCREASED DEPRESSION, HOPELESS, HAS THOUGHTS OF CUTTING WITH A RAZOR, INCREASED ANGER, SEEING SHADOWS, NO EATING FOR THE PAST FEW DAYS, IS AGITATED, TEARFUL, RESTLESS, IS PRE*OCCUPIED AND INTERNALLY STIMULATED. I MET WITH PT, WHO REPORTED TO ME THAT HE IS HAVING THOUGHTS OF SELF HARM, BUT NO SI.  HE ADMITS THAT LIVING AT [facility] IS STRESSFUL AND HE IS UNABLE TO COPE WITH CERTAIN PEOPLE AND THEN HAS THOUGHTS OF FIGHTING PEOPLE. PT IDENTIFIED STRESSORS AS HIS CURRENT RESIDENCE AND TREATMENT CENTER, \"AS A WHOLE\", LACK OF SLEEP, FASTED FOR 4 DAYS, AND OVERCOMING BEING AN ALCOHOLIC FOR THE PAST 13 YEARS. PT HAS A HX OF SUICIDE ATTEMPT BY OD ON PILLS AND ALCOHOL, DENIES HALLUCINATIONS. PT REPORTS THAT HE WAS SCHEDULED FOR AN EVALUATION AT [facility] BUT NEVER RECEIVED IT. PT IS EMOTIONALLY UNSTABLE AND NEEDS TO BE EVALUATED FOR SAFETY AND STABILITY. Gender  [x] Male [] Female [] Transgender  [] Other    Sexual Orientation    [x] Heterosexual [] Homosexual [] Bisexual [] Other    Suicidal Behavioral: CSSR-S Complete. [x] Reports:    [] Past [] Present   [] Denies    Homicidal/ Violent Behavior  [x] Reports:   [] Past [] Present   [] Denies     Hallucinations/Delusions   [] Reports:   [x] Denies     Substance Use/Alcohol Use/Addiction: SBIRT Screen Complete.    [x] Reports:   [] Denies     Trauma History  [] Reports:  [x] Denies     Collateral Information:   NO COLLATERAL OBTAINED AT THIS TIME    Level of Care/Disposition Plan  [] Home:   [] Outpatient Provider:   [] Crisis Unit:   [x] Inpatient Psychiatric Unit:  [] Other:        KATHLEEN Malik  04/06/21 0870

## 2021-04-06 NOTE — ED PROVIDER NOTES
[address]      Pt Name: Josiah Abreu  MRN: [id number]  [place] 1990  Date of evaluation: 4/6/2021      CHIEF COMPLAINT       Chief Complaint   Patient presents with    Psychiatric Evaluation     patient from [place] [address] and is having homicidal thoughts with depression. has been having thoughts of cutting self with a razor. HPI  Josiah Abreu is a 35 y.o. male with a past medical history of alcohol abuse currently living in a rehab facility for the last 10 days presents with complaints of suicidal and homicidal ideation. Patient states that he wants to hurt himself and kill himself by himself with a razor. States that he has a previous attempt in the past. States that he also does not like to people in his rehab facility. States that they \"trigger him. \" He has no other complaints. Denies any recent illicit substance abuse, alcohol use, fevers, chills, nausea, vomiting, chest pain, shortness of breath, dumping, flank pain, dysuria, hematuria, diarrhea, constipation, new rashes or sores, formication's, and visual or auditory hallucinations. Except as noted above the remainder of the review of systems was reviewed and negative. Review of Systems   Constitutional: Negative for chills and fever. HENT: Negative for ear pain, sinus pressure and sore throat. Eyes: Negative for pain and redness. Respiratory: Negative for cough, shortness of breath and wheezing. Cardiovascular: Negative for chest pain. Gastrointestinal: Negative for abdominal pain, diarrhea, nausea and vomiting. Endocrine: Negative for polyuria. Genitourinary: Negative for dysuria and frequency. Musculoskeletal: Negative for arthralgias and back pain. Skin: Negative for color change, rash and wound. Neurological: Negative for weakness and headaches. Hematological: Negative for adenopathy.    Psychiatric/Behavioral: Positive for self-injury and suicidal ideas. Negative for agitation. Homicidal ideation. All other systems reviewed and are negative. Physical Exam  Vitals signs and nursing note reviewed. Constitutional:       General: He is not in acute distress. Appearance: Normal appearance. He is well-developed. He is not ill-appearing or diaphoretic. HENT:      Head: Normocephalic and atraumatic. Eyes:      Pupils: Pupils are equal, round, and reactive to light. Neck:      Musculoskeletal: Normal range of motion. Cardiovascular:      Rate and Rhythm: Regular rhythm. Tachycardia present. Pulses: Normal pulses. Heart sounds: Normal heart sounds. No murmur. No friction rub. No gallop. Pulmonary:      Effort: Pulmonary effort is normal. No respiratory distress. Breath sounds: Normal breath sounds. No stridor. No wheezing, rhonchi or rales. Chest:      Chest wall: No tenderness. Abdominal:      General: Abdomen is flat. Bowel sounds are normal.      Palpations: Abdomen is soft. Tenderness: There is no abdominal tenderness. There is no guarding or rebound. Musculoskeletal: Normal range of motion. General: No swelling or tenderness. Right lower leg: No edema. Left lower leg: No edema. Skin:     General: Skin is warm and dry. Capillary Refill: Capillary refill takes less than 2 seconds. Neurological:      General: No focal deficit present. Mental Status: He is alert and oriented to person, place, and time. Cranial Nerves: No cranial nerve deficit. Sensory: No sensory deficit.       Coordination: Coordination normal.   Psychiatric:         Mood and Affect: Mood normal.          Procedures     MDM  Number of Diagnoses or Management Options  Depression with suicidal ideation  Diagnosis management comments: 32-year-old male with a past medical history of alcohol abuse is currently living at a rehab facility for the last 10 days presents with complaints of suicidal and homicidal ideation. He was pink slipped at the facility. He states he wants to come self in the wrist. Previous suicide attempts in the past. States that he is also been triggered by two people as a facility. Vitals within normal limits. On physical exam patient is in no acute distress, speaking full sentences alert and oriented x3. Pupils equal and reactive to light. He has no wounds or injuries on his body. Diagnostic labs and imaging reviewed and interpreted. Patient is positive for benzodiazepines as well as barbiturates. ED Course as of Apr 06 1847   Tue Apr 06, 2021   0981 Patient is medically clear. [JV]      ED Course User Index  [JV] Sloane Martini MD             --------------------------------------------- PAST HISTORY ---------------------------------------------  Past Medical History:  has a past medical history of Alcohol abuse, Allergic rhinitis, Anxiety, Asthma, Bipolar 2 disorder ([address] Depression, Generalized anxiety disorder, High anion gap metabolic acidosis, History of tobacco abuse, Plantar fasciitis of left foot, Prolonged INR, and Splenomegaly. Past Surgical History:  has a past surgical history that includes bronchoscopy (02/16/2016). Social History:  reports that he has been smoking cigarettes. He has a 2.75 pack-year smoking history. He has quit using smokeless tobacco. He reports current alcohol use of about 40.0 standard drinks of alcohol per week. He reports that he does not use drugs. Family History: family history includes Other in his father. The patients home medications have been reviewed. Allergies: Patient has no known allergies.     -------------------------------------------------- RESULTS -------------------------------------------------    LABS:  Results for orders placed or performed during the hospital encounter of 04/06/21   COVID-19, Rapid    Specimen: Nasopharyngeal Swab   Result Value Ref Range    SARS-CoV-2, NAAT Ur NOT DETECTED Negative < 50ng/mL    Cocaine Metabolite Screen, Urine NOT DETECTED Negative < 300 ng/mL    Opiate Scrn, Ur NOT DETECTED Negative < 300ng/mL    PCP Screen, Urine NOT DETECTED Negative < 25 ng/mL    Methadone Screen, Urine NOT DETECTED Negative <300 ng/mL    Oxycodone Urine NOT DETECTED Negative <100 ng/mL    FENTANYL SCREEN, URINE NOT DETECTED Negative <1 ng/mL    Drug Screen Comment: see below    EKG 12 Lead   Result Value Ref Range    Ventricular Rate 90 BPM    Atrial Rate 90 BPM    P-R Interval 138 ms    QRS Duration 84 ms    Q-T Interval 350 ms    QTc Calculation (Bazett) 428 ms    P Axis 33 degrees    R Axis 53 degrees    T Axis 59 degrees       RADIOLOGY:  No orders to display       EKG: This EKG is signed and interpreted by me. Rate: 90. Normal sinus rhythm. Normal axis deviation. No QTC prolongation. No ST elevations or depressions. Stable as compared to previous EKG.    ------------------------- NURSING NOTES AND VITALS REVIEWED ---------------------------  Date / Time Roomed:  4/6/2021  3:51 PM  ED Bed Assignment:  [address] [place] [address]    The nursing notes within the ED encounter and vital signs as below have been reviewed. Patient Vitals for the past 24 hrs:   BP Temp Pulse Resp SpO2   04/06/21 1842 (!) 152/89 98.4 °F (36.9 °C) 98 17 99 %   04/06/21 1553 (!) 161/94 98 °F (36.7 °C) 104 19 97 %       Oxygen Saturation Interpretation: Normal    ------------------------------------------ PROGRESS NOTES ------------------------------------------    Counseling:  I have spoken with the patient and discussed todays results, in addition to providing specific details for the plan of care and counseling regarding the diagnosis and prognosis. Their questions are answered at this time and they are agreeable with the plan of admission.    --------------------------------- ADDITIONAL PROVIDER NOTES ---------------------------------  Consultations:  Spoke with Behavioral health. Discussed case.   They

## 2021-04-06 NOTE — ED NOTES
Spoke to Dr. Gloria Pickett regarding pt acceptance to [facility] and pt will need UA to be completed and if ketones and proteins negative patient is able to transfer to [facility].      Олег Hawk RN  04/06/21 6203

## 2021-04-07 PROBLEM — F32.A DEPRESSION WITH SUICIDAL IDEATION: Status: ACTIVE | Noted: 2021-04-07

## 2021-04-07 PROBLEM — F43.10 PTSD (POST-TRAUMATIC STRESS DISORDER): Status: ACTIVE | Noted: 2021-04-07

## 2021-04-07 PROBLEM — R45.851 DEPRESSION WITH SUICIDAL IDEATION: Status: ACTIVE | Noted: 2021-04-07

## 2021-04-07 PROBLEM — F32.A DEPRESSION WITH SUICIDAL IDEATION: Status: RESOLVED | Noted: 2021-04-07 | Resolved: 2021-04-07

## 2021-04-07 PROBLEM — R45.851 DEPRESSION WITH SUICIDAL IDEATION: Status: RESOLVED | Noted: 2021-04-07 | Resolved: 2021-04-07

## 2021-04-07 PROBLEM — F33.2 SEVERE EPISODE OF RECURRENT MAJOR DEPRESSIVE DISORDER, WITHOUT PSYCHOTIC FEATURES (HCC): Status: ACTIVE | Noted: 2021-04-07

## 2021-04-07 LAB
ANION GAP SERPL CALCULATED.3IONS-SCNC: 12 MMOL/L (ref 7–16)
BACTERIA: ABNORMAL /HPF
BILIRUBIN URINE: ABNORMAL
BLOOD, URINE: NEGATIVE
BUN BLDV-MCNC: 11 MG/DL (ref 6–20)
CALCIUM SERPL-MCNC: 8.1 MG/DL (ref 8.6–10.2)
CHLORIDE BLD-SCNC: 107 MMOL/L (ref 98–107)
CLARITY: CLEAR
CO2: 18 MMOL/L (ref 22–29)
COLOR: YELLOW
CREAT SERPL-MCNC: 0.9 MG/DL (ref 0.7–1.2)
EKG ATRIAL RATE: 90 BPM
EKG P AXIS: 33 DEGREES
EKG P-R INTERVAL: 138 MS
EKG Q-T INTERVAL: 350 MS
EKG QRS DURATION: 84 MS
EKG QTC CALCULATION (BAZETT): 428 MS
EKG R AXIS: 53 DEGREES
EKG T AXIS: 59 DEGREES
EKG VENTRICULAR RATE: 90 BPM
GFR AFRICAN AMERICAN: >60
GFR NON-AFRICAN AMERICAN: >60 ML/MIN/1.73
GLUCOSE BLD-MCNC: 119 MG/DL (ref 74–99)
GLUCOSE URINE: NEGATIVE MG/DL
KETONES, URINE: 15 MG/DL
LEUKOCYTE ESTERASE, URINE: NEGATIVE
NITRITE, URINE: NEGATIVE
PH UA: 5.5 (ref 5–9)
POTASSIUM REFLEX MAGNESIUM: 4.1 MMOL/L (ref 3.5–5)
PROTEIN UA: NEGATIVE MG/DL
RBC UA: ABNORMAL /HPF (ref 0–2)
SODIUM BLD-SCNC: 137 MMOL/L (ref 132–146)
SPECIFIC GRAVITY UA: 1.02 (ref 1–1.03)
UROBILINOGEN, URINE: 0.2 E.U./DL
WBC UA: ABNORMAL /HPF (ref 0–5)

## 2021-04-07 PROCEDURE — 6370000000 HC RX 637 (ALT 250 FOR IP): Performed by: NURSE PRACTITIONER

## 2021-04-07 PROCEDURE — 6370000000 HC RX 637 (ALT 250 FOR IP): Performed by: PSYCHIATRY & NEUROLOGY

## 2021-04-07 PROCEDURE — 80048 BASIC METABOLIC PNL TOTAL CA: CPT

## 2021-04-07 PROCEDURE — 99221 1ST HOSP IP/OBS SF/LOW 40: CPT | Performed by: NURSE PRACTITIONER

## 2021-04-07 PROCEDURE — 81001 URINALYSIS AUTO W/SCOPE: CPT

## 2021-04-07 PROCEDURE — 1240000000 HC EMOTIONAL WELLNESS R&B

## 2021-04-07 RX ORDER — TRAZODONE HYDROCHLORIDE 50 MG/1
50 TABLET ORAL NIGHTLY PRN
Status: DISCONTINUED | OUTPATIENT
Start: 2021-04-07 | End: 2021-04-12 | Stop reason: HOSPADM

## 2021-04-07 RX ORDER — NALTREXONE HYDROCHLORIDE 50 MG/1
50 TABLET, FILM COATED ORAL
Status: DISCONTINUED | OUTPATIENT
Start: 2021-04-08 | End: 2021-04-12 | Stop reason: HOSPADM

## 2021-04-07 RX ORDER — ALBUTEROL SULFATE 2.5 MG/3ML
2.5 SOLUTION RESPIRATORY (INHALATION) EVERY 4 HOURS PRN
Status: DISCONTINUED | OUTPATIENT
Start: 2021-04-07 | End: 2021-04-12 | Stop reason: HOSPADM

## 2021-04-07 RX ORDER — POLYETHYLENE GLYCOL 3350 17 G
2 POWDER IN PACKET (EA) ORAL PRN
Status: DISCONTINUED | OUTPATIENT
Start: 2021-04-07 | End: 2021-04-12 | Stop reason: HOSPADM

## 2021-04-07 RX ORDER — MAGNESIUM HYDROXIDE/ALUMINUM HYDROXICE/SIMETHICONE 120; 1200; 1200 MG/30ML; MG/30ML; MG/30ML
30 SUSPENSION ORAL PRN
Status: DISCONTINUED | OUTPATIENT
Start: 2021-04-07 | End: 2021-04-12 | Stop reason: HOSPADM

## 2021-04-07 RX ORDER — HALOPERIDOL 5 MG/ML
5 INJECTION INTRAMUSCULAR EVERY 6 HOURS PRN
Status: DISCONTINUED | OUTPATIENT
Start: 2021-04-07 | End: 2021-04-12 | Stop reason: HOSPADM

## 2021-04-07 RX ORDER — ACETAMINOPHEN 325 MG/1
650 TABLET ORAL EVERY 6 HOURS PRN
Status: DISCONTINUED | OUTPATIENT
Start: 2021-04-07 | End: 2021-04-12 | Stop reason: HOSPADM

## 2021-04-07 RX ORDER — BUDESONIDE 0.25 MG/2ML
0.25 INHALANT ORAL 2 TIMES DAILY
Status: DISCONTINUED | OUTPATIENT
Start: 2021-04-07 | End: 2021-04-12 | Stop reason: HOSPADM

## 2021-04-07 RX ORDER — HYDROXYZINE PAMOATE 50 MG/1
50 CAPSULE ORAL 3 TIMES DAILY PRN
Status: DISCONTINUED | OUTPATIENT
Start: 2021-04-07 | End: 2021-04-12 | Stop reason: HOSPADM

## 2021-04-07 RX ORDER — PRAZOSIN HYDROCHLORIDE 2 MG/1
2 CAPSULE ORAL NIGHTLY
Status: DISCONTINUED | OUTPATIENT
Start: 2021-04-07 | End: 2021-04-12 | Stop reason: HOSPADM

## 2021-04-07 RX ORDER — HALOPERIDOL 5 MG
5 TABLET ORAL EVERY 6 HOURS PRN
Status: DISCONTINUED | OUTPATIENT
Start: 2021-04-07 | End: 2021-04-12 | Stop reason: HOSPADM

## 2021-04-07 RX ORDER — BUDESONIDE AND FORMOTEROL FUMARATE DIHYDRATE 80; 4.5 UG/1; UG/1
2 AEROSOL RESPIRATORY (INHALATION) 2 TIMES DAILY
Status: DISCONTINUED | OUTPATIENT
Start: 2021-04-07 | End: 2021-04-07 | Stop reason: CLARIF

## 2021-04-07 RX ORDER — ALBUTEROL SULFATE 90 UG/1
2 AEROSOL, METERED RESPIRATORY (INHALATION) EVERY 4 HOURS PRN
Status: DISCONTINUED | OUTPATIENT
Start: 2021-04-07 | End: 2021-04-07 | Stop reason: CLARIF

## 2021-04-07 RX ORDER — ARFORMOTEROL TARTRATE 15 UG/2ML
15 SOLUTION RESPIRATORY (INHALATION) 2 TIMES DAILY
Status: DISCONTINUED | OUTPATIENT
Start: 2021-04-07 | End: 2021-04-12 | Stop reason: HOSPADM

## 2021-04-07 RX ADMIN — PRAZOSIN HYDROCHLORIDE 2 MG: 2 CAPSULE ORAL at 20:56

## 2021-04-07 RX ADMIN — SERTRALINE 25 MG: 50 TABLET, FILM COATED ORAL at 15:34

## 2021-04-07 RX ADMIN — TRAZODONE HYDROCHLORIDE 50 MG: 50 TABLET ORAL at 20:56

## 2021-04-07 RX ADMIN — MAGNESIUM HYDROXIDE/ALUMINUM HYDROXICE/SIMETHICONE 30 ML: 120; 1200; 1200 SUSPENSION ORAL at 04:21

## 2021-04-07 RX ADMIN — NICOTINE POLACRILEX 2 MG: 2 LOZENGE ORAL at 10:49

## 2021-04-07 RX ADMIN — NICOTINE POLACRILEX 2 MG: 2 LOZENGE ORAL at 15:34

## 2021-04-07 ASSESSMENT — LIFESTYLE VARIABLES: HISTORY_ALCOHOL_USE: YES

## 2021-04-07 ASSESSMENT — SLEEP AND FATIGUE QUESTIONNAIRES
DO YOU USE A SLEEP AID: NO
DO YOU HAVE DIFFICULTY SLEEPING: NO
AVERAGE NUMBER OF SLEEP HOURS: 5
AVERAGE NUMBER OF SLEEP HOURS: 5

## 2021-04-07 ASSESSMENT — PAIN SCALES - GENERAL
PAINLEVEL_OUTOF10: 0
PAINLEVEL_OUTOF10: 0

## 2021-04-07 NOTE — PROGRESS NOTES
`[facility]  Admission Note     35yo male admitted from the [facility] AN AFFILIATE OF [facility]. A&Ox4. Walks with a steady gait. Patient currently denies suicidal/homicidal ideations and hallucinations. Patient states \"It was just a combination of a bad day and not leaving when I should have. [place] has hindered more than helped me\". Patient has been at inpatient rehab with [facility] for the past 10 days for alcohol abuse. It was reported from staff there (and per pink slip) that patient was having \"homicidal thoughts\", suicidal thoughts with plan to cut self, and \"seeing shadows out of the corner of his eye\". Patient denies ever having the homicidal thoughts-rather that he was \"preparing to fight someone if they continued\". Patient went on stating, \"I had suicidal tendencies in the past, none now. I said I didn't care if I was alive or dead. But I dont have the courage\". Patient has a HX of suicide attempt at [facility] by \"drinking and taking pills\" and cutting-most recent being 1.5 months ago-healed lacerations observed. Patients reasoning for the visual disturbances was his history of diabetes. He reports having \"eye floaters\". Patient avoided eye contact during assessment. Had clear and organized thoughts. Presented as hopeless and helpless. Flat, sad, and depressed. Upset that the police escorted him to the unit. Tearful at times during the admission. Patient was shown unit and room. Purposeful rounding continued. Admission Type:   Admission Type:  Involuntary    Reason for admission:  Reason for Admission: \"I was admitted against my will because I was too open in a heightened emotional state\"    PATIENT STRENGTHS:  Strengths: Medication Compliance, Positive Support, Connection to output provider    Patient Strengths and Limitations:  Limitations: Multiple barriers to leisure interests, Unrealistic self-view, Tendency to isolate self    Addictive Behavior:   Addictive Behavior  In the past 3 months, have you felt or has someone told you that you have a problem with:  : None  Do you have a history of Chemical Use?: No  Do you have a history of Alcohol Use?: Yes  Do you have a history of Street Drug Abuse?: Yes  Histroy of Prescripton Drug Abuse?: No    Medical Problems:   Past Medical History:   Diagnosis Date    Alcohol abuse     Allergic rhinitis 3/9/2015    Anxiety     Asthma     Bipolar 2 disorder ([address] 3/9/2015    Depression     Generalized anxiety disorder 3/4/2016    With agoraphobia    High anion gap metabolic acidosis 7/95/0635    History of tobacco abuse 2/25/2016    Plantar fasciitis of left foot 3/3/2016    Prolonged INR 2/19/2016    Splenomegaly 3/4/2016    US 3/2016       Status EXAM:  Status and Exam  Normal: No  Facial Expression: Avoids Gaze, Worried, Sad, Flat, Expressionless  Affect: Blunt  Level of Consciousness: Alert  Mood:Normal: No  Mood: Depressed, Anxious, Helpless, Despair  Motor Activity:Normal: No  Motor Activity: Decreased  Interview Behavior: Cooperative  Preception: [place] to Person, Shahida Graver to Time, [place] to Place, [place] to Situation  Attention:Normal: No  Attention: Others(See comment)(appropriate)  Thought Processes: Circumstantial  Thought Content:Normal: Yes  Hallucinations: None  Delusions: No  Memory:Normal: Yes  Insight and Judgment: No  Insight and Judgment: Poor Judgment, Poor Insight  Present Suicidal Ideation: No  Present Homicidal Ideation: No    Tobacco Screening:  Practical Counseling, on admission, adrienne X, if applicable and completed (first 3 are required if patient doesn't refuse):            (x)  Recognizing danger situations (included triggers and roadblocks)                    (x)  Coping skills (new ways to manage stress, exercise, relaxation techniques, changing routine, distraction)                                                           (x)  Basic information about quitting (benefits of quitting, techniques in how to quit, available resources  (x) Referral for counseling faxed to [facility]                                           ( ) Patient refused counseling  ( ) Patient has not smoked in the last 30 days    Metabolic Screening:    Lab Results   Component Value Date    LABA1C 5.3 10/27/2020       Lab Results   Component Value Date    CHOL 174 03/25/2020     Lab Results   Component Value Date    TRIG 271 (H) 03/25/2020    TRIG 216 (H) 02/12/2016    TRIG 298 (H) 02/11/2016    TRIG 377 (H) 02/10/2016    TRIG 360 (H) 02/09/2016    TRIG 673 (H) 02/08/2016     Lab Results   Component Value Date    HDL 28 03/25/2020     No components found for: LDLCAL  Lab Results   Component Value Date    LABVLDL 54 03/25/2020         There is no height or weight on file to calculate BMI. BP Readings from Last 2 Encounters:   04/07/21 (!) 147/91   03/09/21 (!) 146/92           Pt admitted with followings belongings:  Dentures: None  Vision - Corrective Lenses: None  Hearing Aid: None  Jewelry: Other (Comment)(lip ring)  Body Piercings Removed: No  Were All Patient Medications Collected?: Not Applicable     Valuables sent home with NA. Valuables placed in safe in security envelope, number:  [id number]. Patient's home medications were [id number]. Patient oriented to surroundings and program expectations and copy of patient rights given. Received admission packet:  yes. Consents reviewed, signed yes. Refused NA. Patient verbalize understanding:  yes.     Patient education on precautions: yes                   Guille Molina, RN

## 2021-04-07 NOTE — PROGRESS NOTES
Attended afternoon meet and greet. Updated on staffing changes and evening expectations. Participated in would you rather maribella. Patient 1 of 9.

## 2021-04-07 NOTE — ED NOTES
Spoke to Dr. Josiane Campbell regarding patient admission to [facility] and will have IV fluids and repeat UA after fluids. If ketones result normal patient is accepted to [facility].      Bekah Barragan RN  04/06/21 2930

## 2021-04-07 NOTE — PLAN OF CARE
[facility]  Initial Interdisciplinary Treatment Plan NOTE    Review Date & Time: 4/7/2021   10:30 AM      Patient was not in treatment team    Admission Type:   Admission Type:  Involuntary    Reason for admission:  Reason for Admission: \"I was admitted against my will because I was too open in a heightened emotional state\"      Estimated Length of Stay Update:   5 days  Estimated Discharge Date Update:  4/12/21    PATIENT STRENGTHS:  Patient Strengths Strengths: Medication Compliance, Positive Support, Connection to output provider  Patient Strengths and Limitations:Limitations: Multiple barriers to leisure interests, Unrealistic self-view, Tendency to isolate self  Addictive Behavior:Addictive Behavior  In the past 3 months, have you felt or has someone told you that you have a problem with:  : None  Do you have a history of Chemical Use?: No  Do you have a history of Alcohol Use?: Yes  Do you have a history of Street Drug Abuse?: Yes  Histroy of Prescripton Drug Abuse?: No  Medical Problems:  Past Medical History:   Diagnosis Date    Alcohol abuse     Allergic rhinitis 3/9/2015    Anxiety     Asthma     B[address] 2 disorder ([facility] [address] 3/9/2015    Depression     Generalized anxiety disorder 3/4/2016    With agoraphobia    High anion gap metabolic acidosis 9/54/1681    History of tobacco abuse 2/25/2016    Plantar fasciitis of left foot 3/3/2016    Prolonged INR 2/19/2016    Splenomegaly 3/4/2016     3/2016       EDUCATION:   Learner Progress Toward Treatment Goals: Reviewed results and recommendations of this team    Method: Individual    Outcome: Verbalized understanding    PATIENT GOALS:  \"come to terms that I am oink slipped\"    PLAN/TREATMENT RECOMMENDATIONS UPDATE: encourage daily goals and groups    GOALS UPDATE:   Time frame for Short-Term Goals:  By discharge    Josué Serrato RN
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
## 2021-04-07 NOTE — GROUP NOTE
Group Therapy Note    Date: 4/7/2021    Group Start Time: 1115  Group End Time: 1200  Group Topic: Cognitive Skills    [facility]    WEI Springer        Group Therapy Note    Attendees: 15         Patient's Goal:  Pt will be able to identify steps toward resolving conflicts utilizing assertiveness. Notes:  Pt was active in class discussion. Status After Intervention:  Improved    Participation Level:  Active Listener and Interactive    Participation Quality: Appropriate, Attentive, Sharing and Supportive      Speech:  normal      Thought Process/Content: Logical      Affective Functioning: Congruent      Mood: euthymic      Level of consciousness:  Alert, Oriented x4 and Attentive      Response to Learning: Able to verbalize current knowledge/experience, Able to verbalize/acknowledge new learning and Progressing to goal      Endings: None Reported    Modes of Intervention: Education, Support, Socialization and Problem-solving      Discipline Responsible: /Counselor      Signature:  WEI Mendez

## 2021-04-07 NOTE — ED NOTES
Okay to send to floor per . Pt calm and cooperative with care. Pt ate meal and drank 3 cups of water.       Bean Barbosa RN  04/07/21 [id number]

## 2021-04-07 NOTE — PLAN OF CARE
Problem: Depressive Behavior With or Without Suicide Precautions:  Goal: Able to verbalize and/or display a decrease in depressive symptoms  Description: Able to verbalize and/or display a decrease in depressive symptoms  Outcome: Ongoing  Goal: Ability to disclose and discuss suicidal ideas will improve  Description: Ability to disclose and discuss suicidal ideas will improve  Outcome: Ongoing              Patient flat and depressed. Appears worried. Attending groups and is cooperative. Denies suicidal and homicidal thoughts. Denies hallucinations.

## 2021-04-07 NOTE — ED NOTES
Spoke with DR. Varghese regarding ketones in urine.   2 liters of fluids and repeat urine ketones and BMP     Noa Mcintyre RN  04/06/21 1503

## 2021-04-08 LAB
CHOLESTEROL, FASTING: 140 MG/DL (ref 0–199)
HBA1C MFR BLD: 6.9 % (ref 4–5.6)
HDLC SERPL-MCNC: 25 MG/DL
LDL CHOLESTEROL CALCULATED: 91 MG/DL (ref 0–99)
TRIGLYCERIDE, FASTING: 122 MG/DL (ref 0–149)
VLDLC SERPL CALC-MCNC: 24 MG/DL

## 2021-04-08 PROCEDURE — 94664 DEMO&/EVAL PT USE INHALER: CPT

## 2021-04-08 PROCEDURE — 80061 LIPID PANEL: CPT

## 2021-04-08 PROCEDURE — 1240000000 HC EMOTIONAL WELLNESS R&B

## 2021-04-08 PROCEDURE — 6370000000 HC RX 637 (ALT 250 FOR IP): Performed by: NURSE PRACTITIONER

## 2021-04-08 PROCEDURE — 99231 SBSQ HOSP IP/OBS SF/LOW 25: CPT | Performed by: NURSE PRACTITIONER

## 2021-04-08 PROCEDURE — 36415 COLL VENOUS BLD VENIPUNCTURE: CPT

## 2021-04-08 PROCEDURE — 94640 AIRWAY INHALATION TREATMENT: CPT

## 2021-04-08 PROCEDURE — 6360000002 HC RX W HCPCS: Performed by: NURSE PRACTITIONER

## 2021-04-08 PROCEDURE — 6370000000 HC RX 637 (ALT 250 FOR IP): Performed by: PSYCHIATRY & NEUROLOGY

## 2021-04-08 PROCEDURE — 83036 HEMOGLOBIN GLYCOSYLATED A1C: CPT

## 2021-04-08 RX ADMIN — SERTRALINE 25 MG: 50 TABLET, FILM COATED ORAL at 08:18

## 2021-04-08 RX ADMIN — NALTREXONE HYDROCHLORIDE 50 MG: 50 TABLET, FILM COATED ORAL at 08:18

## 2021-04-08 RX ADMIN — TRAZODONE HYDROCHLORIDE 50 MG: 50 TABLET ORAL at 21:10

## 2021-04-08 RX ADMIN — METFORMIN HYDROCHLORIDE 1000 MG: 1000 TABLET ORAL at 08:18

## 2021-04-08 RX ADMIN — ARFORMOTEROL TARTRATE 15 MCG: 15 SOLUTION RESPIRATORY (INHALATION) at 20:53

## 2021-04-08 RX ADMIN — ARFORMOTEROL TARTRATE 15 MCG: 15 SOLUTION RESPIRATORY (INHALATION) at 10:00

## 2021-04-08 RX ADMIN — NICOTINE POLACRILEX 2 MG: 2 LOZENGE ORAL at 12:59

## 2021-04-08 RX ADMIN — BUDESONIDE 250 MCG: 0.25 SUSPENSION RESPIRATORY (INHALATION) at 20:53

## 2021-04-08 RX ADMIN — BUDESONIDE 250 MCG: 0.25 SUSPENSION RESPIRATORY (INHALATION) at 10:00

## 2021-04-08 RX ADMIN — PRAZOSIN HYDROCHLORIDE 2 MG: 2 CAPSULE ORAL at 21:10

## 2021-04-08 RX ADMIN — NICOTINE POLACRILEX 2 MG: 2 LOZENGE ORAL at 16:44

## 2021-04-08 ASSESSMENT — PAIN SCALES - GENERAL: PAINLEVEL_OUTOF10: 0

## 2021-04-08 NOTE — PROGRESS NOTES
BEHAVIORAL HEALTH FOLLOW-UP NOTE     4/8/2021     Patient was seen and examined in person, Chart reviewed   Patient's case discussed with staff/team    Chief Complaint:    \"It doesn't matter what I say to you people. It isn't going to change my situation. \"       Interim History:   Patient is observed in his room this morning. He presents with a very blunted affect. He is irritable however agreeable to speak with me. Overall his perspective of the situation is negative. Stating that it does not matter what I say to you people. It is not going to change my situation. \"  He is agreeable to his medications. He denies suicidal and homicidal ideation intent or plan. Denies auditory or visual hallucinations. His eye contact is evasive. His energy is low his mood is low. He demonstrates limited insight into his need for hospitalization and the events surrounding his hospitalization. He becomes more irritable and attempts to discuss events surrounding hospitalization.     Appetite:   [x] Normal/Unchanged  [] Increased  [] Decreased      Sleep:       [x] Normal/Unchanged  [] Fair       [] Poor              Energy:    [x] Normal/Unchanged  [] Increased  [] Decreased        SI [] Present  [x] Absent    HI  []Present  [x] Absent     Aggression:  [] yes  [x] no    Patient is [x] able  [] unable to CONTRACT FOR SAFETY     PAST MEDICAL/PSYCHIATRIC HISTORY:   Past Medical History:   Diagnosis Date    Alcohol abuse     Allergic rhinitis 3/9/2015    Anxiety     Asthma     Bipolar 2 disorder ([facility] [address] 3/9/2015    Depression     Generalized anxiety disorder 3/4/2016    With agoraphobia    High anion gap metabolic acidosis 7/88/8365    History of tobacco abuse 2/25/2016    Plantar fasciitis of left foot 3/3/2016    Prolonged INR 2/19/2016    Splenomegaly 3/4/2016     3/2016       FAMILY/SOCIAL HISTORY:  Family History   Problem Relation Age of Onset    No Known Problems Mother     Other Father         bipolar Social History     Socioeconomic History    Marital status: Single     Spouse name: Not on file    Number of children: Not on file    Years of education: Not on file    Highest education level: Not on file   Occupational History    Not on file   Social Needs    Financial resource strain: Somewhat hard    Food insecurity     Worry: Never true     Inability: Never true    Transportation needs     Medical: No     Non-medical: No   Tobacco Use    Smoking status: Current Every Day Smoker     Packs/day: 0.25     Years: 11.00     Pack years: 2.75     Types: Cigarettes    Smokeless tobacco: Former User   Substance and Sexual Activity    Alcohol use: Yes     Alcohol/week: 40.0 standard drinks     Types: 40 Shots of liquor per week     Comment: 1 LITER OF 80PROOF VODKA DAILY    Drug use: Yes     Frequency: 1.0 times per week     Types: Marijuana     Comment: once a week while drinking    Sexual activity: Not Currently   Lifestyle    Physical activity     Days per week: Not on file     Minutes per session: Not on file    Stress: Not on file   Relationships    Social connections     Talks on phone: Not on file     Gets together: Not on file     Attends Zoroastrianism service: Not on file     Active member of club or organization: Not on file     Attends meetings of clubs or organizations: Not on file     Relationship status: Not on file    Intimate partner violence     Fear of current or ex partner: Not on file     Emotionally abused: Not on file     Physically abused: Not on file     Forced sexual activity: Not on file   Other Topics Concern    Not on file   Social History Narrative    Not on file           ROS:  [x] All negative/unchanged except if checked.  Explain positive(checked items) below:  [] Constitutional  [] Eyes  [] Ear/Nose/Mouth/Throat  [] Respiratory  [] CV  [] GI  []   [] Musculoskeletal  [] Skin/Breast  [] Neurological  [] Endocrine  [] Heme/Lymph  [] Allergic/Immunologic    Explanation: MEDICATIONS:    Current Facility-Administered Medications:     acetaminophen (TYLENOL) tablet 650 mg, 650 mg, Oral, Q6H PRN, Donaldo Enriquez MD    magnesium hydroxide (MILK OF MAGNESIA) 400 MG/5ML suspension 30 mL, 30 mL, Oral, Daily PRN, Donaldo Enriquez MD    aluminum & magnesium hydroxide-simethicone (MAALOX) 200-200-20 MG/5ML suspension 30 mL, 30 mL, Oral, PRN, Donaldo Enriquez MD, 30 mL at 04/07/21 0421    hydrOXYzine (VISTARIL) capsule 50 mg, 50 mg, Oral, TID PRN, Donaldo Enriquez MD    haloperidol lactate (HALDOL) injection 5 mg, 5 mg, Intramuscular, Q6H PRN **OR** haloperidol (HALDOL) tablet 5 mg, 5 mg, Oral, Q6H PRN, Donaldo Enriquez MD    traZODone (DESYREL) tablet 50 mg, 50 mg, Oral, Nightly PRN, Donaldo Enriquez MD, 50 mg at 04/07/21 2056    nicotine polacrilex (COMMIT) lozenge 2 mg, 2 mg, Oral, PRN, CHUCK Barillas - CNP, 2 mg at 04/07/21 1534    sertraline (ZOLOFT) tablet 25 mg, 25 mg, Oral, Daily, CHUCK Barillas - CNP, 25 mg at 04/08/21 0818    prazosin (MINIPRESS) capsule 2 mg, 2 mg, Oral, Nightly, CHUCK Barillas - CNP, 2 mg at 04/07/21 2056    naltrexone (DEPADE) tablet 50 mg, 50 mg, Oral, Daily with breakfast, CHUCK Barillas - CNP, 50 mg at 04/08/21 0818    metFORMIN (GLUCOPHAGE) tablet 1,000 mg, 1,000 mg, Oral, Daily with breakfast, CHUCK Barillas CNP, 4,564 mg at 04/08/21 0818    albuterol (PROVENTIL) nebulizer solution 2.5 mg, 2.5 mg, Nebulization, B0K PRN, CHUCK Figueroa CNP    budesonide (PULMICORT) nebulizer suspension 250 mcg, 0.25 mg, Nebulization, BID, 250 mcg at 04/08/21 1000 **AND** Arformoterol Tartrate (BROVANA) nebulizer solution 15 mcg, 15 mcg, Nebulization, BID, CHUCK Figueroa CNP, 15 mcg at 04/08/21 1000      Examination:  /66   Pulse 77   Temp 98 °F (36.7 °C) (Temporal)   Resp 18   Ht 5' 7\" (1.702 m)   Wt 208 lb (94.3 kg)   SpO2 99%   BMI 32.58 kg/m²   Gait - steady  Medication side effects(SE): None reported    Mental Status Examination:    Level of consciousness:  within normal limits   Appearance:  poor grooming and poor hygiene  Behavior/Motor:  agitated  Attitude toward examiner:  guarded and withdrawn  Speech:  normal rate and normal volume   Mood: depressed  Affect:  mood congruent and blunted  Thought processes:  goal directed   Thought content: The patient is devoid of suicidal or homicidal ideation intent or plan. Devoid of auditory or visual hallucinations or other perceptual disturbances, there are no overt or covert signs of psychosis or paranoia.     Cognition:  oriented to person, place, and time   Concentration poor  Insight poor   Judgement poor     ASSESSMENT:   Patient symptoms are:  [] Well controlled  [] Improving  [] Worsening  [x] No change      Diagnosis:   Principal Problem:    Severe episode of recurrent major depressive disorder, without psychotic features ([facility] [address]  Active Problems:    Alcohol abuse    PTSD (post-traumatic stress disorder)  Resolved Problems:    Depression with suicidal ideation      LABS:    Recent Labs     04/06/21  1620   WBC 9.4   HGB 17.2*        Recent Labs     04/06/21  1620 04/07/21  0105    137   K 4.2 4.1   CL 98 107   CO2 13* 18*   BUN 11 11   CREATININE 1.0 0.9   GLUCOSE 111* 119*     Recent Labs     04/06/21  1620   BILITOT 0.8   ALKPHOS 69   AST 65*   ALT 73*     Lab Results   Component Value Date    LABAMPH NOT DETECTED 04/06/2021    BARBSCNU POSITIVE 04/06/2021    LABBENZ POSITIVE 04/06/2021    LABMETH NOT DETECTED 04/06/2021    OPIATESCREENURINE NOT DETECTED 04/06/2021    PHENCYCLIDINESCREENURINE NOT DETECTED 04/06/2021    ETOH <10 04/06/2021     Lab Results   Component Value Date    TSH 2.190 03/25/2020     No results found for: LITHIUM  Lab Results   Component Value Date    CBMZ 3.7 (L) 02/18/2016         Treatment Plan:  The patient's diagnosis, treatment plan, medication management were formulated after patient was seen directly by the attending physician and myself and all relevant documentation was reviewed. Reviewed current Medications with the patient. Risk, benefit, side effects, possible outcomes of the medication and alternatives discussed with the patient and the patient demonstrated understanding. The patient was also educated that the outcome of treatment will depend on the medication compliance as directed by the prescribers along with regular follow-up, compliance with the labs and other work-up, as clinically indicated. Continue naltrexone 50 mg daily for cessation of alcohol. Continue prazosin 2 mg nightly for sleep  Continue Zoloft 25 mg daily for depression  Continue other medications as indicated        Collateral information: Followed by social work   CD evaluation  Encourage patient to attend group and other milieu activities.   Discharge planning discussed with the patient and treatment team.    PSYCHOTHERAPY/COUNSELING:  [x] Therapeutic interview  [x] Supportive  [] CBT  [] Ongoing  [] Other    [x] Patient continues to need, on a daily basis, active treatment furnished directly by or requiring the supervision of inpatient psychiatric personnel      Anticipated Length of stay: 5 to 10 days based on stability            Electronically signed by CHUCK Dorantes CNP on 4/8/2021 at 11:52 AM

## 2021-04-08 NOTE — PLAN OF CARE
Problem: Altered Mood, Depressive Behavior:  Goal: Able to verbalize acceptance of life and situations over which he or she has no control  Description: Able to verbalize acceptance of life and situations over which he or she has no control  Outcome: Ongoing     Problem: Depressive Behavior With or Without Suicide Precautions:  Goal: Able to verbalize and/or display a decrease in depressive symptoms  Description: Able to verbalize and/or display a decrease in depressive symptoms  4/7/2021 2044 by Trish Hernandez RN  Outcome: Ongoing     Patient has been withdrawn to his room. Calm and cooperative during conversation. Flat affect/depressed mood. Patient stated that he is \"trying to make the best out of the situation\" and that he is \"as well as I can be\". Denies suicidal/homicidal ideations and hallucinations. Rates his anxiety and depression 3/10. Purposeful rounding continued.

## 2021-04-09 PROCEDURE — 6370000000 HC RX 637 (ALT 250 FOR IP): Performed by: NURSE PRACTITIONER

## 2021-04-09 PROCEDURE — 6360000002 HC RX W HCPCS: Performed by: NURSE PRACTITIONER

## 2021-04-09 PROCEDURE — 99232 SBSQ HOSP IP/OBS MODERATE 35: CPT | Performed by: NURSE PRACTITIONER

## 2021-04-09 PROCEDURE — 94640 AIRWAY INHALATION TREATMENT: CPT

## 2021-04-09 PROCEDURE — 1240000000 HC EMOTIONAL WELLNESS R&B

## 2021-04-09 PROCEDURE — 6370000000 HC RX 637 (ALT 250 FOR IP): Performed by: PSYCHIATRY & NEUROLOGY

## 2021-04-09 RX ADMIN — ARFORMOTEROL TARTRATE 15 MCG: 15 SOLUTION RESPIRATORY (INHALATION) at 21:05

## 2021-04-09 RX ADMIN — TRAZODONE HYDROCHLORIDE 50 MG: 50 TABLET ORAL at 20:32

## 2021-04-09 RX ADMIN — NICOTINE POLACRILEX 2 MG: 2 LOZENGE ORAL at 20:32

## 2021-04-09 RX ADMIN — NICOTINE POLACRILEX 2 MG: 2 LOZENGE ORAL at 08:11

## 2021-04-09 RX ADMIN — SERTRALINE 25 MG: 50 TABLET, FILM COATED ORAL at 09:35

## 2021-04-09 RX ADMIN — METFORMIN HYDROCHLORIDE 1000 MG: 1000 TABLET ORAL at 09:34

## 2021-04-09 RX ADMIN — ARFORMOTEROL TARTRATE 15 MCG: 15 SOLUTION RESPIRATORY (INHALATION) at 09:42

## 2021-04-09 RX ADMIN — NICOTINE POLACRILEX 2 MG: 2 LOZENGE ORAL at 12:12

## 2021-04-09 RX ADMIN — PRAZOSIN HYDROCHLORIDE 2 MG: 2 CAPSULE ORAL at 20:32

## 2021-04-09 RX ADMIN — BUDESONIDE 250 MCG: 0.25 SUSPENSION RESPIRATORY (INHALATION) at 09:42

## 2021-04-09 RX ADMIN — NALTREXONE HYDROCHLORIDE 50 MG: 50 TABLET, FILM COATED ORAL at 09:34

## 2021-04-09 RX ADMIN — BUDESONIDE 250 MCG: 0.25 SUSPENSION RESPIRATORY (INHALATION) at 21:05

## 2021-04-09 ASSESSMENT — PAIN SCALES - GENERAL: PAINLEVEL_OUTOF10: 0

## 2021-04-09 NOTE — BH NOTE
[address] [place] [address]  Day 3 Interdisciplinary Treatment Plan NOTE    Review Date & Time: 4-9-21  930 am    Patient was in treatment team    Admission Type:   Admission Type: Involuntary    Reason for admission:  Reason for Admission: \"I was admitted against my will because I was too open in a heightened emotional state\"  Estimated Length of Stay Update:  2-4 days  Estimated Discharge Date Update: 2-4 days    PATIENT STRENGTHS:  Patient Strengths Strengths: Connection to output provider, Medication Compliance  Patient Strengths and Limitations:Limitations: Multiple barriers to leisure interests, Inappropriate/potentially harmful leisure interests, Difficulty problem solving/relies on others to help solve problems  Addictive Behavior:Addictive Behavior  In the past 3 months, have you felt or has someone told you that you have a problem with:  : None  Do you have a history of Chemical Use?: No  Do you have a history of Alcohol Use?: Yes  Do you have a history of Street Drug Abuse?: Yes  Histroy of Prescripton Drug Abuse?: No  Medical Problems:  Past Medical History:   Diagnosis Date    Alcohol abuse     Allergic rhinitis 3/9/2015    Anxiety     Asthma     Bipolar 2 disorder ([facility] [address] 3/9/2015    Depression     Generalized anxiety disorder 3/4/2016    With agoraphobia    High anion gap metabolic acidosis 9/03/0630    History of tobacco abuse 2/25/2016    Plantar fasciitis of left foot 3/3/2016    Prolonged INR 2/19/2016    Splenomegaly 3/4/2016     3/2016       Risk:  Fall RiskTotal: 61  Manoj Scale Manoj Scale Score: 22  BVC Total: 0  Change in scores:0. Changes to plan of Care: continue to monitor.     Status EXAM:   Status and Exam  Normal: No  Facial Expression: Flat  Affect: Congruent  Level of Consciousness: Alert  Mood:Normal: No  Mood: Depressed  Motor Activity:Normal: No  Motor Activity: Decreased  Interview Behavior: Cooperative  Preception: [place] to Person, [place] to Time  Attention:Normal: No  Attention: Distractible  Thought Processes: Circumstantial  Thought Content:Normal: Yes  Hallucinations: None  Delusions: No  Memory:Normal: Yes  Insight and Judgment: No  Insight and Judgment: Poor Insight  Present Suicidal Ideation: No  Present Homicidal Ideation: No    Daily Assessment Last Entry:   Daily Sleep (WDL): Within Defined Limits         Patient Currently in Pain: Denies  Daily Nutrition (WDL): Within Defined Limits  Barriers to Nutrition: Blood sugar control  Level of Assistance: Independent/Self    Patient Monitoring:  Frequency of Checks: 4 times per hour, close    Psychiatric Symptoms:   Depression Symptoms  Depression Symptoms: No problems reported or observed. (ALL, pt sleeping at this time.)  Anxiety Symptoms  Anxiety Symptoms: No problems reported or observed. (ALL, pt sleeping at this time.)  Domitila Symptoms  Domitila Symptoms: No problems reported or observed. Psychosis Symptoms  Delusion Type: No problems reported or observed. Suicide Risk CSSR-S:  1) Within the past month, have you wished you were dead or wished you could go to sleep and not wake up? : Yes  2) Have you actually had any thoughts of killing yourself? : No  6) Have you ever done anything, started to do anything, or prepared to do anything to end your life?: Yes  Change in Result: pt denies all. Change in Plan of care: continue to monitor pt progress. EDUCATION:   Learner Progress Toward Treatment Goals: Reviewed results and recommendations of this team and Reviewed group plan and strategies    Method: Small group    Outcome: Verbalized understanding    PATIENT GOALS: \"to finish book\" pr pt. PLAN/TREATMENT RECOMMENDATIONS UPDATE: continue to monitor and adjust meds as Dr team assesses daily. GOALS UPDATE:   Time frame for Short-Term Goals: Daily re assessment.        Marshal Pickering RN

## 2021-04-09 NOTE — PROGRESS NOTES
BEHAVIORAL HEALTH FOLLOW-UP NOTE     4/9/2021     Patient was seen and examined in person, Chart reviewed   Patient's case discussed with staff/team    Chief Complaint: \"I am medical with my mom and I leave here. \"       Interim History:   Patient seen during treatment team.  He has an irritable affect he denies all symptoms denies SI/HI intent or plan denies any auditory visual hallucinations. He shows very poor limited insight and judgment to hospitalization need for treatment. He does not want to go back to any inpatient drug rehab and wants to go live with his parents on discharge.         Appetite:   [x] Normal/Unchanged  [] Increased  [] Decreased      Sleep:       [x] Normal/Unchanged  [] Fair       [] Poor              Energy:    [x] Normal/Unchanged  [] Increased  [] Decreased        SI [] Present  [x] Absent    HI  []Present  [x] Absent     Aggression:  [] yes  [x] no    Patient is [x] able  [] unable to CONTRACT FOR SAFETY     PAST MEDICAL/PSYCHIATRIC HISTORY:   Past Medical History:   Diagnosis Date    Alcohol abuse     Allergic rhinitis 3/9/2015    Anxiety     Asthma     Bipolar 2 disorder ([facility] [address] 3/9/2015    Depression     Generalized anxiety disorder 3/4/2016    With agoraphobia    High anion gap metabolic acidosis 9/96/1836    History of tobacco abuse 2/25/2016    Plantar fasciitis of left foot 3/3/2016    Prolonged INR 2/19/2016    Splenomegaly 3/4/2016     3/2016       FAMILY/SOCIAL HISTORY:  Family History   Problem Relation Age of Onset    No Known Problems Mother     Other Father         bipolar     Social History     Socioeconomic History    Marital status: Single     Spouse name: Not on file    Number of children: Not on file    Years of education: Not on file    Highest education level: Not on file   Occupational History    Not on file   Social Needs    Financial resource strain: Somewhat hard    Food insecurity     Worry: Never true     Inability: Never true   [facility] Transportation needs     Medical: No     Non-medical: No   Tobacco Use    Smoking status: Current Every Day Smoker     Packs/day: 0.25     Years: 11.00     Pack years: 2.75     Types: Cigarettes    Smokeless tobacco: Former User   Substance and Sexual Activity    Alcohol use: Yes     Alcohol/week: 40.0 standard drinks     Types: 40 Shots of liquor per week     Comment: 1 LITER OF 80PROOF VODKA DAILY    Drug use: Yes     Frequency: 1.0 times per week     Types: Marijuana     Comment: once a week while drinking    Sexual activity: Not Currently   Lifestyle    Physical activity     Days per week: Not on file     Minutes per session: Not on file    Stress: Not on file   Relationships    Social connections     Talks on phone: Not on file     Gets together: Not on file     Attends Jainism service: Not on file     Active member of club or organization: Not on file     Attends meetings of clubs or organizations: Not on file     Relationship status: Not on file    Intimate partner violence     Fear of current or ex partner: Not on file     Emotionally abused: Not on file     Physically abused: Not on file     Forced sexual activity: Not on file   Other Topics Concern    Not on file   Social History Narrative    Not on file           ROS:  [x] All negative/unchanged except if checked.  Explain positive(checked items) below:  [] Constitutional  [] Eyes  [] Ear/Nose/Mouth/Throat  [] Respiratory  [] CV  [] GI  []   [] Musculoskeletal  [] Skin/Breast  [] Neurological  [] Endocrine  [] Heme/Lymph  [] Allergic/Immunologic    Explanation:     MEDICATIONS:    Current Facility-Administered Medications:     acetaminophen (TYLENOL) tablet 650 mg, 650 mg, Oral, Q6H PRN, Meghan Little MD    magnesium hydroxide (MILK OF MAGNESIA) 400 MG/5ML suspension 30 mL, 30 mL, Oral, Daily PRN, Meghan Little MD    aluminum & magnesium hydroxide-simethicone (MAALOX) 200-200-20 MG/5ML suspension 30 mL, 30 mL, Oral, PRN, Nelda Mess Jd Mason MD, 30 mL at 04/07/21 0421    hydrOXYzine (VISTARIL) capsule 50 mg, 50 mg, Oral, TID PRN, Yonathan Gomez MD    haloperidol lactate (HALDOL) injection 5 mg, 5 mg, Intramuscular, Q6H PRN **OR** haloperidol (HALDOL) tablet 5 mg, 5 mg, Oral, Q6H PRN, Yonathan Gomez MD    traZODone (DESYREL) tablet 50 mg, 50 mg, Oral, Nightly PRN, Yonathan Gomez MD, 50 mg at 04/08/21 2110    nicotine polacrilex (COMMIT) lozenge 2 mg, 2 mg, Oral, PRN, Link Sostefani APRN - CNP, 2 mg at 04/09/21 1212    sertraline (ZOLOFT) tablet 25 mg, 25 mg, Oral, Daily, Link Soho, APRN - CNP, 25 mg at 04/09/21 0935    prazosin (MINIPRESS) capsule 2 mg, 2 mg, Oral, Nightly, Link Soho APRN - CNP, 2 mg at 04/08/21 2110    naltrexone (DEPADE) tablet 50 mg, 50 mg, Oral, Daily with breakfast, Link Soho, APRN - CNP, 50 mg at 04/09/21 0934    metFORMIN (GLUCOPHAGE) tablet 1,000 mg, 1,000 mg, Oral, Daily with breakfast, Link Soho, APRN - CNP, 1,405 mg at 04/09/21 0934    albuterol (PROVENTIL) nebulizer solution 2.5 mg, 2.5 mg, Nebulization, O6L PRN, Anastasiya Zuniga APRN - CNP    budesonide (PULMICORT) nebulizer suspension 250 mcg, 0.25 mg, Nebulization, BID, 250 mcg at 04/09/21 0942 **AND** Arformoterol Tartrate (BROVANA) nebulizer solution 15 mcg, 15 mcg, Nebulization, BID, Anastasiya Zuniga, APRN - CNP, 15 mcg at 04/09/21 1059      Examination:  /60   Pulse 58   Temp 98 °F (36.7 °C) (Temporal)   Resp 16   Ht 5' 7\" (1.702 m)   Wt 208 lb (94.3 kg)   SpO2 99%   BMI 32.58 kg/m²   Gait - steady  Medication side effects(SE): None reported    Mental Status Examination:    Level of consciousness:  within normal limits   Appearance:  poor grooming and poor hygiene  Behavior/Motor:  agitated  Attitude toward examiner:  guarded and withdrawn  Speech:  normal rate and normal volume   Mood: depressed  Affect:  mood congruent and blunted  Thought processes:  goal directed   Thought content: The patient is devoid of suicidal or homicidal ideation intent or plan. Devoid of auditory or visual hallucinations or other perceptual disturbances, there are no overt or covert signs of psychosis or paranoia. Cognition:  oriented to person, place, and time   Concentration poor  Insight poor   Judgement poor     ASSESSMENT:   Patient symptoms are:  [] Well controlled  [] Improving  [] Worsening  [x] No change      Diagnosis:   Principal Problem:    Severe episode of recurrent major depressive disorder, without psychotic features ([facility] [address]  Active Problems:    Alcohol abuse    PTSD (post-traumatic stress disorder)  Resolved Problems:    Depression with suicidal ideation      LABS:    Recent Labs     04/06/21  1620   WBC 9.4   HGB 17.2*        Recent Labs     04/06/21  1620 04/07/21  0105    137   K 4.2 4.1   CL 98 107   CO2 13* 18*   BUN 11 11   CREATININE 1.0 0.9   GLUCOSE 111* 119*     Recent Labs     04/06/21  1620   BILITOT 0.8   ALKPHOS 69   AST 65*   ALT 73*     Lab Results   Component Value Date    LABAMPH NOT DETECTED 04/06/2021    BARBSCNU POSITIVE 04/06/2021    LABBENZ POSITIVE 04/06/2021    LABMETH NOT DETECTED 04/06/2021    OPIATESCREENURINE NOT DETECTED 04/06/2021    PHENCYCLIDINESCREENURINE NOT DETECTED 04/06/2021    ETOH <10 04/06/2021     Lab Results   Component Value Date    TSH 2.190 03/25/2020     No results found for: LITHIUM  Lab Results   Component Value Date    CBMZ 3.7 (L) 02/18/2016         Treatment Plan:  The patient's diagnosis, treatment plan, medication management were formulated after patient was seen directly by the attending physician and myself and all relevant documentation was reviewed. Reviewed current Medications with the patient. Risk, benefit, side effects, possible outcomes of the medication and alternatives discussed with the patient and the patient demonstrated understanding.   The patient was also educated that the outcome of treatment will depend on the medication compliance as directed by the prescribers along with regular follow-up, compliance with the labs and other work-up, as clinically indicated. Continue naltrexone 50 mg daily for cessation of alcohol. Continue prazosin 2 mg nightly for sleep  Increase Zoloft 50 mg daily for depression  Continue other medications as indicated        Collateral information: Followed by social work   CD evaluation  Encourage patient to attend group and other milieu activities.   Discharge planning discussed with the patient and treatment team.    PSYCHOTHERAPY/COUNSELING:  [x] Therapeutic interview  [x] Supportive  [] CBT  [] Ongoing  [] Other    [x] Patient continues to need, on a daily basis, active treatment furnished directly by or requiring the supervision of inpatient psychiatric personnel      Anticipated Length of stay: 5 to 10 days based on stability            Electronically signed by CHUCK Basurto CNP on 1/7/5142 at 12:42 PM

## 2021-04-09 NOTE — PLAN OF CARE
Patient denies suicidal ideation, homicidal ideations and AVH. Denies anxiety and depression. Presents calm and cooperative during assessment. Patient is isolative to room and does not appear to be social with peers. Medications taken without issue. No complaints or concerns verbalized at this time. No unit problems reported. Will continue to observe and support.     Problem: Altered Mood, Depressive Behavior:  Goal: Able to verbalize acceptance of life and situations over which he or she has no control  Description: Able to verbalize acceptance of life and situations over which he or she has no control  Outcome: Ongoing     Problem: Altered Mood, Depressive Behavior:  Goal: Able to verbalize and/or display a decrease in depressive symptoms  Description: Able to verbalize and/or display a decrease in depressive symptoms  Outcome: Ongoing     Problem: Depressive Behavior With or Without Suicide Precautions:  Goal: Able to verbalize and/or display a decrease in depressive symptoms  Description: Able to verbalize and/or display a decrease in depressive symptoms  Outcome: Ongoing

## 2021-04-09 NOTE — BH NOTE
This RN had spoken with pt's mother who had called unit via phone. Pt's mother was very verbally abusive / aggressive. Difficult to explain course of care with mother because of constant interruptions by her. Mother was demanding to speak with Nurse Practitioner. It was explained that a message will be left for the practitioner to call tomorrow morning. Mother responded with additional verbal abuse, \"You guys are doing nothing. \" \"Your psychiatric unit sucks. \" \"My son is in skilled nursing there\". \"The Dr. Miranda Barrera do anything. \" \"You are all lazy. \" As some of the sentences verbalized during this conversation. Extensive efforts were made to reason with the pt.'s mother with little effect of understanding. At the conclusion of the conversation this RN again made the offer of advising the NP offer the request to call her. Mother replied, \"OK, bye\".

## 2021-04-10 PROCEDURE — 6370000000 HC RX 637 (ALT 250 FOR IP): Performed by: PSYCHIATRY & NEUROLOGY

## 2021-04-10 PROCEDURE — 1240000000 HC EMOTIONAL WELLNESS R&B

## 2021-04-10 PROCEDURE — 6360000002 HC RX W HCPCS: Performed by: NURSE PRACTITIONER

## 2021-04-10 PROCEDURE — 6370000000 HC RX 637 (ALT 250 FOR IP): Performed by: NURSE PRACTITIONER

## 2021-04-10 PROCEDURE — 94640 AIRWAY INHALATION TREATMENT: CPT

## 2021-04-10 PROCEDURE — 99231 SBSQ HOSP IP/OBS SF/LOW 25: CPT | Performed by: NURSE PRACTITIONER

## 2021-04-10 RX ADMIN — BUDESONIDE 250 MCG: 0.25 SUSPENSION RESPIRATORY (INHALATION) at 21:38

## 2021-04-10 RX ADMIN — ARFORMOTEROL TARTRATE 15 MCG: 15 SOLUTION RESPIRATORY (INHALATION) at 08:42

## 2021-04-10 RX ADMIN — NICOTINE POLACRILEX 2 MG: 2 LOZENGE ORAL at 18:06

## 2021-04-10 RX ADMIN — TRAZODONE HYDROCHLORIDE 50 MG: 50 TABLET ORAL at 22:36

## 2021-04-10 RX ADMIN — METFORMIN HYDROCHLORIDE 1000 MG: 1000 TABLET ORAL at 09:38

## 2021-04-10 RX ADMIN — SERTRALINE 50 MG: 50 TABLET, FILM COATED ORAL at 09:38

## 2021-04-10 RX ADMIN — ARFORMOTEROL TARTRATE 15 MCG: 15 SOLUTION RESPIRATORY (INHALATION) at 21:38

## 2021-04-10 RX ADMIN — NALTREXONE HYDROCHLORIDE 50 MG: 50 TABLET, FILM COATED ORAL at 09:38

## 2021-04-10 RX ADMIN — NICOTINE POLACRILEX 2 MG: 2 LOZENGE ORAL at 14:02

## 2021-04-10 RX ADMIN — PRAZOSIN HYDROCHLORIDE 2 MG: 2 CAPSULE ORAL at 22:36

## 2021-04-10 RX ADMIN — BUDESONIDE 250 MCG: 0.25 SUSPENSION RESPIRATORY (INHALATION) at 08:42

## 2021-04-10 NOTE — PROGRESS NOTES
BEHAVIORAL HEALTH FOLLOW-UP NOTE     4/10/2021     Patient was seen and examined in person, Chart reviewed   Patient's case discussed with staff/team    Chief Complaint: \"I am doing better. \"       Interim History:   Patient seen in his room this morning. He has less irritable affect this morning. He denies all symptoms denies SI/HI intent or plan denies any auditory visual hallucinations. He shows very poor limited insight and judgment to hospitalization need for treatment. He does not want to go back to any inpatient drug rehab and wants to go live with his parents on discharge. He does remain sad, low energy and depressed. Will increase Zoloft to 75 mg daily. Consider adding Trileptal 150 mg twice daily to brighten mood.       Appetite:   [x] Normal/Unchanged  [] Increased  [] Decreased      Sleep:       [x] Normal/Unchanged  [] Fair       [] Poor              Energy:    [x] Normal/Unchanged  [] Increased  [] Decreased        SI [] Present  [x] Absent    HI  []Present  [x] Absent     Aggression:  [] yes  [x] no    Patient is [x] able  [] unable to CONTRACT FOR SAFETY     PAST MEDICAL/PSYCHIATRIC HISTORY:   Past Medical History:   Diagnosis Date    Alcohol abuse     Allergic rhinitis 3/9/2015    Anxiety     Asthma     Bipolar 2 disorder ([facility] [address] 3/9/2015    Depression     Generalized anxiety disorder 3/4/2016    With agoraphobia    High anion gap metabolic acidosis 2/89/6726    History of tobacco abuse 2/25/2016    Plantar fasciitis of left foot 3/3/2016    Prolonged INR 2/19/2016    Splenomegaly 3/4/2016     3/2016       FAMILY/SOCIAL HISTORY:  Family History   Problem Relation Age of Onset    No Known Problems Mother     Other Father         bipolar     Social History     Socioeconomic History    Marital status: Single     Spouse name: Not on file    Number of children: Not on file    Years of education: Not on file    Highest education level: Not on file   Occupational History    Not on file   Social Needs    Financial resource strain: Somewhat hard    Food insecurity     Worry: Never true     Inability: Never true    Transportation needs     Medical: No     Non-medical: No   Tobacco Use    Smoking status: Current Every Day Smoker     Packs/day: 0.25     Years: 11.00     Pack years: 2.75     Types: Cigarettes    Smokeless tobacco: Former User   Substance and Sexual Activity    Alcohol use: Yes     Alcohol/week: 40.0 standard drinks     Types: 40 Shots of liquor per week     Comment: 1 LITER OF 80PROOF VODKA DAILY    Drug use: Yes     Frequency: 1.0 times per week     Types: Marijuana     Comment: once a week while drinking    Sexual activity: Not Currently   Lifestyle    Physical activity     Days per week: Not on file     Minutes per session: Not on file    Stress: Not on file   Relationships    Social connections     Talks on phone: Not on file     Gets together: Not on file     Attends Bahai service: Not on file     Active member of club or organization: Not on file     Attends meetings of clubs or organizations: Not on file     Relationship status: Not on file    Intimate partner violence     Fear of current or ex partner: Not on file     Emotionally abused: Not on file     Physically abused: Not on file     Forced sexual activity: Not on file   Other Topics Concern    Not on file   Social History Narrative    Not on file           ROS:  [x] All negative/unchanged except if checked.  Explain positive(checked items) below:  [] Constitutional  [] Eyes  [] Ear/Nose/Mouth/Throat  [] Respiratory  [] CV  [] GI  []   [] Musculoskeletal  [] Skin/Breast  [] Neurological  [] Endocrine  [] Heme/Lymph  [] Allergic/Immunologic    Explanation:     MEDICATIONS:    Current Facility-Administered Medications:     sertraline (ZOLOFT) tablet 50 mg, 50 mg, Oral, Daily, CHUCK Rosales - CNP    acetaminophen (TYLENOL) tablet 650 mg, 650 mg, Oral, Q6H PRN, Shelby Baeza MD    magnesium depressed  Affect:  mood congruent and blunted  Thought processes:  goal directed   Thought content: The patient is devoid of suicidal or homicidal ideation intent or plan. Devoid of auditory or visual hallucinations or other perceptual disturbances, there are no overt or covert signs of psychosis or paranoia. Cognition:  oriented to person, place, and time   Concentration poor  Insight poor   Judgement poor     ASSESSMENT:   Patient symptoms are:  [] Well controlled  [] Improving  [] Worsening  [x] No change      Diagnosis:   Principal Problem:    Severe episode of recurrent major depressive disorder, without psychotic features ([address]  Active Problems:    Alcohol abuse    PTSD (post-traumatic stress disorder)  Resolved Problems:    Depression with suicidal ideation      LABS:    No results for input(s): WBC, HGB, PLT in the last 72 hours. No results for input(s): NA, K, CL, CO2, BUN, CREATININE, GLUCOSE in the last 72 hours. No results for input(s): BILITOT, ALKPHOS, AST, ALT in the last 72 hours. Lab Results   Component Value Date    LABAMPH NOT DETECTED 04/06/2021    BARBSCNU POSITIVE 04/06/2021    LABBENZ POSITIVE 04/06/2021    LABMETH NOT DETECTED 04/06/2021    OPIATESCREENURINE NOT DETECTED 04/06/2021    PHENCYCLIDINESCREENURINE NOT DETECTED 04/06/2021    ETOH <10 04/06/2021     Lab Results   Component Value Date    TSH 2.190 03/25/2020     No results found for: LITHIUM  Lab Results   Component Value Date    CBMZ 3.7 (L) 02/18/2016         Treatment Plan:  The patient's diagnosis, treatment plan, medication management were formulated after patient was seen directly by the attending physician and myself and all relevant documentation was reviewed. Reviewed current Medications with the patient. Risk, benefit, side effects, possible outcomes of the medication and alternatives discussed with the patient and the patient demonstrated understanding.   The patient was also educated that the outcome of treatment will depend on the medication compliance as directed by the prescribers along with regular follow-up, compliance with the labs and other work-up, as clinically indicated. Continue naltrexone 50 mg daily for cessation of alcohol. Continue prazosin 2 mg nightly for sleep  Increase Zoloft 75 mg daily for depression  Continue other medications as indicated        Collateral information: Followed by social work   CD evaluation  Encourage patient to attend group and other milieu activities.   Discharge planning discussed with the patient and treatment team.    PSYCHOTHERAPY/COUNSELING:  [x] Therapeutic interview  [x] Supportive  [] CBT  [] Ongoing  [] Other    [x] Patient continues to need, on a daily basis, active treatment furnished directly by or requiring the supervision of inpatient psychiatric personnel      Anticipated Length of stay: 5 to 10 days based on stability            Electronically signed by CHUCK Galicia CNP on 4/10/2021 at 7:58 AM

## 2021-04-10 NOTE — PLAN OF CARE
Problem: Altered Mood, Depressive Behavior:  Goal: Able to verbalize acceptance of life and situations over which he or she has no control  Description: Able to verbalize acceptance of life and situations over which he or she has no control  4/10/2021 1021 by Emir Burrows RN  Outcome: Ongoing  Goal: Able to verbalize and/or display a decrease in depressive symptoms  Description: Able to verbalize and/or display a decrease in depressive symptoms  4/10/2021 1021 by Emir Burrows RN  Outcome: Ongoing     Problem: Depressive Behavior With or Without Suicide Precautions:  Goal: Ability to disclose and discuss suicidal ideas will improve  Description: Ability to disclose and discuss suicidal ideas will improve  4/10/2021 1656 by Socorro Murdock RN  Outcome: Met This Shift  4/10/2021 1021 by Emir Burrows RN  Outcome: Ongoing     Pt denies suicidal ideations, homicidal ideations and hallucinations. Pt was reading a book in his room. Calm and cooperative. Avoids gaze. Sad and anxious. Appetite appropriate. Medication compliant. Will continue to monitor.

## 2021-04-11 PROCEDURE — 6370000000 HC RX 637 (ALT 250 FOR IP): Performed by: NURSE PRACTITIONER

## 2021-04-11 PROCEDURE — 6370000000 HC RX 637 (ALT 250 FOR IP): Performed by: PSYCHIATRY & NEUROLOGY

## 2021-04-11 PROCEDURE — 99231 SBSQ HOSP IP/OBS SF/LOW 25: CPT | Performed by: NURSE PRACTITIONER

## 2021-04-11 PROCEDURE — 1240000000 HC EMOTIONAL WELLNESS R&B

## 2021-04-11 RX ADMIN — NALTREXONE HYDROCHLORIDE 50 MG: 50 TABLET, FILM COATED ORAL at 09:59

## 2021-04-11 RX ADMIN — NICOTINE POLACRILEX 2 MG: 2 LOZENGE ORAL at 09:59

## 2021-04-11 RX ADMIN — PRAZOSIN HYDROCHLORIDE 2 MG: 2 CAPSULE ORAL at 22:48

## 2021-04-11 RX ADMIN — TRAZODONE HYDROCHLORIDE 50 MG: 50 TABLET ORAL at 22:48

## 2021-04-11 RX ADMIN — SERTRALINE 75 MG: 50 TABLET, FILM COATED ORAL at 09:59

## 2021-04-11 RX ADMIN — METFORMIN HYDROCHLORIDE 1000 MG: 1000 TABLET ORAL at 09:59

## 2021-04-11 NOTE — PROGRESS NOTES
BEHAVIORAL HEALTH FOLLOW-UP NOTE     4/11/2021     Patient was seen and examined in person, Chart reviewed   Patient's case discussed with staff/team    Chief Complaint: \"I am doing better. \"       Interim History:   Patient seen in his room this morning. He has less irritable affect this morning. He denies all symptoms denies SI/HI intent or plan denies any auditory visual hallucinations. Patient remains isolative, stating that he just does not like people. He continues to endorse that his depression and anxiety are improving. He shows very poor limited insight and judgment to hospitalization need for treatment. He does not want to go back to any inpatient drug rehab and wants to go live with his parents on discharge. He does remain sad, low energy and depressed. Will increase Zoloft to 75 mg daily.       Appetite:   [x] Normal/Unchanged  [] Increased  [] Decreased      Sleep:       [x] Normal/Unchanged  [] Fair       [] Poor              Energy:    [x] Normal/Unchanged  [] Increased  [] Decreased        SI [] Present  [x] Absent    HI  []Present  [x] Absent     Aggression:  [] yes  [x] no    Patient is [x] able  [] unable to CONTRACT FOR SAFETY     PAST MEDICAL/PSYCHIATRIC HISTORY:   Past Medical History:   Diagnosis Date    Alcohol abuse     Allergic rhinitis 3/9/2015    Anxiety     Asthma     Bipolar 2 disorder ([facility] [address] 3/9/2015    Depression     Generalized anxiety disorder 3/4/2016    With agoraphobia    High anion gap metabolic acidosis 7/70/4884    History of tobacco abuse 2/25/2016    Plantar fasciitis of left foot 3/3/2016    Prolonged INR 2/19/2016    Splenomegaly 3/4/2016     3/2016       FAMILY/SOCIAL HISTORY:  Family History   Problem Relation Age of Onset    No Known Problems Mother     Other Father         bipolar     Social History     Socioeconomic History    Marital status: Single     Spouse name: Not on file    Number of children: Not on file    Years of education: Not on 8922    acetaminophen (TYLENOL) tablet 650 mg, 650 mg, Oral, Q6H PRN, Zak Palacios MD    magnesium hydroxide (MILK OF MAGNESIA) 400 MG/5ML suspension 30 mL, 30 mL, Oral, Daily PRN, Zak Palacios MD    aluminum & magnesium hydroxide-simethicone (MAALOX) 200-200-20 MG/5ML suspension 30 mL, 30 mL, Oral, PRN, Zak Palacios MD, 30 mL at 04/07/21 0421    hydrOXYzine (VISTARIL) capsule 50 mg, 50 mg, Oral, TID PRN, Zak Palacios MD    haloperidol lactate (HALDOL) injection 5 mg, 5 mg, Intramuscular, Q6H PRN **OR** haloperidol (HALDOL) tablet 5 mg, 5 mg, Oral, Q6H PRN, Zak Palacios MD    traZODone (DESYREL) tablet 50 mg, 50 mg, Oral, Nightly PRN, Zak Palacios MD, 50 mg at 04/10/21 2236    nicotine polacrilex (COMMIT) lozenge 2 mg, 2 mg, Oral, PRN, CHUCK Bunch CNP, 2 mg at 04/11/21 0959    prazosin (MINIPRESS) capsule 2 mg, 2 mg, Oral, Nightly, CHUCK Bunch - CNP, 2 mg at 04/10/21 2236    naltrexone (DEPADE) tablet 50 mg, 50 mg, Oral, Daily with breakfast, CHUCK Bunch - CNP, 50 mg at 04/11/21 0959    metFORMIN (GLUCOPHAGE) tablet 1,000 mg, 1,000 mg, Oral, Daily with breakfast, CHUCK Bunch - CNP, 2,159 mg at 04/11/21 0959    albuterol (PROVENTIL) nebulizer solution 2.5 mg, 2.5 mg, Nebulization, S2T PRN, CHUCK Bunch CNP    budesonide (PULMICORT) nebulizer suspension 250 mcg, 0.25 mg, Nebulization, BID, 250 mcg at 04/10/21 2138 **AND** Arformoterol Tartrate (BROVANA) nebulizer solution 15 mcg, 15 mcg, Nebulization, BID, CHUCK Rojas CNP, 15 mcg at 04/10/21 2138      Examination:  BP (!) 98/55   Pulse 59   Temp 97.8 °F (36.6 °C)   Resp 14   Ht 5' 7\" (1.702 m)   Wt 208 lb (94.3 kg)   SpO2 98%   BMI 32.58 kg/m²   Gait - steady  Medication side effects(SE): None reported    Mental Status Examination:    Level of consciousness:  within normal limits   Appearance:  poor grooming and poor hygiene  Behavior/Motor:  agitated  Attitude toward examiner:  guarded and withdrawn  Speech:  normal rate and normal volume   Mood: depressed  Affect:  mood congruent and blunted  Thought processes:  goal directed   Thought content: The patient is devoid of suicidal or homicidal ideation intent or plan. Devoid of auditory or visual hallucinations or other perceptual disturbances, there are no overt or covert signs of psychosis or paranoia. Cognition:  oriented to person, place, and time   Concentration poor  Insight poor   Judgement poor     ASSESSMENT:   Patient symptoms are:  [] Well controlled  [] Improving  [] Worsening  [x] No change      Diagnosis:   Principal Problem:    Severe episode of recurrent major depressive disorder, without psychotic features ([facility] [address]  Active Problems:    Alcohol abuse    PTSD (post-traumatic stress disorder)  Resolved Problems:    Depression with suicidal ideation      LABS:    No results for input(s): WBC, HGB, PLT in the last 72 hours. No results for input(s): NA, K, CL, CO2, BUN, CREATININE, GLUCOSE in the last 72 hours. No results for input(s): BILITOT, ALKPHOS, AST, ALT in the last 72 hours. Lab Results   Component Value Date    LABAMPH NOT DETECTED 04/06/2021    BARBSCNU POSITIVE 04/06/2021    LABBENZ POSITIVE 04/06/2021    LABMETH NOT DETECTED 04/06/2021    OPIATESCREENURINE NOT DETECTED 04/06/2021    PHENCYCLIDINESCREENURINE NOT DETECTED 04/06/2021    ETOH <10 04/06/2021     Lab Results   Component Value Date    TSH 2.190 03/25/2020     No results found for: LITHIUM  Lab Results   Component Value Date    CBMZ 3.7 (L) 02/18/2016         Treatment Plan:  The patient's diagnosis, treatment plan, medication management were formulated after patient was seen directly by the attending physician and myself and all relevant documentation was reviewed. Reviewed current Medications with the patient.   Risk, benefit, side effects, possible outcomes of the medication and alternatives discussed with the patient and the

## 2021-04-11 NOTE — PROGRESS NOTES
Pt told LPN that he is supposed to take Metformin BID.  Per home med list that was confirmed by RN with [facility] Pharmacist, pt is ordered Metformin 1000 QD with breakfast.

## 2021-04-11 NOTE — PLAN OF CARE
Patient is isolative and continues to present with depression, he notes improving, and anxiety, he notes he doesn't like being around people. Patient denies SI, HI, and AVH, he denies any intent to harm self. Patient insight and judgement are poor but improving. He is medication compliant. Will monitor closely.

## 2021-04-12 VITALS
BODY MASS INDEX: 32.65 KG/M2 | WEIGHT: 208 LBS | TEMPERATURE: 96.9 F | RESPIRATION RATE: 15 BRPM | DIASTOLIC BLOOD PRESSURE: 75 MMHG | HEART RATE: 77 BPM | OXYGEN SATURATION: 98 % | SYSTOLIC BLOOD PRESSURE: 122 MMHG | HEIGHT: 67 IN

## 2021-04-12 PROCEDURE — 6370000000 HC RX 637 (ALT 250 FOR IP): Performed by: NURSE PRACTITIONER

## 2021-04-12 PROCEDURE — 99239 HOSP IP/OBS DSCHRG MGMT >30: CPT | Performed by: NURSE PRACTITIONER

## 2021-04-12 RX ORDER — PRAZOSIN HYDROCHLORIDE 2 MG/1
2 CAPSULE ORAL NIGHTLY
Qty: 30 CAPSULE | Refills: 0 | Status: ON HOLD | OUTPATIENT
Start: 2021-04-12 | End: 2022-01-01 | Stop reason: HOSPADM

## 2021-04-12 RX ORDER — NALTREXONE HYDROCHLORIDE 50 MG/1
50 TABLET, FILM COATED ORAL
Qty: 30 TABLET | Refills: 0 | Status: SHIPPED | OUTPATIENT
Start: 2021-04-13 | End: 2021-05-13

## 2021-04-12 RX ORDER — SERTRALINE HYDROCHLORIDE 25 MG/1
75 TABLET, FILM COATED ORAL DAILY
Qty: 90 TABLET | Refills: 0 | Status: SHIPPED | OUTPATIENT
Start: 2021-04-13 | End: 2021-05-13

## 2021-04-12 RX ORDER — POLYETHYLENE GLYCOL 3350 17 G
2 POWDER IN PACKET (EA) ORAL PRN
Qty: 100 EACH | Refills: 3
Start: 2021-04-12 | End: 2021-05-12

## 2021-04-12 RX ADMIN — METFORMIN HYDROCHLORIDE 1000 MG: 1000 TABLET ORAL at 09:44

## 2021-04-12 RX ADMIN — NICOTINE POLACRILEX 2 MG: 2 LOZENGE ORAL at 08:24

## 2021-04-12 RX ADMIN — NALTREXONE HYDROCHLORIDE 50 MG: 50 TABLET, FILM COATED ORAL at 09:44

## 2021-04-12 RX ADMIN — SERTRALINE 75 MG: 50 TABLET, FILM COATED ORAL at 09:44

## 2021-04-12 NOTE — PROGRESS NOTES
Pt alert, calm, and cooperative. Reading in his room. Pt denies SI, HI, and AVH. Remains flat but has good eye contact. States his depression  is improving and \"as long as I stay in my room my anxiety is good. Its rough out there sometimes\". Pt is hoping for discharge in the morning to home with his Mom. States meds are working well. Denies any needs. Declined snack. Will continue to monitor.

## 2021-04-12 NOTE — CARE COORDINATION
[facility] would not schedule pt for an intake apt until new pt paperwork is completed and faxed back to them at [phone]. Printed papers and handed them to pt in his room. Electronically signed by EDVIN Vargas LSW on 4/8/2021 at 12:39 PM       Faxed completed paperwork to [facility], waiting call back to schedule.        Electronically signed by EDVIN Vargas LSW on 4/8/2021 at 1:06 PM
In order to ensure appropriate transition and discharge planning is in place, the following documents have been transmitted to [facility] as the new outpatient provider:     The d/c diagnosis was transmitted to the next care provider   The reason for hospitalization was transmitted to the next care provider   The d/c medications (dosage and indication) were transmitted to the next care provider    The continuing care plan was transmitted to the next care provider
JEREL spoke with pt's mother Rayshawn Pan [phone]. Heather requesting information on the pt's discharge date. Heather appeared irritable and repeatedly stated \" he does not need to be there. I need to speak to someone who could give me answers\". JEREL stated a message to [facility] would be sent . JEREL proceeded to notify [facility].
SW met with pt briefly to discuss d/c plans. Pt was irritable and fixated on discharge. Pt stated that he plans to stay in his room on the unit until his insurance payment runs out so he can be discharged from our facility. Pt reported that he wants to follow with [facility] and can attend apts around his work schedule. He also stated that he plans to return home where he lives alone upon d/c or stay with his mother if needed. JEREL spoke with pt's mother Adelfo Hayden [phone]. Adelfo Hayden reported that she is very upset that pt was pinkslipped by [place]. She stated that she is aware that pt is struggling and needs to learn coping skills to deal with his issues. Adelfo Hayden reported that she knows her son and that he would never harm anyone and she does not believe that he would actually act on any suicidal thoughts. She is open to pt staying with her upon d/c if needed and appeared very supportive to pt.  Discussed follow-up at [facility]..       Electronically signed by EDVIN Mckeon, KATHLEEN on 4/8/2021 at 10:40 AM
SW spoke with pt regarding discharge. Previously pt stated he would need transported to his car at [place].  Pt reported his mother picked up his car and plans to have his mother transport him from hospital.     Electronically signed by EDVIN Cummings, KATHLEEN on 4/12/2021 at 11:12 AM
per Collateral Contact: [] Reports [] Denies       Follow up provider preference:pt lives in [facility]. He wants to find a psychologist and psychiatrist that he can trust. He works 8 to 5 M-F but states he could leave work early or take time to do a phone session. He could be agreeable to either [facility] or [email or web address].       Plan for discharge  Location (where do they plan on discharging to?): return home to his home. Transportation (who will pick them up at discharge?) my car is at [place] so will need to get back to [place] to get car. Medications (will they have money for copays at discharge?): pt states he has money he can pay for med copays.

## 2021-04-12 NOTE — PROGRESS NOTES
[id number] [facility]  Discharge Note    Pt discharged with followings belongings:   Dentures: None  Vision - Corrective Lenses: None  Hearing Aid: None  Jewelry: Other (Comment)(lip ring)  Body Piercings Removed: No  Clothing: Footwear, Jacket / coat, Pants, Shirt, Socks, Undergarments (Comment)  Were All Patient Medications Collected?: Not Applicable  Other Valuables: Money (Comment), Other (Comment)($53, N95, lighter)   Valuables sent home with pt. Valuables retrieved from safe, Security envelope number:  [id number] and returned to patient. Patient education on aftercare instructions: yes Patient verbalize understanding of AVS:  yes.     Status EXAM upon discharge:  Status and Exam  Normal: No  Facial Expression: Flat  Affect: Appropriate  Level of Consciousness: Alert  Mood:Normal: Yes  Mood: Depressed, Sad  Motor Activity:Normal: Yes  Motor Activity: Decreased  Interview Behavior: Cooperative  Preception: [place]elje to Person, Gooden Ditto to Time, Rapelje to Place, [place]je to Situation  Attention:Normal: Yes  Attention: Distractible  Thought Processes: Circumstantial  Thought Content:Normal: Yes  Hallucinations: None  Delusions: No  Memory:Normal: Yes  Insight and Judgment: No  Insight and Judgment: Poor Judgment, Poor Insight  Present Suicidal Ideation: No  Present Homicidal Ideation: No      Metabolic Screening:    Lab Results   Component Value Date    LABA1C 6.9 (H) 04/08/2021       Lab Results   Component Value Date    CHOL 174 03/25/2020     Lab Results   Component Value Date    TRIG 271 (H) 03/25/2020    TRIG 216 (H) 02/12/2016    TRIG 298 (H) 02/11/2016    TRIG 377 (H) 02/10/2016    TRIG 360 (H) 02/09/2016    TRIG 673 (H) 02/08/2016     Lab Results   Component Value Date    HDL 25 04/08/2021    HDL 28 03/25/2020     No components found for: [facility] [place]  Lab Results   Component Value Date    LABVLDL 24 04/08/2021    LABVLDL 54 03/25/2020       Narda Lechuga RN

## 2021-04-12 NOTE — PROGRESS NOTES
[facility] Interdisciplinary Treatment Plan Note     Review Date & Time: 04/12/2021 0900    Patient was in treatment team.    Admission Type:   Admission Type: Involuntary    Reason for admission:  Reason for Admission: \"I was admitted against my will because I was too open in a heightened emotional state\"    Estimated Length of Stay Update:  0-1 day  Estimated Discharge Date Update: 0-1 day    PATIENT STRENGTHS:  Patient Strengths:Strengths: Connection to output provider, Medication Compliance  Patient Strengths and Limitations:Limitations: Multiple barriers to leisure interests, Inappropriate/potentially harmful leisure interests, Difficulty problem solving/relies on others to help solve problems  Addictive Behavior:Addictive Behavior  In the past 3 months, have you felt or has someone told you that you have a problem with:  : None  Do you have a history of Chemical Use?: No  Do you have a history of Alcohol Use?: Yes  Do you have a history of Street Drug Abuse?: Yes  Histroy of Prescripton Drug Abuse?: No  Medical Problems:   Past Medical History:   Diagnosis Date    Alcohol abuse     Allergic rhinitis 3/9/2015    Anxiety     Asthma     B[facility] 3/9/2015    Depression     Generalized anxiety disorder 3/4/2016    With agoraphobia    High anion gap metabolic acidosis 2/97/8966    History of tobacco abuse 2/25/2016    Plantar fasciitis of left foot 3/3/2016    Prolonged INR 2/19/2016    Splenomegaly 3/4/2016     3/2016       Risk:  Fall RiskTotal: 53  Manoj Scale Manoj Scale Score: 22  BVC Total: 0  Change in scores no.  Changes to plan of Care none    Status EXAM:   Status and Exam  Normal: No  Facial Expression: Sad, Flat  Affect: Congruent  Level of Consciousness: Alert  Mood:Normal: No  Mood: Depressed, Sad  Motor Activity:Normal: No  Motor Activity: Decreased  Interview Behavior: Cooperative  Preception: [place] to Person, [place] to Time, [place] to Place, [place] to Situation  Attention:Normal: Yes  Attention: Distractible  Thought Processes: Circumstantial  Thought Content:Normal: Yes  Hallucinations: None  Delusions: No  Memory:Normal: Yes  Insight and Judgment: No  Insight and Judgment: Poor Judgment, Poor Insight  Present Suicidal Ideation: No  Present Homicidal Ideation: No    Daily Assessment Last Entry:   Daily Sleep (WDL): Within Defined Limits         Patient Currently in Pain: Denies  Daily Nutrition (WDL): Within Defined Limits  Appetite Change: Normal for patient  Barriers to Nutrition: None  Level of Assistance: Independent/Self    Patient Monitoring:  Frequency of Checks: 4 times per hour, close    Psychiatric Symptoms:   Depression Symptoms  Depression Symptoms: Isolative  Anxiety Symptoms  Anxiety Symptoms: Generalized  Domitila Symptoms  Domitila Symptoms: No problems reported or observed. Psychosis Symptoms  Delusion Type: No problems reported or observed.     Suicide Risk CSSR-S:  1) Within the past month, have you wished you were dead or wished you could go to sleep and not wake up? : Yes  2) Have you actually had any thoughts of killing yourself? : No  6) Have you ever done anything, started to do anything, or prepared to do anything to end your life?: Yes  Change in Result no Change in Plan of care none    EDUCATION:   Learner Progress Toward Treatment Goals: progressing    Method: follow care plan, attend groups    Outcome: meet goals and return home    PATIENT GOALS: \"continue to go with the flow\"    PLAN/TREATMENT RECOMMENDATIONS UPDATE: continue present care plan    GOALS UPDATE:  Time frame for Short-Term Goals: 0-1 day      Narda Lechuga RN

## 2021-04-12 NOTE — DISCHARGE SUMMARY
resource strain: Somewhat hard    Food insecurity     Worry: Never true     Inability: Never true    Transportation needs     Medical: No     Non-medical: No   Tobacco Use    Smoking status: Current Every Day Smoker     Packs/day: 0.25     Years: 11.00     Pack years: 2.75     Types: Cigarettes    Smokeless tobacco: Former User   Substance and Sexual Activity    Alcohol use: Yes     Alcohol/week: 40.0 standard drinks     Types: 40 Shots of liquor per week     Comment: 1 LITER OF 80PROOF VODKA DAILY    Drug use: Yes     Frequency: 1.0 times per week     Types: Marijuana     Comment: once a week while drinking    Sexual activity: Not Currently   Lifestyle    Physical activity     Days per week: Not on file     Minutes per session: Not on file    Stress: Not on file   Relationships    Social connections     Talks on phone: Not on file     Gets together: Not on file     Attends Congregational service: Not on file     Active member of club or organization: Not on file     Attends meetings of clubs or organizations: Not on file     Relationship status: Not on file    Intimate partner violence     Fear of current or ex partner: Not on file     Emotionally abused: Not on file     Physically abused: Not on file     Forced sexual activity: Not on file   Other Topics Concern    Not on file   Social History Narrative    Not on file       MEDICATIONS:  No current facility-administered medications for this encounter.      Current Outpatient Medications:     [START ON 4/13/2021] naltrexone (DEPADE) 50 MG tablet, Take 1 tablet by mouth daily (with breakfast), Disp: 30 tablet, Rfl: 0    nicotine polacrilex (COMMIT) 2 MG lozenge, Take 1 lozenge by mouth as needed for Smoking cessation, Disp: 100 each, Rfl: 3    prazosin (MINIPRESS) 2 MG capsule, Take 1 capsule by mouth nightly, Disp: 30 capsule, Rfl: 0    [START ON 4/13/2021] sertraline (ZOLOFT) 25 MG tablet, Take 3 tablets by mouth daily, Disp: 90 tablet, Rfl: 0   homicidal ideations intent or plan. He was eating well and sleeping well there are no neurovegetative signs or symptoms of depression he denied any auditory or visual hallucinations. There are no overt or covert signs of psychosis. He was appreciative of the help that he received here. This patient no longer meets criteria for inpatient hospitalization. No AVH or paranoid thoughts  No hopeless or worthless feeling  No active SI/HI  Appetite:  [x] Normal  [] Increased  [] Decreased    Sleep:       [x] Normal  [] Fair       [] Poor            Energy:    [x] Normal  [] Increased  [] Decreased     SI [] Present  [x] Absent  HI  []Present  [x] Absent   Aggression:  [] yes  [x] no  Patient is [x] able  [] unable to CONTRACT FOR SAFETY   Medication side effects(SE):  [x] None(Psych. Meds.) [] Other      Mental Status Examination on discharge:    Level of consciousness:  within normal limits   Appearance:  well-appearing  Behavior/Motor:  no abnormalities noted  Attitude toward examiner:  attentive and good eye contact  Speech:  spontaneous, normal rate and normal volume   Mood: \" My mood is much better. \"  Affect: Bright appropriate pleasant  Thought processes: Linear without flight of ideas loose associations  Thought content: Avoid any auditory visual hallucinations delusions or perceptual rise.   Denies SI/HI intent or plan  Cognition:  oriented to person, place, and time   Concentration intact  Memory intact  Insight good   Judgement fair   Fund of Knowledge adequate      ASSESSMENT:  Patient symptoms are:  [x] Well controlled  [x] Improving  [] Worsening  [] No change    Reason for more than one antipsychotic:  [x] N/A  [] 3 Failed Monotherapy attempts (Drugs tried:)  [] Crossover to a new antipsychotic  [] Taper to Monotherapy from Polypharmacy  [] Augmentation of clozapine therapy due to treatment resistance to single therapy    Diagnosis:  Principal Problem:    Severe episode of recurrent major depressive disorder, without psychotic features ([facility]  Active Problems:    Alcohol abuse    PTSD (post-traumatic stress disorder)  Resolved Problems:    Depression with suicidal ideation      LABS:    No results for input(s): WBC, HGB, PLT in the last 72 hours. No results for input(s): NA, K, CL, CO2, BUN, CREATININE, GLUCOSE in the last 72 hours. No results for input(s): BILITOT, ALKPHOS, AST, ALT in the last 72 hours. Lab Results   Component Value Date    LABAMPH NOT DETECTED 04/06/2021    BARBSCNU POSITIVE 04/06/2021    LABBENZ POSITIVE 04/06/2021    LABMETH NOT DETECTED 04/06/2021    OPIATESCREENURINE NOT DETECTED 04/06/2021    PHENCYCLIDINESCREENURINE NOT DETECTED 04/06/2021    ETOH <10 04/06/2021     Lab Results   Component Value Date    TSH 2.190 03/25/2020     No results found for: LITHIUM  Lab Results   Component Value Date    CBMZ 3.7 (L) 02/18/2016       RISK ASSESSMENT AT DISCHARGE: Low risk for suicide and homicide. Treatment Plan:  Reviewed current Medications with the patient. Education provided on the complaince with treatment. Risks, benefits, side effects, drug-to-drug interactions and alternatives to treatment were discussed. Encourage patient to attend outpatient follow up appointment and therapy. Patient was advised to call the outpatient provider, visit the nearest ED or call 911 if symptoms are not manageable. Patient's family member was contacted prior to the discharge.          Medication List      START taking these medications    naltrexone 50 MG tablet  Commonly known as: DEPADE  Take 1 tablet by mouth daily (with breakfast)  Start taking on: April 13, 2021     nicotine polacrilex 2 MG lozenge  Commonly known as: COMMIT  Take 1 lozenge by mouth as needed for Smoking cessation     prazosin 2 MG capsule  Commonly known as: MINIPRESS  Take 1 capsule by mouth nightly     sertraline 25 MG tablet  Commonly known as: ZOLOFT  Take 3 tablets by mouth daily  Start taking on: April 13, 2021        CHANGE how you take these medications    vitamin D 1.25 MG (02400 UT) Caps capsule  Commonly known as: ERGOCALCIFEROL  Take 1 capsule by mouth once a week  What changed: additional instructions        CONTINUE taking these medications    albuterol sulfate  (90 Base) MCG/ACT inhaler  Commonly known as: Ventolin HFA  Inhale 2 puffs into the lungs every 4 hours as needed for Wheezing     budesonide-formoterol 80-4.5 MCG/ACT Aero  Commonly known as: SYMBICORT  Inhale 2 puffs into the lungs 2 times daily     metFORMIN 1000 MG tablet  Commonly known as: GLUCOPHAGE  Take 1 tablet by mouth daily (with breakfast)        STOP taking these medications    ATARAX PO     blood glucose test strips           Where to Get Your Medications      These medications were sent to [facility] [address] [id number]    Phone: [phone]   · naltrexone 50 MG tablet  · prazosin 2 MG capsule  · sertraline 25 MG tablet     Information about where to get these medications is not yet available    Ask your nurse or doctor about these medications  · nicotine polacrilex 2 MG lozenge       Patient is counseled if he continues to abuse drugs or alcohol he could act out impulsively causing serious harm to himself or others even though may be unintentional.  He demonstrated understanding of this and has the capacity understand this    Patient is counseled hisr mental health treatment will be difficult to optimize with ongoing use of drugs or alcohol he demonstrate understanding of this as the past understand this     Patient is counseled that he he uses any amount of opiates after any clean time he could have an unintentional overdose he demonstrated understanding of this and has the capacity to understand this    Patient is counseled he must remain compliant with all medications outpatient follow-up ointments    Patient is discharged home in stable condition    TIME SPEND - 35 MINUTES TO COMPLETE THE EVALUATION, DISCHARGE SUMMARY, MEDICATION RECONCILIATION AND FOLLOW UP CARE     Signed:  Gurdeep Laguerre  0/07/0028  7:79 PM

## 2021-07-26 NOTE — ED NOTES
"I did not personally see the patient.  I reviewed and agree with the assessment and plan as documented in this note.     Annel \"Jesse\" KINA Ramos, Ph.D., Professor  [place] Licensed Psychologist  [place] Licensed Marriage & Family Therapist & State Approved Supervisor  "
I made one attempt atsecondary IV start to the ZAHRA above a previous insertion site. Received a flash and fill of cassette but the catheter would not advance. Dr Garrett Salgado notified. [facility] intensive on site and they made two attempts to bilateral wrists without success. Dr Lisa Andrew gave verbal permission to [facility] to take the pt without a secondary IV. Only sodium bicarb infusion is infusing at this time. [facility] aware of insulin infusion order but no secondary line available. Vital signs are stable at this time. POC glucose at 1630 pm is 221.       [facility], [address] [place] [address]  03/12/20 [id number]
Pt requesting to leave , dr Santos villanueva in to see pt and spoke with him about test results and informed pt of need to be admitted.      Delaney Michaels RN  03/12/20 [id number]
same name as above

## 2022-01-01 ENCOUNTER — TELEPHONE (OUTPATIENT)
Dept: FAMILY MEDICINE CLINIC | Age: 32
End: 2022-01-01

## 2022-01-01 ENCOUNTER — APPOINTMENT (OUTPATIENT)
Dept: CT IMAGING | Age: 32
DRG: 638 | End: 2022-01-01
Payer: COMMERCIAL

## 2022-01-01 ENCOUNTER — OFFICE VISIT (OUTPATIENT)
Dept: ENDOCRINOLOGY | Age: 32
End: 2022-01-01
Payer: COMMERCIAL

## 2022-01-01 ENCOUNTER — CARE COORDINATION (OUTPATIENT)
Dept: CARE COORDINATION | Age: 32
End: 2022-01-01

## 2022-01-01 ENCOUNTER — HOSPITAL ENCOUNTER (INPATIENT)
Age: 32
LOS: 3 days | Discharge: HOME | DRG: 638 | End: 2022-04-05
Attending: EMERGENCY MEDICINE | Admitting: INTERNAL MEDICINE
Payer: COMMERCIAL

## 2022-01-01 ENCOUNTER — APPOINTMENT (OUTPATIENT)
Dept: GENERAL RADIOLOGY | Age: 32
DRG: 638 | End: 2022-01-01
Payer: COMMERCIAL

## 2022-01-01 ENCOUNTER — APPOINTMENT (OUTPATIENT)
Dept: ULTRASOUND IMAGING | Age: 32
DRG: 638 | End: 2022-01-01
Payer: COMMERCIAL

## 2022-01-01 ENCOUNTER — TELEPHONE (OUTPATIENT)
Dept: GASTROENTEROLOGY | Age: 32
End: 2022-01-01

## 2022-01-01 ENCOUNTER — HOSPITAL ENCOUNTER (EMERGENCY)
Age: 32
End: 2022-06-29
Attending: EMERGENCY MEDICINE
Payer: COMMERCIAL

## 2022-01-01 VITALS
HEIGHT: 67 IN | OXYGEN SATURATION: 98 % | SYSTOLIC BLOOD PRESSURE: 146 MMHG | DIASTOLIC BLOOD PRESSURE: 85 MMHG | BODY MASS INDEX: 29.98 KG/M2 | HEART RATE: 105 BPM | WEIGHT: 191 LBS

## 2022-01-01 VITALS
HEART RATE: 90 BPM | DIASTOLIC BLOOD PRESSURE: 69 MMHG | SYSTOLIC BLOOD PRESSURE: 105 MMHG | WEIGHT: 180.78 LBS | TEMPERATURE: 97.5 F | OXYGEN SATURATION: 100 % | HEIGHT: 67 IN | BODY MASS INDEX: 28.37 KG/M2 | RESPIRATION RATE: 16 BRPM

## 2022-01-01 VITALS — WEIGHT: 200 LBS | BODY MASS INDEX: 31.32 KG/M2

## 2022-01-01 DIAGNOSIS — E08.10 DIABETIC KETOACIDOSIS WITHOUT COMA ASSOCIATED WITH DIABETES MELLITUS DUE TO UNDERLYING CONDITION (HCC): Primary | ICD-10-CM

## 2022-01-01 DIAGNOSIS — I26.99 ACUTE PULMONARY EMBOLISM WITHOUT ACUTE COR PULMONALE, UNSPECIFIED PULMONARY EMBOLISM TYPE (HCC): ICD-10-CM

## 2022-01-01 DIAGNOSIS — Z91.119 DIETARY NONCOMPLIANCE: ICD-10-CM

## 2022-01-01 DIAGNOSIS — E55.9 VITAMIN D DEFICIENCY: ICD-10-CM

## 2022-01-01 DIAGNOSIS — K86.89 PANCREATIC MASS: ICD-10-CM

## 2022-01-01 DIAGNOSIS — I46.9 CARDIAC ARREST (HCC): Primary | ICD-10-CM

## 2022-01-01 DIAGNOSIS — K85.20 ALCOHOL-INDUCED ACUTE PANCREATITIS WITHOUT INFECTION OR NECROSIS: ICD-10-CM

## 2022-01-01 DIAGNOSIS — R16.1 SPLENOMEGALY: ICD-10-CM

## 2022-01-01 DIAGNOSIS — E11.65 POORLY CONTROLLED DIABETES MELLITUS (HCC): Primary | ICD-10-CM

## 2022-01-01 LAB
ACETAMINOPHEN LEVEL: <5 MCG/ML (ref 10–30)
ALBUMIN SERPL-MCNC: 3.5 G/DL (ref 3.5–5.2)
ALBUMIN SERPL-MCNC: 3.9 G/DL (ref 3.5–5.2)
ALBUMIN SERPL-MCNC: 4.7 G/DL (ref 3.5–5.2)
ALP BLD-CCNC: 110 U/L (ref 40–129)
ALP BLD-CCNC: 74 U/L (ref 40–129)
ALP BLD-CCNC: 79 U/L (ref 40–129)
ALT SERPL-CCNC: 48 U/L (ref 0–40)
ALT SERPL-CCNC: 63 U/L (ref 0–40)
ALT SERPL-CCNC: 81 U/L (ref 0–40)
ANION GAP SERPL CALCULATED.3IONS-SCNC: 12 MMOL/L (ref 7–16)
ANION GAP SERPL CALCULATED.3IONS-SCNC: 13 MMOL/L (ref 7–16)
ANION GAP SERPL CALCULATED.3IONS-SCNC: 19 MMOL/L (ref 7–16)
ANION GAP SERPL CALCULATED.3IONS-SCNC: 20 MMOL/L (ref 7–16)
ANION GAP SERPL CALCULATED.3IONS-SCNC: 26 MMOL/L (ref 7–16)
ANION GAP SERPL CALCULATED.3IONS-SCNC: 28 MMOL/L (ref 7–16)
ANION GAP SERPL CALCULATED.3IONS-SCNC: 9 MMOL/L (ref 7–16)
ANION GAP SERPL CALCULATED.3IONS-SCNC: 9 MMOL/L (ref 7–16)
AST SERPL-CCNC: 32 U/L (ref 0–39)
AST SERPL-CCNC: 49 U/L (ref 0–39)
AST SERPL-CCNC: 69 U/L (ref 0–39)
BACTERIA: ABNORMAL /HPF
BASOPHILS ABSOLUTE: 0.01 E9/L (ref 0–0.2)
BASOPHILS ABSOLUTE: 0.02 E9/L (ref 0–0.2)
BASOPHILS ABSOLUTE: 0.02 E9/L (ref 0–0.2)
BASOPHILS ABSOLUTE: 0.05 E9/L (ref 0–0.2)
BASOPHILS RELATIVE PERCENT: 0.5 % (ref 0–2)
BASOPHILS RELATIVE PERCENT: 0.9 % (ref 0–2)
BASOPHILS RELATIVE PERCENT: 1.1 % (ref 0–2)
BASOPHILS RELATIVE PERCENT: 1.3 % (ref 0–2)
BETA-HYDROXYBUTYRATE: >4.5 MMOL/L (ref 0.02–0.27)
BILIRUB SERPL-MCNC: 0.4 MG/DL (ref 0–1.2)
BILIRUB SERPL-MCNC: 0.4 MG/DL (ref 0–1.2)
BILIRUB SERPL-MCNC: 0.6 MG/DL (ref 0–1.2)
BILIRUBIN DIRECT: <0.2 MG/DL (ref 0–0.3)
BILIRUBIN URINE: ABNORMAL
BILIRUBIN, INDIRECT: ABNORMAL MG/DL (ref 0–1)
BLOOD, URINE: NEGATIVE
BUN BLDV-MCNC: 2 MG/DL (ref 6–20)
BUN BLDV-MCNC: 3 MG/DL (ref 6–20)
BUN BLDV-MCNC: 4 MG/DL (ref 6–20)
BUN BLDV-MCNC: 5 MG/DL (ref 6–20)
BUN BLDV-MCNC: 7 MG/DL (ref 6–20)
BUN BLDV-MCNC: <2 MG/DL (ref 6–20)
C-PEPTIDE: 0.4 NG/ML (ref 0.5–3.3)
CA 19-9: 12 U/ML
CALCIUM SERPL-MCNC: 7.3 MG/DL (ref 8.6–10.2)
CALCIUM SERPL-MCNC: 7.5 MG/DL (ref 8.6–10.2)
CALCIUM SERPL-MCNC: 7.7 MG/DL (ref 8.6–10.2)
CALCIUM SERPL-MCNC: 7.8 MG/DL (ref 8.6–10.2)
CALCIUM SERPL-MCNC: 8 MG/DL (ref 8.6–10.2)
CALCIUM SERPL-MCNC: 8.9 MG/DL (ref 8.6–10.2)
CALCIUM SERPL-MCNC: 8.9 MG/DL (ref 8.6–10.2)
CALCIUM SERPL-MCNC: 9.2 MG/DL (ref 8.6–10.2)
CHLORIDE BLD-SCNC: 100 MMOL/L (ref 98–107)
CHLORIDE BLD-SCNC: 100 MMOL/L (ref 98–107)
CHLORIDE BLD-SCNC: 103 MMOL/L (ref 98–107)
CHLORIDE BLD-SCNC: 106 MMOL/L (ref 98–107)
CHLORIDE BLD-SCNC: 92 MMOL/L (ref 98–107)
CHLORIDE BLD-SCNC: 97 MMOL/L (ref 98–107)
CHLORIDE BLD-SCNC: 98 MMOL/L (ref 98–107)
CHLORIDE BLD-SCNC: 99 MMOL/L (ref 98–107)
CHOLESTEROL, FASTING: 177 MG/DL (ref 0–199)
CHP ED QC CHECK: NORMAL
CLARITY: CLEAR
CO2: 10 MMOL/L (ref 22–29)
CO2: 11 MMOL/L (ref 22–29)
CO2: 13 MMOL/L (ref 22–29)
CO2: 14 MMOL/L (ref 22–29)
CO2: 20 MMOL/L (ref 22–29)
CO2: 21 MMOL/L (ref 22–29)
CO2: 27 MMOL/L (ref 22–29)
CO2: 29 MMOL/L (ref 22–29)
COLOR: YELLOW
CREAT SERPL-MCNC: 0.5 MG/DL (ref 0.7–1.2)
CREAT SERPL-MCNC: 0.6 MG/DL (ref 0.7–1.2)
CREAT SERPL-MCNC: 0.7 MG/DL (ref 0.7–1.2)
CRYSTALS, UA: ABNORMAL /HPF
D DIMER: 242 NG/ML DDU
D DIMER: <200 NG/ML DDU
EKG ATRIAL RATE: 103 BPM
EKG P AXIS: 65 DEGREES
EKG P-R INTERVAL: 132 MS
EKG Q-T INTERVAL: 368 MS
EKG QRS DURATION: 86 MS
EKG QTC CALCULATION (BAZETT): 482 MS
EKG R AXIS: 61 DEGREES
EKG T AXIS: 51 DEGREES
EKG VENTRICULAR RATE: 103 BPM
EOSINOPHILS ABSOLUTE: 0.06 E9/L (ref 0.05–0.5)
EOSINOPHILS ABSOLUTE: 0.07 E9/L (ref 0.05–0.5)
EOSINOPHILS ABSOLUTE: 0.07 E9/L (ref 0.05–0.5)
EOSINOPHILS ABSOLUTE: 0.1 E9/L (ref 0.05–0.5)
EOSINOPHILS RELATIVE PERCENT: 1.8 % (ref 0–6)
EOSINOPHILS RELATIVE PERCENT: 3.2 % (ref 0–6)
EOSINOPHILS RELATIVE PERCENT: 3.6 % (ref 0–6)
EOSINOPHILS RELATIVE PERCENT: 4.6 % (ref 0–6)
ETHANOL: <10 MG/DL (ref 0–0.08)
GFR AFRICAN AMERICAN: >60
GFR NON-AFRICAN AMERICAN: >60 ML/MIN/1.73
GLUCOSE BLD-MCNC: 104 MG/DL (ref 74–99)
GLUCOSE BLD-MCNC: 119 MG/DL (ref 74–99)
GLUCOSE BLD-MCNC: 152 MG/DL
GLUCOSE BLD-MCNC: 170 MG/DL (ref 74–99)
GLUCOSE BLD-MCNC: 175 MG/DL (ref 74–99)
GLUCOSE BLD-MCNC: 203 MG/DL (ref 74–99)
GLUCOSE BLD-MCNC: 216 MG/DL
GLUCOSE BLD-MCNC: 221 MG/DL (ref 74–99)
GLUCOSE BLD-MCNC: 254 MG/DL (ref 74–99)
GLUCOSE BLD-MCNC: 256 MG/DL (ref 74–99)
GLUCOSE URINE: 500 MG/DL
HAV IGM SER IA-ACNC: NORMAL
HBA1C MFR BLD: 9.2 %
HCT VFR BLD CALC: 34.3 % (ref 37–54)
HCT VFR BLD CALC: 38 % (ref 37–54)
HCT VFR BLD CALC: 38.3 % (ref 37–54)
HCT VFR BLD CALC: 42.3 % (ref 37–54)
HDLC SERPL-MCNC: 32 MG/DL
HEMOGLOBIN: 12.8 G/DL (ref 12.5–16.5)
HEMOGLOBIN: 13.8 G/DL (ref 12.5–16.5)
HEMOGLOBIN: 14.1 G/DL (ref 12.5–16.5)
HEMOGLOBIN: 16 G/DL (ref 12.5–16.5)
HEPATITIS B CORE IGM ANTIBODY: NORMAL
HEPATITIS B SURFACE ANTIGEN INTERPRETATION: NORMAL
HEPATITIS C ANTIBODY INTERPRETATION: NORMAL
IMMATURE GRANULOCYTES #: 0 E9/L
IMMATURE GRANULOCYTES #: 0.01 E9/L
IMMATURE GRANULOCYTES #: 0.01 E9/L
IMMATURE GRANULOCYTES #: 0.03 E9/L
IMMATURE GRANULOCYTES %: 0 % (ref 0–5)
IMMATURE GRANULOCYTES %: 0.5 % (ref 0–5)
IMMATURE GRANULOCYTES %: 0.5 % (ref 0–5)
IMMATURE GRANULOCYTES %: 0.7 % (ref 0–5)
INFLUENZA A BY PCR: NOT DETECTED
INFLUENZA B BY PCR: NOT DETECTED
INR BLD: 1.1
INSULIN: 2 UIU/ML
KETONES, URINE: >=80 MG/DL
LACTIC ACID, SEPSIS: 0.9 MMOL/L (ref 0.5–1.9)
LACTIC ACID, SEPSIS: 2.6 MMOL/L (ref 0.5–1.9)
LDL CHOLESTEROL CALCULATED: 108 MG/DL (ref 0–99)
LEUKOCYTE ESTERASE, URINE: NEGATIVE
LIPASE: 32 U/L (ref 13–60)
LIPASE: 68 U/L (ref 13–60)
LYMPHOCYTES ABSOLUTE: 0.73 E9/L (ref 1.5–4)
LYMPHOCYTES ABSOLUTE: 0.75 E9/L (ref 1.5–4)
LYMPHOCYTES ABSOLUTE: 1.01 E9/L (ref 1.5–4)
LYMPHOCYTES ABSOLUTE: 1.43 E9/L (ref 1.5–4)
LYMPHOCYTES RELATIVE PERCENT: 25.1 % (ref 20–42)
LYMPHOCYTES RELATIVE PERCENT: 37.2 % (ref 20–42)
LYMPHOCYTES RELATIVE PERCENT: 49.3 % (ref 20–42)
LYMPHOCYTES RELATIVE PERCENT: 54.3 % (ref 20–42)
MAGNESIUM: 1.6 MG/DL (ref 1.6–2.6)
MAGNESIUM: 1.7 MG/DL (ref 1.6–2.6)
MAGNESIUM: 1.7 MG/DL (ref 1.6–2.6)
MAGNESIUM: 1.8 MG/DL (ref 1.6–2.6)
MAGNESIUM: 1.9 MG/DL (ref 1.6–2.6)
MAGNESIUM: 2.1 MG/DL (ref 1.6–2.6)
MCH RBC QN AUTO: 33.5 PG (ref 26–35)
MCH RBC QN AUTO: 33.6 PG (ref 26–35)
MCH RBC QN AUTO: 33.6 PG (ref 26–35)
MCH RBC QN AUTO: 34 PG (ref 26–35)
MCHC RBC AUTO-ENTMCNC: 36.3 % (ref 32–34.5)
MCHC RBC AUTO-ENTMCNC: 36.8 % (ref 32–34.5)
MCHC RBC AUTO-ENTMCNC: 37.3 % (ref 32–34.5)
MCHC RBC AUTO-ENTMCNC: 37.8 % (ref 32–34.5)
MCV RBC AUTO: 90 FL (ref 80–99.9)
MCV RBC AUTO: 90 FL (ref 80–99.9)
MCV RBC AUTO: 91 FL (ref 80–99.9)
MCV RBC AUTO: 92.5 FL (ref 80–99.9)
METER GLUCOSE: 109 MG/DL (ref 74–99)
METER GLUCOSE: 111 MG/DL (ref 74–99)
METER GLUCOSE: 116 MG/DL (ref 74–99)
METER GLUCOSE: 120 MG/DL (ref 74–99)
METER GLUCOSE: 133 MG/DL (ref 74–99)
METER GLUCOSE: 138 MG/DL (ref 74–99)
METER GLUCOSE: 143 MG/DL (ref 74–99)
METER GLUCOSE: 145 MG/DL (ref 74–99)
METER GLUCOSE: 152 MG/DL (ref 74–99)
METER GLUCOSE: 154 MG/DL (ref 74–99)
METER GLUCOSE: 174 MG/DL (ref 74–99)
METER GLUCOSE: 175 MG/DL (ref 74–99)
METER GLUCOSE: 180 MG/DL (ref 74–99)
METER GLUCOSE: 183 MG/DL (ref 74–99)
METER GLUCOSE: 186 MG/DL (ref 74–99)
METER GLUCOSE: 189 MG/DL (ref 74–99)
METER GLUCOSE: 216 MG/DL (ref 74–99)
METER GLUCOSE: 219 MG/DL (ref 74–99)
METER GLUCOSE: 223 MG/DL (ref 74–99)
METER GLUCOSE: 225 MG/DL (ref 74–99)
METER GLUCOSE: 232 MG/DL (ref 74–99)
METER GLUCOSE: 238 MG/DL (ref 74–99)
METER GLUCOSE: 259 MG/DL (ref 74–99)
METER GLUCOSE: 295 MG/DL (ref 74–99)
METER GLUCOSE: 85 MG/DL (ref 74–99)
MONOCYTES ABSOLUTE: 0.18 E9/L (ref 0.1–0.95)
MONOCYTES ABSOLUTE: 0.19 E9/L (ref 0.1–0.95)
MONOCYTES ABSOLUTE: 0.31 E9/L (ref 0.1–0.95)
MONOCYTES ABSOLUTE: 0.65 E9/L (ref 0.1–0.95)
MONOCYTES RELATIVE PERCENT: 10.2 % (ref 2–12)
MONOCYTES RELATIVE PERCENT: 11.4 % (ref 2–12)
MONOCYTES RELATIVE PERCENT: 11.8 % (ref 2–12)
MONOCYTES RELATIVE PERCENT: 15.8 % (ref 2–12)
MRSA CULTURE ONLY: NORMAL
NEUTROPHILS ABSOLUTE: 0.49 E9/L (ref 1.8–7.3)
NEUTROPHILS ABSOLUTE: 0.58 E9/L (ref 1.8–7.3)
NEUTROPHILS ABSOLUTE: 0.83 E9/L (ref 1.8–7.3)
NEUTROPHILS ABSOLUTE: 3.43 E9/L (ref 1.8–7.3)
NEUTROPHILS RELATIVE PERCENT: 31.2 % (ref 43–80)
NEUTROPHILS RELATIVE PERCENT: 32.3 % (ref 43–80)
NEUTROPHILS RELATIVE PERCENT: 42.4 % (ref 43–80)
NEUTROPHILS RELATIVE PERCENT: 60.3 % (ref 43–80)
NITRITE, URINE: NEGATIVE
PDW BLD-RTO: 12.1 FL (ref 11.5–15)
PDW BLD-RTO: 12.4 FL (ref 11.5–15)
PDW BLD-RTO: 12.4 FL (ref 11.5–15)
PDW BLD-RTO: 12.6 FL (ref 11.5–15)
PH UA: 6 (ref 5–9)
PH VENOUS: 7.29 (ref 7.35–7.45)
PHOSPHORUS: 0.8 MG/DL (ref 2.5–4.5)
PHOSPHORUS: 2 MG/DL (ref 2.5–4.5)
PHOSPHORUS: 2 MG/DL (ref 2.5–4.5)
PHOSPHORUS: 3.4 MG/DL (ref 2.5–4.5)
PHOSPHORUS: 3.6 MG/DL (ref 2.5–4.5)
PHOSPHORUS: 4.1 MG/DL (ref 2.5–4.5)
PLATELET # BLD: 121 E9/L (ref 130–450)
PLATELET # BLD: 64 E9/L (ref 130–450)
PLATELET # BLD: 66 E9/L (ref 130–450)
PLATELET # BLD: 70 E9/L (ref 130–450)
PLATELET CONFIRMATION: NORMAL
PMV BLD AUTO: 8.7 FL (ref 7–12)
PMV BLD AUTO: 9.2 FL (ref 7–12)
PMV BLD AUTO: 9.2 FL (ref 7–12)
PMV BLD AUTO: 9.6 FL (ref 7–12)
POLYCHROMASIA: ABNORMAL
POLYCHROMASIA: ABNORMAL
POTASSIUM REFLEX MAGNESIUM: 3.4 MMOL/L (ref 3.5–5)
POTASSIUM SERPL-SCNC: 3 MMOL/L (ref 3.5–5)
POTASSIUM SERPL-SCNC: 3.1 MMOL/L (ref 3.5–5)
POTASSIUM SERPL-SCNC: 3.3 MMOL/L (ref 3.5–5)
POTASSIUM SERPL-SCNC: 3.4 MMOL/L (ref 3.5–5)
POTASSIUM SERPL-SCNC: 3.4 MMOL/L (ref 3.5–5)
POTASSIUM SERPL-SCNC: 3.5 MMOL/L (ref 3.5–5)
POTASSIUM SERPL-SCNC: 3.5 MMOL/L (ref 3.5–5)
PRO-BNP: 11 PG/ML (ref 0–125)
PROTEIN UA: 30 MG/DL
PROTHROMBIN TIME: 11.7 SEC (ref 9.3–12.4)
RBC # BLD: 3.81 E12/L (ref 3.8–5.8)
RBC # BLD: 4.11 E12/L (ref 3.8–5.8)
RBC # BLD: 4.21 E12/L (ref 3.8–5.8)
RBC # BLD: 4.7 E12/L (ref 3.8–5.8)
RBC # BLD: NORMAL 10*6/UL
RBC UA: ABNORMAL /HPF (ref 0–2)
SALICYLATE, SERUM: <0.3 MG/DL (ref 0–30)
SARS-COV-2, NAAT: NOT DETECTED
SODIUM BLD-SCNC: 131 MMOL/L (ref 132–146)
SODIUM BLD-SCNC: 131 MMOL/L (ref 132–146)
SODIUM BLD-SCNC: 132 MMOL/L (ref 132–146)
SODIUM BLD-SCNC: 133 MMOL/L (ref 132–146)
SODIUM BLD-SCNC: 134 MMOL/L (ref 132–146)
SODIUM BLD-SCNC: 138 MMOL/L (ref 132–146)
SODIUM BLD-SCNC: 138 MMOL/L (ref 132–146)
SODIUM BLD-SCNC: 139 MMOL/L (ref 132–146)
SPECIFIC GRAVITY UA: >=1.03 (ref 1–1.03)
TOTAL PROTEIN: 5.4 G/DL (ref 6.4–8.3)
TOTAL PROTEIN: 5.9 G/DL (ref 6.4–8.3)
TOTAL PROTEIN: 7.3 G/DL (ref 6.4–8.3)
TRICYCLIC ANTIDEPRESSANTS SCREEN SERUM: NEGATIVE NG/ML
TRIGLYCERIDE, FASTING: 187 MG/DL (ref 0–149)
TROPONIN, HIGH SENSITIVITY: <6 NG/L (ref 0–11)
TSH SERPL DL<=0.05 MIU/L-ACNC: 2.97 UIU/ML (ref 0.27–4.2)
UROBILINOGEN, URINE: 0.2 E.U./DL
VLDLC SERPL CALC-MCNC: 37 MG/DL
WBC # BLD: 1.5 E9/L (ref 4.5–11.5)
WBC # BLD: 1.9 E9/L (ref 4.5–11.5)
WBC # BLD: 2 E9/L (ref 4.5–11.5)
WBC # BLD: 5.7 E9/L (ref 4.5–11.5)
WBC UA: ABNORMAL /HPF (ref 0–5)

## 2022-01-01 PROCEDURE — 93970 EXTREMITY STUDY: CPT

## 2022-01-01 PROCEDURE — 6360000002 HC RX W HCPCS: Performed by: EMERGENCY MEDICINE

## 2022-01-01 PROCEDURE — 6360000002 HC RX W HCPCS: Performed by: INTERNAL MEDICINE

## 2022-01-01 PROCEDURE — 96366 THER/PROPH/DIAG IV INF ADDON: CPT

## 2022-01-01 PROCEDURE — 82962 GLUCOSE BLOOD TEST: CPT

## 2022-01-01 PROCEDURE — 2500000003 HC RX 250 WO HCPCS: Performed by: INTERNAL MEDICINE

## 2022-01-01 PROCEDURE — 6370000000 HC RX 637 (ALT 250 FOR IP): Performed by: NURSE PRACTITIONER

## 2022-01-01 PROCEDURE — 36415 COLL VENOUS BLD VENIPUNCTURE: CPT

## 2022-01-01 PROCEDURE — 80053 COMPREHEN METABOLIC PANEL: CPT

## 2022-01-01 PROCEDURE — 6370000000 HC RX 637 (ALT 250 FOR IP): Performed by: INTERNAL MEDICINE

## 2022-01-01 PROCEDURE — 85610 PROTHROMBIN TIME: CPT

## 2022-01-01 PROCEDURE — 99214 OFFICE O/P EST MOD 30 MIN: CPT | Performed by: NURSE PRACTITIONER

## 2022-01-01 PROCEDURE — 87635 SARS-COV-2 COVID-19 AMP PRB: CPT

## 2022-01-01 PROCEDURE — 94640 AIRWAY INHALATION TREATMENT: CPT

## 2022-01-01 PROCEDURE — 99283 EMERGENCY DEPT VISIT LOW MDM: CPT

## 2022-01-01 PROCEDURE — 80074 ACUTE HEPATITIS PANEL: CPT

## 2022-01-01 PROCEDURE — 86301 IMMUNOASSAY TUMOR CA 19-9: CPT

## 2022-01-01 PROCEDURE — 2000000000 HC ICU R&B

## 2022-01-01 PROCEDURE — 99232 SBSQ HOSP IP/OBS MODERATE 35: CPT | Performed by: INTERNAL MEDICINE

## 2022-01-01 PROCEDURE — 2580000003 HC RX 258: Performed by: INTERNAL MEDICINE

## 2022-01-01 PROCEDURE — 85025 COMPLETE CBC W/AUTO DIFF WBC: CPT

## 2022-01-01 PROCEDURE — 99222 1ST HOSP IP/OBS MODERATE 55: CPT | Performed by: FAMILY MEDICINE

## 2022-01-01 PROCEDURE — 82010 KETONE BODYS QUAN: CPT

## 2022-01-01 PROCEDURE — 82248 BILIRUBIN DIRECT: CPT

## 2022-01-01 PROCEDURE — 80143 DRUG ASSAY ACETAMINOPHEN: CPT

## 2022-01-01 PROCEDURE — 80307 DRUG TEST PRSMV CHEM ANLYZR: CPT

## 2022-01-01 PROCEDURE — 3046F HEMOGLOBIN A1C LEVEL >9.0%: CPT | Performed by: NURSE PRACTITIONER

## 2022-01-01 PROCEDURE — 83690 ASSAY OF LIPASE: CPT

## 2022-01-01 PROCEDURE — 83036 HEMOGLOBIN GLYCOSYLATED A1C: CPT | Performed by: NURSE PRACTITIONER

## 2022-01-01 PROCEDURE — 6360000002 HC RX W HCPCS: Performed by: FAMILY MEDICINE

## 2022-01-01 PROCEDURE — 71045 X-RAY EXAM CHEST 1 VIEW: CPT

## 2022-01-01 PROCEDURE — 6360000002 HC RX W HCPCS: Performed by: STUDENT IN AN ORGANIZED HEALTH CARE EDUCATION/TRAINING PROGRAM

## 2022-01-01 PROCEDURE — 85378 FIBRIN DEGRADE SEMIQUANT: CPT

## 2022-01-01 PROCEDURE — 2580000003 HC RX 258: Performed by: EMERGENCY MEDICINE

## 2022-01-01 PROCEDURE — 6370000000 HC RX 637 (ALT 250 FOR IP): Performed by: STUDENT IN AN ORGANIZED HEALTH CARE EDUCATION/TRAINING PROGRAM

## 2022-01-01 PROCEDURE — 83525 ASSAY OF INSULIN: CPT

## 2022-01-01 PROCEDURE — 80048 BASIC METABOLIC PNL TOTAL CA: CPT

## 2022-01-01 PROCEDURE — 99222 1ST HOSP IP/OBS MODERATE 55: CPT | Performed by: INTERNAL MEDICINE

## 2022-01-01 PROCEDURE — 87081 CULTURE SCREEN ONLY: CPT

## 2022-01-01 PROCEDURE — 83735 ASSAY OF MAGNESIUM: CPT

## 2022-01-01 PROCEDURE — 82800 BLOOD PH: CPT

## 2022-01-01 PROCEDURE — 93005 ELECTROCARDIOGRAM TRACING: CPT | Performed by: EMERGENCY MEDICINE

## 2022-01-01 PROCEDURE — 84484 ASSAY OF TROPONIN QUANT: CPT

## 2022-01-01 PROCEDURE — 2500000003 HC RX 250 WO HCPCS

## 2022-01-01 PROCEDURE — 6370000000 HC RX 637 (ALT 250 FOR IP): Performed by: EMERGENCY MEDICINE

## 2022-01-01 PROCEDURE — 6370000000 HC RX 637 (ALT 250 FOR IP): Performed by: FAMILY MEDICINE

## 2022-01-01 PROCEDURE — 6360000004 HC RX CONTRAST MEDICATION: Performed by: RADIOLOGY

## 2022-01-01 PROCEDURE — 84100 ASSAY OF PHOSPHORUS: CPT

## 2022-01-01 PROCEDURE — 83880 ASSAY OF NATRIURETIC PEPTIDE: CPT

## 2022-01-01 PROCEDURE — 83605 ASSAY OF LACTIC ACID: CPT

## 2022-01-01 PROCEDURE — 71275 CT ANGIOGRAPHY CHEST: CPT

## 2022-01-01 PROCEDURE — 74175 CTA ABDOMEN W/CONTRAST: CPT

## 2022-01-01 PROCEDURE — 1200000000 HC SEMI PRIVATE

## 2022-01-01 PROCEDURE — 96375 TX/PRO/DX INJ NEW DRUG ADDON: CPT

## 2022-01-01 PROCEDURE — 96372 THER/PROPH/DIAG INJ SC/IM: CPT

## 2022-01-01 PROCEDURE — 31500 INSERT EMERGENCY AIRWAY: CPT

## 2022-01-01 PROCEDURE — 80179 DRUG ASSAY SALICYLATE: CPT

## 2022-01-01 PROCEDURE — 84681 ASSAY OF C-PEPTIDE: CPT

## 2022-01-01 PROCEDURE — 96365 THER/PROPH/DIAG IV INF INIT: CPT

## 2022-01-01 PROCEDURE — 81001 URINALYSIS AUTO W/SCOPE: CPT

## 2022-01-01 PROCEDURE — 84443 ASSAY THYROID STIM HORMONE: CPT

## 2022-01-01 PROCEDURE — 2580000003 HC RX 258: Performed by: STUDENT IN AN ORGANIZED HEALTH CARE EDUCATION/TRAINING PROGRAM

## 2022-01-01 PROCEDURE — 93010 ELECTROCARDIOGRAM REPORT: CPT | Performed by: INTERNAL MEDICINE

## 2022-01-01 PROCEDURE — 2500000003 HC RX 250 WO HCPCS: Performed by: FAMILY MEDICINE

## 2022-01-01 PROCEDURE — 82077 ASSAY SPEC XCP UR&BREATH IA: CPT

## 2022-01-01 PROCEDURE — 6360000002 HC RX W HCPCS

## 2022-01-01 PROCEDURE — 2500000003 HC RX 250 WO HCPCS: Performed by: EMERGENCY MEDICINE

## 2022-01-01 PROCEDURE — 80061 LIPID PANEL: CPT

## 2022-01-01 PROCEDURE — 87502 INFLUENZA DNA AMP PROBE: CPT

## 2022-01-01 RX ORDER — POTASSIUM CHLORIDE 7.45 MG/ML
10 INJECTION INTRAVENOUS PRN
Status: DISCONTINUED | OUTPATIENT
Start: 2022-01-01 | End: 2022-01-01

## 2022-01-01 RX ORDER — DEXTROSE AND SODIUM CHLORIDE 5; .45 G/100ML; G/100ML
INJECTION, SOLUTION INTRAVENOUS CONTINUOUS
Status: DISCONTINUED | OUTPATIENT
Start: 2022-01-01 | End: 2022-01-01

## 2022-01-01 RX ORDER — FAMOTIDINE 20 MG/1
20 TABLET, FILM COATED ORAL DAILY
Qty: 60 TABLET | Refills: 1 | Status: SHIPPED | OUTPATIENT
Start: 2022-01-01

## 2022-01-01 RX ORDER — LORAZEPAM 1 MG/1
2 TABLET ORAL
Status: DISCONTINUED | OUTPATIENT
Start: 2022-01-01 | End: 2022-01-01 | Stop reason: HOSPADM

## 2022-01-01 RX ORDER — NICOTINE 21 MG/24HR
1 PATCH, TRANSDERMAL 24 HOURS TRANSDERMAL DAILY
Status: DISCONTINUED | OUTPATIENT
Start: 2022-01-01 | End: 2022-01-01 | Stop reason: HOSPADM

## 2022-01-01 RX ORDER — PEN NEEDLE, DIABETIC 32 GX 1/4"
NEEDLE, DISPOSABLE MISCELLANEOUS
Qty: 250 EACH | Refills: 5 | Status: SHIPPED | OUTPATIENT
Start: 2022-01-01

## 2022-01-01 RX ORDER — DEXTROSE MONOHYDRATE 50 MG/ML
100 INJECTION, SOLUTION INTRAVENOUS PRN
Status: DISCONTINUED | OUTPATIENT
Start: 2022-01-01 | End: 2022-01-01 | Stop reason: HOSPADM

## 2022-01-01 RX ORDER — INSULIN LISPRO 100 [IU]/ML
INJECTION, SOLUTION INTRAVENOUS; SUBCUTANEOUS
Qty: 10 PEN | Refills: 3 | Status: SHIPPED | OUTPATIENT
Start: 2022-01-01 | End: 2022-01-01 | Stop reason: HOSPADM

## 2022-01-01 RX ORDER — LORAZEPAM 1 MG/1
4 TABLET ORAL
Status: DISCONTINUED | OUTPATIENT
Start: 2022-01-01 | End: 2022-01-01 | Stop reason: HOSPADM

## 2022-01-01 RX ORDER — SODIUM CHLORIDE 9 MG/ML
INJECTION, SOLUTION INTRAVENOUS PRN
Status: DISCONTINUED | OUTPATIENT
Start: 2022-01-01 | End: 2022-01-01 | Stop reason: HOSPADM

## 2022-01-01 RX ORDER — SODIUM CHLORIDE 0.9 % (FLUSH) 0.9 %
5-40 SYRINGE (ML) INJECTION PRN
Status: DISCONTINUED | OUTPATIENT
Start: 2022-01-01 | End: 2022-01-01 | Stop reason: HOSPADM

## 2022-01-01 RX ORDER — DEXTROSE AND SODIUM CHLORIDE 5; .45 G/100ML; G/100ML
INJECTION, SOLUTION INTRAVENOUS CONTINUOUS PRN
Status: DISCONTINUED | OUTPATIENT
Start: 2022-01-01 | End: 2022-01-01

## 2022-01-01 RX ORDER — POLYETHYLENE GLYCOL 3350 17 G/17G
17 POWDER, FOR SOLUTION ORAL DAILY PRN
Status: DISCONTINUED | OUTPATIENT
Start: 2022-01-01 | End: 2022-01-01 | Stop reason: HOSPADM

## 2022-01-01 RX ORDER — ACETAMINOPHEN 500 MG
1000 TABLET ORAL ONCE
Status: COMPLETED | OUTPATIENT
Start: 2022-01-01 | End: 2022-01-01

## 2022-01-01 RX ORDER — SODIUM CHLORIDE 9 MG/ML
INJECTION, SOLUTION INTRAVENOUS CONTINUOUS
Status: DISCONTINUED | OUTPATIENT
Start: 2022-01-01 | End: 2022-01-01 | Stop reason: HOSPADM

## 2022-01-01 RX ORDER — LORAZEPAM 1 MG/1
3 TABLET ORAL
Status: DISCONTINUED | OUTPATIENT
Start: 2022-01-01 | End: 2022-01-01 | Stop reason: HOSPADM

## 2022-01-01 RX ORDER — DEXTROSE MONOHYDRATE 25 G/50ML
12.5 INJECTION, SOLUTION INTRAVENOUS PRN
Status: DISCONTINUED | OUTPATIENT
Start: 2022-01-01 | End: 2022-01-01 | Stop reason: RX

## 2022-01-01 RX ORDER — FOLIC ACID 1 MG/1
1 TABLET ORAL DAILY
Status: DISCONTINUED | OUTPATIENT
Start: 2022-01-01 | End: 2022-01-01 | Stop reason: HOSPADM

## 2022-01-01 RX ORDER — NICOTINE POLACRILEX 4 MG
15 LOZENGE BUCCAL PRN
Status: DISCONTINUED | OUTPATIENT
Start: 2022-01-01 | End: 2022-01-01 | Stop reason: HOSPADM

## 2022-01-01 RX ORDER — SODIUM CHLORIDE 9 MG/ML
INJECTION, SOLUTION INTRAVENOUS CONTINUOUS
Status: DISCONTINUED | OUTPATIENT
Start: 2022-01-01 | End: 2022-01-01

## 2022-01-01 RX ORDER — HYDROCODONE BITARTRATE AND ACETAMINOPHEN 5; 325 MG/1; MG/1
1 TABLET ORAL EVERY 4 HOURS PRN
Status: DISCONTINUED | OUTPATIENT
Start: 2022-01-01 | End: 2022-01-01 | Stop reason: HOSPADM

## 2022-01-01 RX ORDER — MAGNESIUM SULFATE 1 G/100ML
1000 INJECTION INTRAVENOUS PRN
Status: DISCONTINUED | OUTPATIENT
Start: 2022-01-01 | End: 2022-01-01

## 2022-01-01 RX ORDER — THIAMINE HYDROCHLORIDE 100 MG/ML
100 INJECTION, SOLUTION INTRAMUSCULAR; INTRAVENOUS DAILY
Status: DISCONTINUED | OUTPATIENT
Start: 2022-01-01 | End: 2022-01-01

## 2022-01-01 RX ORDER — SODIUM CHLORIDE 9 MG/ML
INJECTION, SOLUTION INTRAVENOUS ONCE
Status: COMPLETED | OUTPATIENT
Start: 2022-01-01 | End: 2022-01-01

## 2022-01-01 RX ORDER — SODIUM CHLORIDE 0.9 % (FLUSH) 0.9 %
5-40 SYRINGE (ML) INJECTION EVERY 12 HOURS SCHEDULED
Status: DISCONTINUED | OUTPATIENT
Start: 2022-01-01 | End: 2022-01-01 | Stop reason: HOSPADM

## 2022-01-01 RX ORDER — MULTIVITAMIN WITH IRON
1 TABLET ORAL DAILY
Status: DISCONTINUED | OUTPATIENT
Start: 2022-01-01 | End: 2022-01-01 | Stop reason: HOSPADM

## 2022-01-01 RX ORDER — INSULIN GLARGINE 300 U/ML
18 INJECTION, SOLUTION SUBCUTANEOUS 2 TIMES DAILY
Qty: 4 PEN | Refills: 3
Start: 2022-01-01

## 2022-01-01 RX ORDER — FLASH GLUCOSE SENSOR
KIT MISCELLANEOUS
Qty: 3 EACH | Refills: 3 | Status: SHIPPED | OUTPATIENT
Start: 2022-01-01

## 2022-01-01 RX ORDER — ARFORMOTEROL TARTRATE 15 UG/2ML
15 SOLUTION RESPIRATORY (INHALATION) 2 TIMES DAILY
Status: DISCONTINUED | OUTPATIENT
Start: 2022-01-01 | End: 2022-01-01 | Stop reason: HOSPADM

## 2022-01-01 RX ORDER — 0.9 % SODIUM CHLORIDE 0.9 %
15 INTRAVENOUS SOLUTION INTRAVENOUS ONCE
Status: DISCONTINUED | OUTPATIENT
Start: 2022-01-01 | End: 2022-01-01 | Stop reason: HOSPADM

## 2022-01-01 RX ORDER — LORAZEPAM 2 MG/ML
1 INJECTION INTRAMUSCULAR
Status: DISCONTINUED | OUTPATIENT
Start: 2022-01-01 | End: 2022-01-01 | Stop reason: HOSPADM

## 2022-01-01 RX ORDER — 0.9 % SODIUM CHLORIDE 0.9 %
1000 INTRAVENOUS SOLUTION INTRAVENOUS ONCE
Status: COMPLETED | OUTPATIENT
Start: 2022-01-01 | End: 2022-01-01

## 2022-01-01 RX ORDER — MULTIVITAMIN WITH IRON
1 TABLET ORAL DAILY
Refills: 1 | COMMUNITY
Start: 2022-01-01

## 2022-01-01 RX ORDER — HYDROCODONE BITARTRATE AND ACETAMINOPHEN 5; 325 MG/1; MG/1
2 TABLET ORAL EVERY 4 HOURS PRN
Status: DISCONTINUED | OUTPATIENT
Start: 2022-01-01 | End: 2022-01-01 | Stop reason: HOSPADM

## 2022-01-01 RX ORDER — INSULIN GLARGINE 100 [IU]/ML
0.25 INJECTION, SOLUTION SUBCUTANEOUS NIGHTLY
Status: DISCONTINUED | OUTPATIENT
Start: 2022-01-01 | End: 2022-01-01

## 2022-01-01 RX ORDER — BUDESONIDE AND FORMOTEROL FUMARATE DIHYDRATE 80; 4.5 UG/1; UG/1
2 AEROSOL RESPIRATORY (INHALATION) 2 TIMES DAILY
Status: DISCONTINUED | OUTPATIENT
Start: 2022-01-01 | End: 2022-01-01 | Stop reason: CLARIF

## 2022-01-01 RX ORDER — LORAZEPAM 2 MG/ML
2 INJECTION INTRAMUSCULAR
Status: DISCONTINUED | OUTPATIENT
Start: 2022-01-01 | End: 2022-01-01 | Stop reason: HOSPADM

## 2022-01-01 RX ORDER — INSULIN GLARGINE 100 [IU]/ML
18 INJECTION, SOLUTION SUBCUTANEOUS 2 TIMES DAILY
Qty: 10 PEN | Refills: 3 | Status: SHIPPED | OUTPATIENT
Start: 2022-01-01 | End: 2022-01-01 | Stop reason: HOSPADM

## 2022-01-01 RX ORDER — PANTOPRAZOLE SODIUM 40 MG/1
40 TABLET, DELAYED RELEASE ORAL
Status: DISCONTINUED | OUTPATIENT
Start: 2022-01-01 | End: 2022-01-01 | Stop reason: HOSPADM

## 2022-01-01 RX ORDER — MAGNESIUM SULFATE IN WATER 40 MG/ML
2000 INJECTION, SOLUTION INTRAVENOUS ONCE
Status: COMPLETED | OUTPATIENT
Start: 2022-01-01 | End: 2022-01-01

## 2022-01-01 RX ORDER — LORAZEPAM 2 MG/ML
4 INJECTION INTRAMUSCULAR
Status: DISCONTINUED | OUTPATIENT
Start: 2022-01-01 | End: 2022-01-01 | Stop reason: HOSPADM

## 2022-01-01 RX ORDER — LORAZEPAM 1 MG/1
1 TABLET ORAL
Status: DISCONTINUED | OUTPATIENT
Start: 2022-01-01 | End: 2022-01-01 | Stop reason: HOSPADM

## 2022-01-01 RX ORDER — POTASSIUM CHLORIDE 20 MEQ/1
40 TABLET, EXTENDED RELEASE ORAL ONCE
Status: COMPLETED | OUTPATIENT
Start: 2022-01-01 | End: 2022-01-01

## 2022-01-01 RX ORDER — NICOTINE 21 MG/24HR
1 PATCH, TRANSDERMAL 24 HOURS TRANSDERMAL DAILY
Status: DISCONTINUED | OUTPATIENT
Start: 2022-01-01 | End: 2022-01-01 | Stop reason: SDUPTHER

## 2022-01-01 RX ORDER — 0.9 % SODIUM CHLORIDE 0.9 %
15 INTRAVENOUS SOLUTION INTRAVENOUS ONCE
Status: COMPLETED | OUTPATIENT
Start: 2022-01-01 | End: 2022-01-01

## 2022-01-01 RX ORDER — LANCETS
EACH MISCELLANEOUS
Qty: 250 EACH | Refills: 5 | Status: SHIPPED | OUTPATIENT
Start: 2022-01-01

## 2022-01-01 RX ORDER — INSULIN GLARGINE 100 [IU]/ML
18 INJECTION, SOLUTION SUBCUTANEOUS 2 TIMES DAILY
Status: DISCONTINUED | OUTPATIENT
Start: 2022-01-01 | End: 2022-01-01 | Stop reason: HOSPADM

## 2022-01-01 RX ORDER — DEXTROSE, SODIUM CHLORIDE, AND POTASSIUM CHLORIDE 5; .45; .15 G/100ML; G/100ML; G/100ML
INJECTION INTRAVENOUS CONTINUOUS PRN
Status: DISCONTINUED | OUTPATIENT
Start: 2022-01-01 | End: 2022-01-01

## 2022-01-01 RX ORDER — GLUCOSAMINE HCL/CHONDROITIN SU 500-400 MG
CAPSULE ORAL
Qty: 250 STRIP | Refills: 5 | Status: SHIPPED | OUTPATIENT
Start: 2022-01-01

## 2022-01-01 RX ORDER — THIAMINE HYDROCHLORIDE 100 MG/ML
100 INJECTION, SOLUTION INTRAMUSCULAR; INTRAVENOUS DAILY
Status: DISCONTINUED | OUTPATIENT
Start: 2022-01-01 | End: 2022-01-01 | Stop reason: HOSPADM

## 2022-01-01 RX ORDER — LORAZEPAM 2 MG/ML
3 INJECTION INTRAMUSCULAR
Status: DISCONTINUED | OUTPATIENT
Start: 2022-01-01 | End: 2022-01-01 | Stop reason: HOSPADM

## 2022-01-01 RX ORDER — MULTIVITAMIN WITH IRON
1 TABLET ORAL DAILY
Status: DISCONTINUED | OUTPATIENT
Start: 2022-01-01 | End: 2022-01-01

## 2022-01-01 RX ORDER — KETOROLAC TROMETHAMINE 30 MG/ML
30 INJECTION, SOLUTION INTRAMUSCULAR; INTRAVENOUS ONCE
Status: COMPLETED | OUTPATIENT
Start: 2022-01-01 | End: 2022-01-01

## 2022-01-01 RX ORDER — BUDESONIDE 0.5 MG/2ML
0.5 INHALANT ORAL 2 TIMES DAILY
Status: DISCONTINUED | OUTPATIENT
Start: 2022-01-01 | End: 2022-01-01 | Stop reason: HOSPADM

## 2022-01-01 RX ORDER — INSULIN GLARGINE 100 [IU]/ML
15 INJECTION, SOLUTION SUBCUTANEOUS 2 TIMES DAILY
Status: DISCONTINUED | OUTPATIENT
Start: 2022-01-01 | End: 2022-01-01

## 2022-01-01 RX ORDER — INSULIN GLARGINE 100 [IU]/ML
20 INJECTION, SOLUTION SUBCUTANEOUS ONCE
Status: COMPLETED | OUTPATIENT
Start: 2022-01-01 | End: 2022-01-01

## 2022-01-01 RX ORDER — INSULIN LISPRO-AABC 100 [IU]/ML
INJECTION, SOLUTION INTRAVENOUS; SUBCUTANEOUS
Qty: 30 ML | Refills: 3
Start: 2022-01-01

## 2022-01-01 RX ORDER — POTASSIUM CHLORIDE 7.45 MG/ML
10 INJECTION INTRAVENOUS
Status: COMPLETED | OUTPATIENT
Start: 2022-01-01 | End: 2022-01-01

## 2022-01-01 RX ORDER — FLASH GLUCOSE SCANNING READER
EACH MISCELLANEOUS
Qty: 1 EACH | Refills: 0 | Status: SHIPPED | OUTPATIENT
Start: 2022-01-01

## 2022-01-01 RX ORDER — ONDANSETRON 2 MG/ML
4 INJECTION INTRAMUSCULAR; INTRAVENOUS ONCE
Status: COMPLETED | OUTPATIENT
Start: 2022-01-01 | End: 2022-01-01

## 2022-01-01 RX ADMIN — HYDROCODONE BITARTRATE AND ACETAMINOPHEN 1 TABLET: 5; 325 TABLET ORAL at 04:09

## 2022-01-01 RX ADMIN — HYDROCODONE BITARTRATE AND ACETAMINOPHEN 2 TABLET: 5; 325 TABLET ORAL at 18:43

## 2022-01-01 RX ADMIN — LORAZEPAM 1 MG: 1 TABLET ORAL at 16:59

## 2022-01-01 RX ADMIN — FOLIC ACID 1 MG: 1 TABLET ORAL at 21:23

## 2022-01-01 RX ADMIN — ENOXAPARIN SODIUM 80 MG: 100 INJECTION SUBCUTANEOUS at 09:15

## 2022-01-01 RX ADMIN — LORAZEPAM 2 MG: 2 INJECTION INTRAMUSCULAR; INTRAVENOUS at 08:52

## 2022-01-01 RX ADMIN — LORAZEPAM 1 MG: 2 INJECTION INTRAMUSCULAR; INTRAVENOUS at 15:18

## 2022-01-01 RX ADMIN — POTASSIUM PHOSPHATE, MONOBASIC AND POTASSIUM PHOSPHATE, DIBASIC 30 MMOL: 224; 236 INJECTION, SOLUTION, CONCENTRATE INTRAVENOUS at 20:09

## 2022-01-01 RX ADMIN — POTASSIUM CHLORIDE 10 MEQ: 7.45 INJECTION INTRAVENOUS at 10:51

## 2022-01-01 RX ADMIN — SODIUM CHLORIDE 9.98 UNITS/HR: 9 INJECTION, SOLUTION INTRAVENOUS at 15:12

## 2022-01-01 RX ADMIN — POTASSIUM CHLORIDE 40 MEQ: 20 TABLET, EXTENDED RELEASE ORAL at 09:15

## 2022-01-01 RX ADMIN — PANTOPRAZOLE SODIUM 40 MG: 40 TABLET, DELAYED RELEASE ORAL at 06:27

## 2022-01-01 RX ADMIN — ARFORMOTEROL TARTRATE 15 MCG: 15 SOLUTION RESPIRATORY (INHALATION) at 20:43

## 2022-01-01 RX ADMIN — MAGNESIUM SULFATE HEPTAHYDRATE 1000 MG: 1 INJECTION, SOLUTION INTRAVENOUS at 08:22

## 2022-01-01 RX ADMIN — POTASSIUM CHLORIDE 10 MEQ: 7.46 INJECTION, SOLUTION INTRAVENOUS at 01:55

## 2022-01-01 RX ADMIN — INSULIN LISPRO 10 UNITS: 100 INJECTION, SOLUTION INTRAVENOUS; SUBCUTANEOUS at 16:19

## 2022-01-01 RX ADMIN — LORAZEPAM 1 MG: 2 INJECTION INTRAMUSCULAR; INTRAVENOUS at 12:21

## 2022-01-01 RX ADMIN — SODIUM CHLORIDE: 9 INJECTION, SOLUTION INTRAVENOUS at 10:13

## 2022-01-01 RX ADMIN — BUDESONIDE 500 MCG: 0.5 SUSPENSION RESPIRATORY (INHALATION) at 20:12

## 2022-01-01 RX ADMIN — DEXTROSE AND SODIUM CHLORIDE: 5; 450 INJECTION, SOLUTION INTRAVENOUS at 14:43

## 2022-01-01 RX ADMIN — ARFORMOTEROL TARTRATE 15 MCG: 15 SOLUTION RESPIRATORY (INHALATION) at 08:15

## 2022-01-01 RX ADMIN — LORAZEPAM 1 MG: 2 INJECTION INTRAMUSCULAR; INTRAVENOUS at 19:53

## 2022-01-01 RX ADMIN — INSULIN LISPRO 6 UNITS: 100 INJECTION, SOLUTION INTRAVENOUS; SUBCUTANEOUS at 12:10

## 2022-01-01 RX ADMIN — ENOXAPARIN SODIUM 80 MG: 100 INJECTION SUBCUTANEOUS at 21:11

## 2022-01-01 RX ADMIN — POTASSIUM CHLORIDE 10 MEQ: 7.46 INJECTION, SOLUTION INTRAVENOUS at 16:27

## 2022-01-01 RX ADMIN — LORAZEPAM 1 MG: 2 INJECTION INTRAMUSCULAR; INTRAVENOUS at 22:29

## 2022-01-01 RX ADMIN — FOLIC ACID 1 MG: 1 TABLET ORAL at 21:11

## 2022-01-01 RX ADMIN — MULTIVITAMIN TABLET 1 TABLET: TABLET at 21:10

## 2022-01-01 RX ADMIN — ARFORMOTEROL TARTRATE 15 MCG: 15 SOLUTION RESPIRATORY (INHALATION) at 20:11

## 2022-01-01 RX ADMIN — HYDROCODONE BITARTRATE AND ACETAMINOPHEN 2 TABLET: 5; 325 TABLET ORAL at 19:53

## 2022-01-01 RX ADMIN — INSULIN LISPRO 6 UNITS: 100 INJECTION, SOLUTION INTRAVENOUS; SUBCUTANEOUS at 17:19

## 2022-01-01 RX ADMIN — INSULIN GLARGINE 20 UNITS: 100 INJECTION, SOLUTION SUBCUTANEOUS at 09:10

## 2022-01-01 RX ADMIN — SODIUM CHLORIDE 1000 ML: 9 INJECTION, SOLUTION INTRAVENOUS at 09:23

## 2022-01-01 RX ADMIN — SODIUM CHLORIDE 1000 ML: 9 INJECTION, SOLUTION INTRAVENOUS at 08:53

## 2022-01-01 RX ADMIN — PANTOPRAZOLE SODIUM 40 MG: 40 TABLET, DELAYED RELEASE ORAL at 06:22

## 2022-01-01 RX ADMIN — PANCRELIPASE 36000 UNITS: 60000; 12000; 38000 CAPSULE, DELAYED RELEASE PELLETS ORAL at 12:10

## 2022-01-01 RX ADMIN — BUDESONIDE 500 MCG: 0.5 SUSPENSION RESPIRATORY (INHALATION) at 07:50

## 2022-01-01 RX ADMIN — BUDESONIDE 500 MCG: 0.5 SUSPENSION RESPIRATORY (INHALATION) at 08:10

## 2022-01-01 RX ADMIN — ARFORMOTEROL TARTRATE 15 MCG: 15 SOLUTION RESPIRATORY (INHALATION) at 08:10

## 2022-01-01 RX ADMIN — ARFORMOTEROL TARTRATE 15 MCG: 15 SOLUTION RESPIRATORY (INHALATION) at 07:50

## 2022-01-01 RX ADMIN — BUDESONIDE 500 MCG: 0.5 SUSPENSION RESPIRATORY (INHALATION) at 08:15

## 2022-01-01 RX ADMIN — LORAZEPAM 2 MG: 2 INJECTION INTRAMUSCULAR; INTRAVENOUS at 23:18

## 2022-01-01 RX ADMIN — INSULIN LISPRO 2 UNITS: 100 INJECTION, SOLUTION INTRAVENOUS; SUBCUTANEOUS at 11:46

## 2022-01-01 RX ADMIN — INSULIN LISPRO 7 UNITS: 100 INJECTION, SOLUTION INTRAVENOUS; SUBCUTANEOUS at 12:29

## 2022-01-01 RX ADMIN — HYDROCODONE BITARTRATE AND ACETAMINOPHEN 2 TABLET: 5; 325 TABLET ORAL at 11:07

## 2022-01-01 RX ADMIN — INSULIN LISPRO 1 UNITS: 100 INJECTION, SOLUTION INTRAVENOUS; SUBCUTANEOUS at 21:24

## 2022-01-01 RX ADMIN — POTASSIUM CHLORIDE, DEXTROSE MONOHYDRATE AND SODIUM CHLORIDE: 150; 5; 450 INJECTION, SOLUTION INTRAVENOUS at 07:41

## 2022-01-01 RX ADMIN — POTASSIUM CHLORIDE, DEXTROSE MONOHYDRATE AND SODIUM CHLORIDE: 150; 5; 450 INJECTION, SOLUTION INTRAVENOUS at 00:54

## 2022-01-01 RX ADMIN — THIAMINE HYDROCHLORIDE 100 MG: 100 INJECTION, SOLUTION INTRAMUSCULAR; INTRAVENOUS at 21:23

## 2022-01-01 RX ADMIN — THIAMINE HYDROCHLORIDE 100 MG: 100 INJECTION, SOLUTION INTRAMUSCULAR; INTRAVENOUS at 00:56

## 2022-01-01 RX ADMIN — HYDROCODONE BITARTRATE AND ACETAMINOPHEN 2 TABLET: 5; 325 TABLET ORAL at 18:54

## 2022-01-01 RX ADMIN — FOLIC ACID 1 MG: 1 TABLET ORAL at 22:29

## 2022-01-01 RX ADMIN — HYDROCODONE BITARTRATE AND ACETAMINOPHEN 2 TABLET: 5; 325 TABLET ORAL at 06:50

## 2022-01-01 RX ADMIN — INSULIN LISPRO 2 UNITS: 100 INJECTION, SOLUTION INTRAVENOUS; SUBCUTANEOUS at 07:49

## 2022-01-01 RX ADMIN — SODIUM CHLORIDE, PRESERVATIVE FREE 10 ML: 5 INJECTION INTRAVENOUS at 08:16

## 2022-01-01 RX ADMIN — INSULIN GLARGINE 18 UNITS: 100 INJECTION, SOLUTION SUBCUTANEOUS at 08:18

## 2022-01-01 RX ADMIN — MAGNESIUM SULFATE HEPTAHYDRATE 1000 MG: 1 INJECTION, SOLUTION INTRAVENOUS at 07:21

## 2022-01-01 RX ADMIN — INSULIN GLARGINE 18 UNITS: 100 INJECTION, SOLUTION SUBCUTANEOUS at 21:23

## 2022-01-01 RX ADMIN — POTASSIUM CHLORIDE 40 MEQ: 20 TABLET, EXTENDED RELEASE ORAL at 08:10

## 2022-01-01 RX ADMIN — INSULIN GLARGINE 25 UNITS: 100 INJECTION, SOLUTION SUBCUTANEOUS at 21:23

## 2022-01-01 RX ADMIN — POTASSIUM CHLORIDE 10 MEQ: 7.45 INJECTION INTRAVENOUS at 12:00

## 2022-01-01 RX ADMIN — INSULIN LISPRO 2 UNITS: 100 INJECTION, SOLUTION INTRAVENOUS; SUBCUTANEOUS at 08:19

## 2022-01-01 RX ADMIN — POTASSIUM CHLORIDE 10 MEQ: 7.46 INJECTION, SOLUTION INTRAVENOUS at 04:40

## 2022-01-01 RX ADMIN — PANCRELIPASE 36000 UNITS: 60000; 12000; 38000 CAPSULE, DELAYED RELEASE PELLETS ORAL at 16:23

## 2022-01-01 RX ADMIN — POTASSIUM CHLORIDE 10 MEQ: 7.46 INJECTION, SOLUTION INTRAVENOUS at 05:43

## 2022-01-01 RX ADMIN — ONDANSETRON 4 MG: 2 INJECTION INTRAMUSCULAR; INTRAVENOUS at 08:54

## 2022-01-01 RX ADMIN — SODIUM CHLORIDE 1497 ML: 9 INJECTION, SOLUTION INTRAVENOUS at 14:38

## 2022-01-01 RX ADMIN — HYDROCODONE BITARTRATE AND ACETAMINOPHEN 2 TABLET: 5; 325 TABLET ORAL at 13:55

## 2022-01-01 RX ADMIN — HYDROCODONE BITARTRATE AND ACETAMINOPHEN 2 TABLET: 5; 325 TABLET ORAL at 23:30

## 2022-01-01 RX ADMIN — BUDESONIDE 500 MCG: 0.5 SUSPENSION RESPIRATORY (INHALATION) at 20:43

## 2022-01-01 RX ADMIN — ARFORMOTEROL TARTRATE 15 MCG: 15 SOLUTION RESPIRATORY (INHALATION) at 20:57

## 2022-01-01 RX ADMIN — MULTIVITAMIN TABLET 1 TABLET: TABLET at 21:23

## 2022-01-01 RX ADMIN — POTASSIUM CHLORIDE 10 MEQ: 7.46 INJECTION, SOLUTION INTRAVENOUS at 18:32

## 2022-01-01 RX ADMIN — THIAMINE HYDROCHLORIDE 100 MG: 100 INJECTION, SOLUTION INTRAMUSCULAR; INTRAVENOUS at 21:11

## 2022-01-01 RX ADMIN — INSULIN GLARGINE 15 UNITS: 100 INJECTION, SOLUTION SUBCUTANEOUS at 22:31

## 2022-01-01 RX ADMIN — POTASSIUM CHLORIDE 10 MEQ: 7.46 INJECTION, SOLUTION INTRAVENOUS at 19:59

## 2022-01-01 RX ADMIN — POTASSIUM CHLORIDE 10 MEQ: 7.46 INJECTION, SOLUTION INTRAVENOUS at 00:52

## 2022-01-01 RX ADMIN — INSULIN LISPRO 4 UNITS: 100 INJECTION, SOLUTION INTRAVENOUS; SUBCUTANEOUS at 12:10

## 2022-01-01 RX ADMIN — SODIUM CHLORIDE, PRESERVATIVE FREE 10 ML: 5 INJECTION INTRAVENOUS at 07:50

## 2022-01-01 RX ADMIN — KETOROLAC TROMETHAMINE 30 MG: 30 INJECTION, SOLUTION INTRAMUSCULAR at 08:54

## 2022-01-01 RX ADMIN — PANCRELIPASE 36000 UNITS: 60000; 12000; 38000 CAPSULE, DELAYED RELEASE PELLETS ORAL at 09:16

## 2022-01-01 RX ADMIN — IOPAMIDOL 75 ML: 755 INJECTION, SOLUTION INTRAVENOUS at 09:54

## 2022-01-01 RX ADMIN — HYDROCODONE BITARTRATE AND ACETAMINOPHEN 2 TABLET: 5; 325 TABLET ORAL at 14:39

## 2022-01-01 RX ADMIN — MULTIVITAMIN TABLET 1 TABLET: TABLET at 00:56

## 2022-01-01 RX ADMIN — SODIUM CHLORIDE, PRESERVATIVE FREE 10 ML: 5 INJECTION INTRAVENOUS at 21:11

## 2022-01-01 RX ADMIN — MAGNESIUM SULFATE HEPTAHYDRATE 2000 MG: 40 INJECTION, SOLUTION INTRAVENOUS at 11:33

## 2022-01-01 RX ADMIN — INSULIN LISPRO 1 UNITS: 100 INJECTION, SOLUTION INTRAVENOUS; SUBCUTANEOUS at 22:32

## 2022-01-01 RX ADMIN — INSULIN LISPRO 6 UNITS: 100 INJECTION, SOLUTION INTRAVENOUS; SUBCUTANEOUS at 07:50

## 2022-01-01 RX ADMIN — POTASSIUM CHLORIDE 10 MEQ: 7.46 INJECTION, SOLUTION INTRAVENOUS at 21:00

## 2022-01-01 RX ADMIN — SODIUM CHLORIDE, PRESERVATIVE FREE 10 ML: 5 INJECTION INTRAVENOUS at 22:30

## 2022-01-01 RX ADMIN — ACETAMINOPHEN 1000 MG: 500 TABLET ORAL at 13:37

## 2022-01-01 RX ADMIN — SODIUM PHOSPHATE, MONOBASIC, MONOHYDRATE 15 MMOL: 276; 142 INJECTION, SOLUTION INTRAVENOUS at 03:34

## 2022-01-01 RX ADMIN — HYDROCODONE BITARTRATE AND ACETAMINOPHEN 2 TABLET: 5; 325 TABLET ORAL at 16:05

## 2022-01-01 RX ADMIN — INSULIN GLARGINE 15 UNITS: 100 INJECTION, SOLUTION SUBCUTANEOUS at 07:49

## 2022-01-01 RX ADMIN — INSULIN LISPRO 10 UNITS: 100 INJECTION, SOLUTION INTRAVENOUS; SUBCUTANEOUS at 08:18

## 2022-01-01 RX ADMIN — LORAZEPAM 1 MG: 2 INJECTION INTRAMUSCULAR; INTRAVENOUS at 09:26

## 2022-01-01 RX ADMIN — PANCRELIPASE 36000 UNITS: 60000; 12000; 38000 CAPSULE, DELAYED RELEASE PELLETS ORAL at 08:10

## 2022-01-01 RX ADMIN — POTASSIUM CHLORIDE 10 MEQ: 7.46 INJECTION, SOLUTION INTRAVENOUS at 17:21

## 2022-01-01 RX ADMIN — BUDESONIDE 500 MCG: 0.5 SUSPENSION RESPIRATORY (INHALATION) at 20:57

## 2022-01-01 RX ADMIN — ENOXAPARIN SODIUM 80 MG: 100 INJECTION SUBCUTANEOUS at 22:29

## 2022-01-01 RX ADMIN — POTASSIUM CHLORIDE 10 MEQ: 7.46 INJECTION, SOLUTION INTRAVENOUS at 03:35

## 2022-01-01 RX ADMIN — PANCRELIPASE 36000 UNITS: 60000; 12000; 38000 CAPSULE, DELAYED RELEASE PELLETS ORAL at 17:20

## 2022-01-01 RX ADMIN — POTASSIUM CHLORIDE 10 MEQ: 7.46 INJECTION, SOLUTION INTRAVENOUS at 09:22

## 2022-01-01 RX ADMIN — POTASSIUM CHLORIDE 10 MEQ: 7.46 INJECTION, SOLUTION INTRAVENOUS at 07:14

## 2022-01-01 RX ADMIN — POTASSIUM CHLORIDE 10 MEQ: 7.45 INJECTION INTRAVENOUS at 11:53

## 2022-01-01 RX ADMIN — ENOXAPARIN SODIUM 100 MG: 100 INJECTION SUBCUTANEOUS at 11:32

## 2022-01-01 RX ADMIN — HYDROCODONE BITARTRATE AND ACETAMINOPHEN 2 TABLET: 5; 325 TABLET ORAL at 00:57

## 2022-01-01 RX ADMIN — POTASSIUM CHLORIDE 10 MEQ: 7.46 INJECTION, SOLUTION INTRAVENOUS at 23:40

## 2022-01-01 RX ADMIN — INSULIN LISPRO 4 UNITS: 100 INJECTION, SOLUTION INTRAVENOUS; SUBCUTANEOUS at 17:19

## 2022-01-01 RX ADMIN — IOPAMIDOL 75 ML: 755 INJECTION, SOLUTION INTRAVENOUS at 17:07

## 2022-01-01 RX ADMIN — LORAZEPAM 1 MG: 1 TABLET ORAL at 21:29

## 2022-01-01 RX ADMIN — SODIUM CHLORIDE, PRESERVATIVE FREE 10 ML: 5 INJECTION INTRAVENOUS at 21:23

## 2022-01-01 RX ADMIN — PANCRELIPASE 36000 UNITS: 60000; 12000; 38000 CAPSULE, DELAYED RELEASE PELLETS ORAL at 12:12

## 2022-01-01 RX ADMIN — SODIUM CHLORIDE, PRESERVATIVE FREE 10 ML: 5 INJECTION INTRAVENOUS at 00:59

## 2022-01-01 RX ADMIN — ENOXAPARIN SODIUM 80 MG: 100 INJECTION SUBCUTANEOUS at 21:23

## 2022-01-01 ASSESSMENT — ENCOUNTER SYMPTOMS
DIARRHEA: 0
SORE THROAT: 0
COUGH: 0
ABDOMINAL PAIN: 0
BACK PAIN: 1
NAUSEA: 1
CHEST TIGHTNESS: 1
SHORTNESS OF BREATH: 1
EYES NEGATIVE: 1
RHINORRHEA: 0
COLOR CHANGE: 0
VOMITING: 1

## 2022-01-01 ASSESSMENT — PAIN SCALES - GENERAL
PAINLEVEL_OUTOF10: 3
PAINLEVEL_OUTOF10: 8
PAINLEVEL_OUTOF10: 3
PAINLEVEL_OUTOF10: 0
PAINLEVEL_OUTOF10: 8
PAINLEVEL_OUTOF10: 8
PAINLEVEL_OUTOF10: 0
PAINLEVEL_OUTOF10: 2
PAINLEVEL_OUTOF10: 6
PAINLEVEL_OUTOF10: 0
PAINLEVEL_OUTOF10: 0
PAINLEVEL_OUTOF10: 8
PAINLEVEL_OUTOF10: 8
PAINLEVEL_OUTOF10: 5
PAINLEVEL_OUTOF10: 8
PAINLEVEL_OUTOF10: 7
PAINLEVEL_OUTOF10: 8
PAINLEVEL_OUTOF10: 1
PAINLEVEL_OUTOF10: 0
PAINLEVEL_OUTOF10: 3
PAINLEVEL_OUTOF10: 6
PAINLEVEL_OUTOF10: 6
PAINLEVEL_OUTOF10: 10
PAINLEVEL_OUTOF10: 0
PAINLEVEL_OUTOF10: 3
PAINLEVEL_OUTOF10: 8
PAINLEVEL_OUTOF10: 9
PAINLEVEL_OUTOF10: 6
PAINLEVEL_OUTOF10: 3
PAINLEVEL_OUTOF10: 8
PAINLEVEL_OUTOF10: 6
PAINLEVEL_OUTOF10: 8
PAINLEVEL_OUTOF10: 7
PAINLEVEL_OUTOF10: 0
PAINLEVEL_OUTOF10: 7
PAINLEVEL_OUTOF10: 4

## 2022-01-01 ASSESSMENT — PAIN DESCRIPTION - PAIN TYPE
TYPE: ACUTE PAIN

## 2022-01-01 ASSESSMENT — PAIN DESCRIPTION - LOCATION
LOCATION: BACK
LOCATION: CHEST;BACK
LOCATION: ABDOMEN
LOCATION: CHEST;BACK
LOCATION: ABDOMEN;BACK
LOCATION: BACK;ABDOMEN
LOCATION: CHEST;BACK
LOCATION: BACK
LOCATION: ABDOMEN
LOCATION: CHEST;BACK

## 2022-01-01 ASSESSMENT — PAIN DESCRIPTION - FREQUENCY
FREQUENCY: INTERMITTENT
FREQUENCY: CONTINUOUS
FREQUENCY: INTERMITTENT
FREQUENCY: CONTINUOUS
FREQUENCY: INTERMITTENT

## 2022-01-01 ASSESSMENT — PAIN - FUNCTIONAL ASSESSMENT

## 2022-01-01 ASSESSMENT — PAIN DESCRIPTION - DESCRIPTORS
DESCRIPTORS: ACHING;SHARP
DESCRIPTORS: ACHING;DISCOMFORT
DESCRIPTORS: ACHING;DISCOMFORT;SORE
DESCRIPTORS: ACHING;DISCOMFORT;DULL
DESCRIPTORS: ACHING;DISCOMFORT
DESCRIPTORS: DISCOMFORT;PRESSURE;SORE
DESCRIPTORS: ACHING;DISCOMFORT
DESCRIPTORS: SHARP
DESCRIPTORS: ACHING;SHARP
DESCRIPTORS: ACHING;DISCOMFORT
DESCRIPTORS: ACHING;DISCOMFORT
DESCRIPTORS: ACHING;DISCOMFORT;DULL

## 2022-01-01 ASSESSMENT — PAIN DESCRIPTION - PROGRESSION
CLINICAL_PROGRESSION: NOT CHANGED
CLINICAL_PROGRESSION: GRADUALLY IMPROVING
CLINICAL_PROGRESSION: GRADUALLY IMPROVING
CLINICAL_PROGRESSION: RAPIDLY IMPROVING
CLINICAL_PROGRESSION: GRADUALLY WORSENING
CLINICAL_PROGRESSION: GRADUALLY IMPROVING
CLINICAL_PROGRESSION: NOT CHANGED
CLINICAL_PROGRESSION: GRADUALLY WORSENING
CLINICAL_PROGRESSION: NOT CHANGED
CLINICAL_PROGRESSION: GRADUALLY WORSENING
CLINICAL_PROGRESSION: GRADUALLY IMPROVING
CLINICAL_PROGRESSION: GRADUALLY WORSENING

## 2022-01-01 ASSESSMENT — PAIN DESCRIPTION - ORIENTATION
ORIENTATION: RIGHT;LEFT;ANTERIOR;MID;UPPER
ORIENTATION: MID

## 2022-01-01 ASSESSMENT — PAIN DESCRIPTION - ONSET
ONSET: ON-GOING
ONSET: GRADUAL
ONSET: ON-GOING

## 2022-04-02 PROBLEM — R74.01 ELEVATED TRANSAMINASE LEVEL: Status: ACTIVE | Noted: 2022-01-01

## 2022-04-02 PROBLEM — K86.89 PANCREATIC MASS: Status: ACTIVE | Noted: 2022-01-01

## 2022-04-02 PROBLEM — E10.10 DKA, TYPE 1, NOT AT GOAL (HCC): Status: ACTIVE | Noted: 2022-01-01

## 2022-04-02 PROBLEM — I26.99 BILATERAL PULMONARY EMBOLISM (HCC): Status: ACTIVE | Noted: 2022-01-01

## 2022-04-02 NOTE — ED PROVIDER NOTES
[address]  Department of Emergency Medicine     Written by: Bree Alexander DO  Patient Name: Neeraj Garza  Attending Provider: Ayush Laboy DO  Admit Date: 2022  7:24 AM  MRN: [id number]    : 1990        Chief Complaint   Patient presents with    Hyperglycemia     pt with history of type II diabetes. states that sugar levels are high at home along with having some nausea and emesis. states he has body aches and fatigue as well    Nausea    Emesis    - Chief complaint    HPI   Neeraj Garza is a 28 y.o. male presenting to the ED for evaluation of Hyperglycemia (pt with history of type II diabetes. states that sugar levels are high at home along with having some nausea and emesis. states he has body aches and fatigue as well), Nausea, and Emesis      Patient has a past medical history of non-insulin-dependent DM 2, on Metformin; alcohol abuse, states he has not consumed alcohol in 1 month, depression and bipolar 2, generalized anxiety, history of pancreatitis, HTN, asthma. Patient is presenting for evaluation of fatigue, nausea and vomiting, generalized body aches; also states that his blood sugar has been elevated at home above 200 at the highest.  Denies any fevers, cough or sore throat, or changes in urination. Denies any diarrhea or black or bloody stools. Does state that he gets some mild abdominal cramping at times but denies any abdominal pain at this time. Does state that for 4 days he has been having constant aching pain in his chest and back but states that this feels like his entire body ache; does state that he feels like his heart is racing and \"I can feel my heart pulsating in my entire body. \"  Denies any history of CAD or ACS, denies any history of DVT or PE, no recent long travel and no swelling or pain of his lower extremities. Patient's complaints are severe in severity, and persistent.   No specific aggravating or alleviating factors. He has not tried anything for his symptoms. Of note, patient states that he \"supplements with Humalog\" but only does this when his sugar is elevated like it has been. Additionally, patient states \"I think I am in DKA\", the last time he was in DKA was 2 years ago. He is unsure as to why that happened on that occasion. Denies any fevers, neck pain or stiffness, cough or sore throat, black or bloody stools, urinary symptoms, numbness or tingling anywhere, lower extremity edema or tenderness. Review of Systems   Constitutional: Positive for chills and fatigue. Negative for fever. HENT: Negative for rhinorrhea and sore throat. Eyes: Negative for visual disturbance. Respiratory: Positive for shortness of breath. Negative for cough. Cardiovascular: Positive for chest pain. Negative for palpitations. Gastrointestinal: Positive for nausea and vomiting. Negative for abdominal pain and diarrhea. Endocrine: Negative for polydipsia and polyuria. Genitourinary: Negative for dysuria and frequency. Musculoskeletal: Positive for back pain and myalgias. Negative for neck pain and neck stiffness. Skin: Negative for rash and wound. Neurological: Negative for weakness and headaches. Psychiatric/Behavioral: Negative for confusion. All other systems reviewed and are negative. Physical Exam  Vitals and nursing note reviewed. Constitutional:       General: He is not in acute distress. Appearance: He is ill-appearing. HENT:      Head: Normocephalic and atraumatic. Right Ear: External ear normal.      Left Ear: External ear normal.      Nose: Nose normal. No rhinorrhea. Mouth/Throat:      Mouth: Mucous membranes are dry. Pharynx: Oropharynx is clear. Eyes:      Extraocular Movements: Extraocular movements intact. Conjunctiva/sclera: Conjunctivae normal.      Pupils: Pupils are equal, round, and reactive to light.    Cardiovascular:      Rate and Rhythm: Regular rhythm. Tachycardia present. Pulses: Normal pulses. Heart sounds: Normal heart sounds. Pulmonary:      Effort: Pulmonary effort is normal. No respiratory distress. Breath sounds: Normal breath sounds. No wheezing or rales. Abdominal:      General: Bowel sounds are normal.      Palpations: Abdomen is soft. Tenderness: There is no abdominal tenderness. There is no right CVA tenderness, left CVA tenderness, guarding or rebound. Musculoskeletal:         General: No tenderness. Normal range of motion. Cervical back: Normal range of motion and neck supple. No rigidity or tenderness. Right lower leg: No edema. Left lower leg: No edema. Skin:     General: Skin is warm and dry. Capillary Refill: Capillary refill takes less than 2 seconds. Coloration: Skin is not jaundiced or pale. Findings: No rash. Neurological:      General: No focal deficit present. Mental Status: He is alert and oriented to person, place, and time. Psychiatric:         Mood and Affect: Mood normal.         Behavior: Behavior normal.          Procedures       MDM     This is a 32-year-old male presenting for evaluation of hyperglycemia, nausea and emesis, fatigue, nausea and vomiting, generalized body aches. He was also endorsing pain across his chest over the past 4 days. Patient states that his blood sugar has been 200s at the highest over the past 4 days. He has a history of non-insulin-dependent diabetes but states that he supplements with Humalog sometimes? On arrival he is alert and oriented, is ill-appearing and uncomfortable; he is mildly tachypneic; he answers questions and responds to examination appropriately. He is afebrile. He is not hypoxic. He is tachycardic in the low 100s-110s. His mouth is dry appearing. Glucose is 216. Exam as noted above. Labs and imaging obtained. Labs reviewed and significant for anion gap 28, bicarb 11, potassium 3.4.   Beta hydroxybutyrate is greater down 4.50. Patient in DKA, IV potassium supplementation was ordered and repeat BMP shows potassium 3.5 and no other significant change. Patient was given 3 L NS boluses. He was started on DKA protocol. Given his tachycardia and endorsing of chest pain, CTA was ordered which showed small bilateral subsegmental pulmonary emboli. CT also noted pancreatic mass/lesion, further imaging studies are recommended for further evaluation at some point; this was discussed with the patient. Renal function is normal.  Patient was treated with 1 mg/kg Lovenox. Patient declined any need for narcotics during this encounter; he was treated with 1 g p.o. Tylenol for symptoms. Given these findings, decision made to admit this patient for further evaluation and management of dehydration, DKA, and pulmonary emboli. Discussed results and plan with the patient, he voiced understanding and is amenable. He remained hemodynamically stable during this encounter. Intensivist and medicine were consulted, patient is accepted for admission to the ICU. ED Course as of 04/02/22 1740   Sat Apr 02, 2022   [address] [place]  radiology called, states there are bilateral small subsegmental pulmonary emboli. There is hepatosplenomegaly. There appears to be a pancreatic mass. 1 mg/kg Lovenox ordered. [VG]   4787 [facility] lab, they state it is going to be at least another 3 minutes until patient's beta hydroxybutyrate is returned. [VG]   8329 Patient reassessed, states that he is having worsening pain; he does not want any narcotics, states he is amenable to trying a dose of p.o. Tylenol for his symptoms.   He was updated on results and plan for admission. [VG]   [id number] with Dr. Christie Rudolph, discussed case, patient is accepted for admission to the ICU.  [VG]   1238 D/w dr Emilie Regan will admit for [facility] [BERTIN]   21  Critical care:  Please note that the withdrawal or failure to initiate urgent interventions for this patient would likely result in a life threatening deterioration or permanent disability. Accordingly this patient received 33 minutes of critical care time, excluding separately billable procedures. [BERTIN]      ED Course User Index  [BERTIN] Rebecca Wyman DO  [VG] Idalia Menard DO       EKG reviewed and interpreted by me:  Rate is 103; rhythm is sinus; interpretation is sinus tachycardia, normal axis, , QRS 86, QTc 486, no ST elevations, no abnormal T wave inversions; no significant changes when compared to previous EKG from 4/6/2021. I have discussed this patient with my attending, who has seen the patient and agrees with this disposition. Patient was seen and evaluated by myself and my attending Rebecca Wyman DO. Assessment and Plan discussed with attending provider, please see attestation for final plan of care.         --------------------------------------------- PAST HISTORY ---------------------------------------------  Past Medical History:  has a past medical history of Alcohol abuse, Allergic rhinitis, Anxiety, Asthma, Bipolar 2 disorder ([facility] [address] Depression, Diabetes mellitus ([facility] [address] Generalized anxiety disorder, High anion gap metabolic acidosis, History of tobacco abuse, Plantar fasciitis of left foot, Prolonged INR, and Splenomegaly. Past Surgical History:  has a past surgical history that includes bronchoscopy (02/16/2016). Social History:  reports that he has been smoking cigarettes. He has a 2.75 pack-year smoking history. He has quit using smokeless tobacco. He reports current alcohol use of about 40.0 standard drinks of alcohol per week. He reports current drug use. Frequency: 1.00 time per week. Drug: Marijuana [place]). Family History: family history includes No Known Problems in his mother; Other in his father. The patients home medications have been reviewed.     Allergies: Patient has no known allergies.     -------------------------------------------------- RESULTS -------------------------------------------------    LABS:  Results for orders placed or performed during the hospital encounter of 04/02/22   COVID-19, Rapid    Specimen: Nasopharyngeal Swab   Result Value Ref Range    SARS-CoV-2, NAAT Not Detected Not Detected   RAPID INFLUENZA A/B ANTIGENS    Specimen: Nasopharyngeal   Result Value Ref Range    Influenza A by PCR Not Detected Not Detected    Influenza B by PCR Not Detected Not Detected   CBC with Auto Differential   Result Value Ref Range    WBC 5.7 4.5 - 11.5 E9/L    RBC 4.70 3.80 - 5.80 E12/L    Hemoglobin 16.0 12.5 - 16.5 g/dL    Hematocrit 42.3 37.0 - 54.0 %    MCV 90.0 80.0 - 99.9 fL    MCH 34.0 26.0 - 35.0 pg    MCHC 37.8 (H) 32.0 - 34.5 %    RDW 12.1 11.5 - 15.0 fL    Platelets 073 (L) 524 - 450 E9/L    MPV 9.2 7.0 - 12.0 fL    Neutrophils % 60.3 43.0 - 80.0 %    Immature Granulocytes % 0.5 0.0 - 5.0 %    Lymphocytes % 25.1 20.0 - 42.0 %    Monocytes % 11.4 2.0 - 12.0 %    Eosinophils % 1.8 0.0 - 6.0 %    Basophils % 0.9 0.0 - 2.0 %    Neutrophils Absolute 3.43 1.80 - 7.30 E9/L    Immature Granulocytes # 0.03 E9/L    Lymphocytes Absolute 1.43 (L) 1.50 - 4.00 E9/L    Monocytes Absolute 0.65 0.10 - 0.95 E9/L    Eosinophils Absolute 0.10 0.05 - 0.50 E9/L    Basophils Absolute 0.05 0.00 - 0.20 E9/L   Comprehensive Metabolic Panel w/ Reflex to MG   Result Value Ref Range    Sodium 131 (L) 132 - 146 mmol/L    Potassium reflex Magnesium 3.4 (L) 3.5 - 5.0 mmol/L    Chloride 92 (L) 98 - 107 mmol/L    CO2 11 (L) 22 - 29 mmol/L    Anion Gap 28 (H) 7 - 16 mmol/L    Glucose 221 (H) 74 - 99 mg/dL    BUN 4 (L) 6 - 20 mg/dL    CREATININE 0.7 0.7 - 1.2 mg/dL    GFR Non-African American >60 >=60 mL/min/1.73    GFR African American >60     Calcium 9.2 8.6 - 10.2 mg/dL    Total Protein 7.3 6.4 - 8.3 g/dL    Albumin 4.7 3.5 - 5.2 g/dL    Total Bilirubin 0.6 0.0 - 1.2 mg/dL    Alkaline Phosphatase 110 40 - mg/dL    GFR Non-African American >60 >=60 mL/min/1.73    GFR African American >60     Calcium 7.3 (L) 8.6 - 10.2 mg/dL   Magnesium   Result Value Ref Range    Magnesium 2.1 1.6 - 2.6 mg/dL   POCT Glucose   Result Value Ref Range    Glucose 216 mg/dL    QC OK? OK    POCT Glucose   Result Value Ref Range    Meter Glucose 216 (H) 74 - 99 mg/dL   POCT glucose   Result Value Ref Range    Glucose 152 mg/dL   POCT Glucose   Result Value Ref Range    Meter Glucose 152 (H) 74 - 99 mg/dL   POCT Glucose   Result Value Ref Range    Meter Glucose 183 (H) 74 - 99 mg/dL   POCT Glucose   Result Value Ref Range    Meter Glucose 133 (H) 74 - 99 mg/dL   EKG 12 Lead   Result Value Ref Range    Ventricular Rate 103 BPM    Atrial Rate 103 BPM    P-R Interval 132 ms    QRS Duration 86 ms    Q-T Interval 368 ms    QTc Calculation (Bazett) 482 ms    P Axis 65 degrees    R Axis 61 degrees    T Axis 51 degrees       RADIOLOGY:  CTA PULMONARY W CONTRAST   Final Result   Addendum 1 of 1   ADDENDUM:   Critical results were called by Dr. Anaya Grady MD to [facility] on   4/2/2022 at 10:50. Final   1. Small nonocclusive segmental filling defects in the lower lobes   compatible with pulmonary pulmonary embolism. Evaluation of the lower lungs   is mildly compromised due to motion artifact. 2.  No pleural fluid or focal pneumonia. 3.  Diffuse hepatic steatosis. Mild splenomegaly. 4.  Peripherally calcified lesion in the pancreatic tail measuring 3.7 x 2.5   x 1.5 cm could represent an old calcified pseudocyst or neoplasm   (pseudopapillary versus mucinous). If there is no known history of   pancreatic disease, triple phase CT of the pancreas or MRI recommended. XR CHEST PORTABLE   Final Result   No acute process.                ------------------------- NURSING NOTES AND VITALS REVIEWED ---------------------------  Date / Time Roomed:  4/2/2022  7:24 AM  ED Bed Assignment:  [id number]    The nursing notes within the ED encounter and vital signs as below have been reviewed. Patient Vitals for the past 24 hrs:   BP Temp Temp src Pulse Resp SpO2 Height Weight   04/02/22 1700 (!) 156/99 -- -- 118 19 99 % -- --   04/02/22 1600 (!) 153/95 -- -- 104 21 100 % -- --   04/02/22 1542 (!) 153/95 98.8 °F (37.1 °C) Oral 99 13 100 % 5' 7\" (1.702 m) 180 lb (81.6 kg)   04/02/22 1500 (!) 140/91 -- -- 97 24 99 % -- --   04/02/22 1400 (!) 139/95 -- -- 100 24 99 % -- --   04/02/22 1300 128/84 98.2 °F (36.8 °C) Oral 108 24 98 % -- --   04/02/22 1017 (!) 142/82 98 °F (36.7 °C) Oral 104 24 100 % -- --   04/02/22 0900 127/82 -- -- 92 24 98 % -- --   04/02/22 0747 -- -- -- -- 20 -- -- 220 lb (99.8 kg)   04/02/22 0746 (!) 150/94 98.8 °F (37.1 °C) -- 122 -- 98 % -- --       Oxygen Saturation Interpretation: Normal    ------------------------------------------ PROGRESS NOTES ------------------------------------------  Re-evaluation(s):  Please see ED course    Counseling:  I have spoken with the patient and mother and discussed todays results, in addition to providing specific details for the plan of care and counseling regarding the diagnosis and prognosis. Their questions are answered at this time and they are agreeable with the plan of admission.    --------------------------------- ADDITIONAL PROVIDER NOTES ---------------------------------  Consultations:  Time: 1400  Re-evaluation. Patients symptoms are improving  Repeat physical examination is improved      This patient's ED course included: a personal history and physicial examination, re-evaluation prior to disposition, multiple bedside re-evaluations, IV medications, cardiac monitoring, continuous pulse oximetry and complex medical decision making and emergency management    This patient has remained hemodynamically stable during their ED course. Diagnosis:  1. Diabetic ketoacidosis without coma associated with diabetes mellitus due to underlying condition ([facility] [address]    2.  Acute pulmonary embolism without acute cor pulmonale, unspecified pulmonary embolism type ([facility] [address]    3. Pancreatic mass    4. Splenomegaly        Disposition:  Patient's disposition: Admit to CCU/ICU  Patient's condition is critical.      Please note that the withdrawal or failure to initiate urgent interventions for this patient would likely result in a life threatening deterioration or permanent disability. Accordingly this patient received 33 minutes of critical care time, excluding separately billable procedures.        June Choi DO  Resident  04/02/22 2558

## 2022-04-02 NOTE — H&P
only eating 1 meal a day he was still taking his Metformin and his Humalog. His last meal was yesterday with hummus and errol which did stay down without issue. Patient also states that he has been having intermittent chest and back pain associated with fatigue and mild shortness of breath. Denies any fever chills. Denies any loss of taste or smell. Denies any known sick contact or recent travel in the last several weeks. Upon arrival testing found him to be in DKA with high anion gap metabolic acidosis, hyponatremia, elevated transaminases elevated lactic acid, and elevated hydroxybutyrate. Imaging also revealed bilateral lower lobe pulmonary emboli and a pancreatic mass which may be indicative of chronic pancreatitis. Patient does have a history of alcohol abuse in the past and has seen gastroenterology. To this end he was started on Lovenox and admitted to the ICU for insulin protocol. Informant(s) for H&P: Patient and family member    REVIEW OF SYSTEMS:  A comprehensive review of systems was negative except for: what is in the HPI      PMH:  Past Medical History:   Diagnosis Date    Alcohol abuse     Allergic rhinitis 3/9/2015    Anxiety     Asthma     Bipolar 2 disorder ([facility] [address] 3/9/2015    Depression     Diabetes mellitus ([facility] [address]     Generalized anxiety disorder 3/4/2016    With agoraphobia    High anion gap metabolic acidosis 4/64/2676    History of tobacco abuse 2/25/2016    Plantar fasciitis of left foot 3/3/2016    Prolonged INR 2/19/2016    Splenomegaly 3/4/2016     3/2016       Surgical History:  Past Surgical History:   Procedure Laterality Date    BRONCHOSCOPY  02/16/2016       Medications Prior to Admission:    Prior to Admission medications    Medication Sig Start Date End Date Taking?  Authorizing Provider   nicotine polacrilex (COMMIT) 2 MG lozenge Take 1 lozenge by mouth as needed for Smoking cessation 4/12/21 9/21/04  CHUCK Wright CNP   prazosin (MINIPRESS) 2 MG capsule Take 1 capsule by mouth nightly 4/12/21 8/55/72  Mon CristinaCHUCK - CNP   sertraline (ZOLOFT) 25 MG tablet Take 3 tablets by mouth daily 4/13/21 3/02/31  Aldair ZunigaCHUCK CNP   metFORMIN (GLUCOPHAGE) 1000 MG tablet Take 1 tablet by mouth daily (with breakfast) 3/9/21   Stevan Abbott MD   albuterol sulfate HFA (VENTOLIN HFA) 108 (90 Base) MCG/ACT inhaler Inhale 2 puffs into the lungs every 4 hours as needed for Wheezing 3/9/21   Stevan Abbott MD   vitamin D (ERGOCALCIFEROL) 1.25 MG (18493 UT) CAPS capsule Take 1 capsule by mouth once a week  Patient taking differently: Take 50,000 Units by mouth once a week Last taken 4/3/21 10/27/20   Stevan Abbott MD   budesonide-formoterol (SYMBICORT) 80-4.5 MCG/ACT AERO Inhale 2 puffs into the lungs 2 times daily 10/27/20   Stevan Abbott MD       Allergies:    Patient has no known allergies. Social History:    reports that he has been smoking cigarettes. He has a 2.75 pack-year smoking history. He has quit using smokeless tobacco. He reports current alcohol use of about 40.0 standard drinks of alcohol per week. He reports current drug use. Frequency: 1.00 time per week. Drug: Marijuana [place]). Family History:   family history includes No Known Problems in his mother; Other in his father. PHYSICAL EXAM:  Vitals:  BP (!) 142/82   Pulse 104   Temp 98 °F (36.7 °C) (Oral)   Resp 24   Wt 220 lb (99.8 kg)   SpO2 100%   BMI 34.46 kg/m²     General Appearance: alert and oriented to person, place and time. Mild pain and currently holding abdomen.   Skin: warm and dry  Head: normocephalic and atraumatic  Eyes: pupils equal, round, and reactive to light, extraocular eye movements intact, conjunctivae normal  Neck: neck supple and non tender without mass   Pulmonary/Chest: clear to auscultation bilaterally- no wheezes, rales or rhonchi, normal air movement, no respiratory distress  Cardiovascular: Tachycardic, normal S1 and S2 and no carotid bruits  Abdomen: soft, non-tender, non-distended, slightly increased bowel sounds, no masses or organomegaly  Extremities: no cyanosis, no clubbing and no edema  Neurologic: no cranial nerve deficit and speech normal        LABS:  Recent Labs     04/02/22  0800 04/02/22  0806   *  --    K 3.4*  --    CL 92*  --    CO2 11*  --    BUN 4*  --    CREATININE 0.7  --    GLUCOSE 221* 216   CALCIUM 9.2  --        Recent Labs     04/02/22  0800   WBC 5.7   RBC 4.70   HGB 16.0   HCT 42.3   MCV 90.0   MCH 34.0   MCHC 37.8*   RDW 12.1   *   MPV 9.2       No results for input(s): POCGLU in the last 72 hours. CMP:    Lab Results   Component Value Date     04/02/2022    K 3.4 04/02/2022    CL 92 04/02/2022    CO2 11 04/02/2022    BUN 4 04/02/2022    CREATININE 0.7 04/02/2022    GFRAA >60 04/02/2022    LABGLOM >60 04/02/2022    GLUCOSE 216 04/02/2022    GLUCOSE 84 11/19/2011    PROT 7.3 04/02/2022    LABALBU 4.7 04/02/2022    LABALBU 4.6 11/19/2011    CALCIUM 9.2 04/02/2022    BILITOT 0.6 04/02/2022    ALKPHOS 110 04/02/2022    AST 49 04/02/2022    ALT 81 04/02/2022     Magnesium:    Lab Results   Component Value Date    MG 1.6 04/02/2022     Phosphorus:    Lab Results   Component Value Date    PHOS 1.8 03/13/2020     Troponin:    Lab Results   Component Value Date    TROPONINI <0.01 02/01/2016     VBG:    Lab Results   Component Value Date    PHVEN 7.29 04/02/2022     LIPASE:    Lab Results   Component Value Date    LIPASE 68 04/02/2022       Radiology:   CTA PULMONARY W CONTRAST   Final Result   Addendum 1 of 1   ADDENDUM:   Critical results were called by Dr. Jarrett Weber MD to [facility] on   4/2/2022 at 10:50. Final   1. Small nonocclusive segmental filling defects in the lower lobes   compatible with pulmonary pulmonary embolism. Evaluation of the lower lungs   is mildly compromised due to motion artifact. 2.  No pleural fluid or focal pneumonia.       3.  Diffuse hepatic steatosis. Mild splenomegaly. 4.  Peripherally calcified lesion in the pancreatic tail measuring 3.7 x 2.5   x 1.5 cm could represent an old calcified pseudocyst or neoplasm   (pseudopapillary versus mucinous). If there is no known history of   pancreatic disease, triple phase CT of the pancreas or MRI recommended. XR CHEST PORTABLE   Final Result   No acute process. EKG: Sinus tachycardia and normal axis with no acute ST segment or T wave changes noted. NOTE: This report was transcribed using voice recognition software. Every effort was made to ensure accuracy; however, inadvertent computerized transcription errors may be present.   Electronically signed by Dixie Kimble DO on 4/2/2022 at 12:56 PM

## 2022-04-02 NOTE — CONSULTS
CRITICAL CARE CONSULT NOTE      Patient - Gray Sandoval   MRN -  [id number]   Ford # - [de-identified]   - 1990      Date of Admission -  2022  7:24 AM  Date of evaluation -  2022  0205/0205-A   Hospital Day - 0      CC/REASON FOR ICU ADMISSION:  DKA    HISTORY OF PRESENT ILLNESS:     This is a 70-year-old male with Hx DM, anxiety/depression, tobacco use, alcohol abuse, pancreatitis, bipolar presents with nausea and vomiting. Patient with diabetes mellitus not currently taking insulin. Is only been taking Metformin and some old Humalog he had. Not consistently checking glucose levels. .  Start developing nausea and vomiting. Patient with daily alcohol use of undetermined amount. Reports last drink was 3-4 days ago. On presentation emergency department and have anion gap metabolic acidosis with elevated beta hydroxy. Also having some chest pain CT pulmonary angiogram was performed which showed small nonocclusive segmental pulmonary emboli in the lower lobes. Has been given dose of Lovenox. He also has a peripheral calcified lesion in the pancreatic tail. Past Medical History:   Diagnosis Date    Alcohol abuse     Allergic rhinitis 3/9/2015    Anxiety     Asthma     Bipolar 2 disorder ([facility] [address] 3/9/2015    Depression     Diabetes mellitus ([facility] [address]     Generalized anxiety disorder 3/4/2016    With agoraphobia    High anion gap metabolic acidosis     History of tobacco abuse 2016    Plantar fasciitis of left foot 3/3/2016    Prolonged INR 2016    Splenomegaly 3/4/2016     3/2016     Past Surgical History:   Procedure Laterality Date    BRONCHOSCOPY  2016     Social History     Tobacco Use    Smoking status: Current Every Day Smoker     Packs/day: 0.25     Years: 11.00     Pack years: 2.75     Types: Cigarettes    Smokeless tobacco: Former User   Vaping Use    Vaping Use: Every day   Substance Use Topics    Alcohol use:  Yes     Alcohol/week: 40.0 standard drinks     Types: 40 Shots of liquor per week     Comment: 1 LITER OF 80PROOF VODKA DAILY    Drug use: Yes     Frequency: 1.0 times per week     Types: Marijuana (Weed)     Comment: once a week while drinking     Family History   Problem Relation Age of Onset    No Known Problems Mother     Other Father         bipolar     No Known Allergies    REVIEW OF SYSTEMS:     Review of Systems   Constitutional: Negative for chills, fatigue and fever. HENT: Negative. Eyes: Negative. Respiratory: Positive for chest tightness and shortness of breath. Cardiovascular: Positive for chest pain. Negative for leg swelling. Gastrointestinal: Positive for nausea and vomiting. Negative for abdominal pain. Endocrine: Negative for cold intolerance and heat intolerance. Genitourinary: Negative for difficulty urinating and dysuria. Musculoskeletal: Negative. Skin: Negative for color change and rash. Allergic/Immunologic: Negative for environmental allergies and immunocompromised state. Neurological: Negative for dizziness and weakness. Psychiatric/Behavioral: Negative for agitation and confusion. PHYSICAL EXAMINATION:     Vital signs:   height is 5' 7\" (1.702 m) and weight is 180 lb (81.6 kg). His oral temperature is 98.8 °F (37.1 °C). His blood pressure is 156/99 (abnormal) and his pulse is 118. His respiration is 19 and oxygen saturation is 99%. No intake/output data recorded. Physical Exam  Constitutional:       General: He is not in acute distress. HENT:      Head: Normocephalic and atraumatic. Mouth/Throat:      Pharynx: No oropharyngeal exudate. Eyes:      General: No scleral icterus. Conjunctiva/sclera: Conjunctivae normal.   Neck:      Trachea: No tracheal deviation. Cardiovascular:      Rate and Rhythm: Normal rate. Heart sounds: Normal heart sounds. Pulmonary:      Effort: Pulmonary effort is normal.      Breath sounds: Normal breath sounds.    Abdominal: Palpations: Abdomen is soft. Tenderness: There is no abdominal tenderness. Musculoskeletal:         General: No swelling or deformity. Cervical back: Neck supple. Lymphadenopathy:      Cervical: No cervical adenopathy. Skin:     General: Skin is warm. Findings: No rash. Neurological:      General: No focal deficit present. Mental Status: He is alert and oriented to person, place, and time. Psychiatric:         Behavior: Behavior normal.         PERTINENT LAB RESULTS: Labs reviewed. DIAGNOSTICS:  Pertinent imaging reviewed. ASSESMENT/PLAN:     Assessment:    1. DKA patient with history of diabetes, noncompliant with regiment. 2. Small bilateral segmental pulmonary emboli  3. EtOH abuse, history of withdrawal  4. Pancreatic mass with history of chronic pancreatitis  5. Bipolar 2  6. Tobacco use    Consultants: Endocrine, GI     Continue IV fluid resuscitation, DKA protocol with serial electrolyte check and replacement   Consult endocrine   GI consulted for evaluation regarding pancreatic mass   Thiamine, folic acid, multivitamin. Placed on CIWA protocol   Full dose Lovenox for now can likely transition to oral anticoagulation    GI/DVT - SUP/Lovenox full AC        Code Status: Full Code    Total critical care time spent reviewing chart records and managing patient at the bedside is 35 minutes exclusive of procedures or overlap.     ELECTRONICALLY SIGNED:  Teresa Duran DO

## 2022-04-02 NOTE — PROGRESS NOTES
Pt. Escorted to the unit with RN and CNA. Pt. Awake alert and oriented. Ambulatory to bedside and bathroom without assistance. VSS pt. Is hypertensive and complaining of mid chest and upper back pain. Color is red and face is swollen. No other edema noted. Lung sounds are clear heart is normal and regular. Skin integrity is intact. Pt. Has a mole to the right upper shoulder blade and there is a darkened red area around that mole. Pt. States that he has had this for quite some time. Pt. States that he drinks vodka daily and has been only eating ramen noodles as his source of food for about 3 months due to financial issues. Pt. States that he has a job and has been working. He also has insurance through his work. Pt. states that he has depression and hypertension but he no longer feels that he needs to take meds for them. He also stopped taking his metformin a year and a half ago when he got his HGb A1C  Down to 3 by eating only broccoli, cauliflower and carrots with basalmic vinegar. Pt. States that he smokes cigarettes and does not use any illegal drugs. He does use an albuterol inhaler PRN for asthma attacks. He does not check his blood sugars. His last BM was Thursday and that's when he started to vomit after each meal. Pt. Was placed on monitor and oriented to his room. Shirt and jacket and boots placed in closet in the pt. Room. Cell phone at bedside.

## 2022-04-03 NOTE — PROGRESS NOTES
Critical Care Team - Daily Progress Note      Date and time: 4/3/2022 9:09 AM  Patient's name:  Mary Jane Sandoval  Medical Record Number: [id number]  Patient's account/billing number: [de-identified]  Patient's YOB: 1990  Age: 28 y.o. Date of Admission: 4/2/2022  7:24 AM  Length of stay during current admission: 1      Primary Care Physician: Ryan Mijares MD  ICU Attending Physician: Dr. Tank Mesa Status: Full Code    Reason for ICU admission: DKA      SUBJECTIVE:     OVERNIGHT EVENTS:        04/03: No acute events overnight. Patient was examined at bedside and was complaining of mild chest pain which resolved. Patient states that he has an appetite. Denies abdominal pain, N/V, HA, dizziness.     AWAKE & FOLLOWING COMMANDS:  [] No   [x] Yes    CURRENT VENTILATION STATUS:     [] Ventilator  [] BIPAP  [] Nasal Cannula [x] Room Air      IF INTUBATED, ET TUBE MARKING AT LOWER LIP:       cms    SECRETIONS Amount:  [] Small [] Moderate  [] Large  [x] None  Color:     [] White [] Colored  [] Bloody    SEDATION:  RAAS Score:  [] Propofol gtt  [] Versed gtt  [] Ativan gtt   [x] No Sedation    PARALYZED:  [x] No    [] Yes    DIARRHEA:                [x] No                [] Yes  (C. Difficile status: [] positive                                                                                                                       [] negative                                                                                                                     [] pending)    VASOPRESSORS:  [x] No    [] Yes    If yes -   [] Levophed       [] Dopamine     [] Vasopressin       [] Dobutamine  [] Phenylephrine         [] Epinephrine    CENTRAL LINES:     [x] No   [] Yes   (Date of Insertion:   )           If yes -     [] Right IJ     [] Left IJ [] Right Femoral [] Left Femoral                   [] Right Subclavian [] Left Subclavian       VALENTE'S CATHETER:   [x] No   [] Yes  (Date of Insertion:   ) URINE OUTPUT:            [x] Good   [] Low              [] Anuric      OBJECTIVE:     VITAL SIGNS:  /79   Pulse 82   Temp 98.1 °F (36.7 °C) (Oral)   Resp 12   Ht 5' 7\" (1.702 m)   Wt 180 lb (81.6 kg)   SpO2 97%   BMI 28.19 kg/m²   Tmax over 24 hours:  Temp (24hrs), Av.2 °F (36.8 °C), Min:97.7 °F (36.5 °C), Max:98.8 °F (37.1 °C)      Patient Vitals for the past 6 hrs:   BP Temp Temp src Pulse Resp SpO2   22 0733 102/79 98.1 °F (36.7 °C) Oral 82 12 97 %   22 0600 (!) 151/104 -- -- 92 11 98 %   22 0500 115/82 -- -- 90 28 98 %   22 0400 (!) 122/90 98 °F (36.7 °C) Oral 77 11 98 %         Intake/Output Summary (Last 24 hours) at 4/3/2022 0909  Last data filed at 4/3/2022 0749  Gross per 24 hour   Intake 4345.72 ml   Output 3900 ml   Net 445.72 ml     Wt Readings from Last 2 Encounters:   22 180 lb (81.6 kg)   21 214 lb 9.6 oz (97.3 kg)     Body mass index is 28.19 kg/m².         PHYSICAL EXAMINATION:    General appearance - alert, well appearing, and in no distress  Mental status - alert, oriented to person, place, and time  Eyes - pupils equal and reactive, extraocular eye movements intact, sclera anicteric  Ears - not examined  Nose - not examined  Mouth - mucous membranes moist, pharynx normal without lesions  Neck - supple, no significant adenopathy  Chest - Mild expiratory wheeze  Heart - normal rate, regular rhythm, normal S1, S2, no murmurs, rubs, clicks or gallops  Abdomen - soft, nontender, nondistended, no masses or organomegaly  Neurological - alert, oriented, normal speech, no focal findings or movement disorder noted  Extremities - peripheral pulses normal, no pedal edema, no clubbing or cyanosis  Skin - normal coloration and turgor, no rashes, no suspicious skin lesions noted        MEDICATIONS:    Scheduled Meds:   insulin glargine  20 Units SubCUTAneous Once    insulin lispro  0-12 Units SubCUTAneous TID WC    insulin lispro  0-6 Units SubCUTAneous Nightly    sodium chloride  15 mL/kg IntraVENous Once    insulin glargine  0.25 Units/kg SubCUTAneous Nightly    budesonide  0.5 mg Nebulization BID    Arformoterol Tartrate  15 mcg Nebulization BID    sodium chloride flush  5-40 mL IntraVENous 2 times per day    folic acid  1 mg Oral Daily    enoxaparin  1 mg/kg SubCUTAneous BID    pantoprazole  40 mg Oral QAM AC    multivitamin  1 tablet Oral Daily    thiamine  100 mg IntraVENous Daily    nicotine  1 patch TransDERmal Daily     Continuous Infusions:   sodium chloride      sodium chloride      sodium chloride       PRN Meds:   potassium chloride, 10 mEq, PRN  magnesium sulfate, 1,000 mg, PRN  sodium phosphate IVPB, 10 mmol, PRN   Or  sodium phosphate IVPB, 15 mmol, PRN   Or  sodium phosphate IVPB, 20 mmol, PRN  polyethylene glycol, 17 g, Daily PRN  HYDROcodone 5 mg - acetaminophen, 1 tablet, Q4H PRN   Or  HYDROcodone 5 mg - acetaminophen, 2 tablet, Q4H PRN  HYDROmorphone, 0.25 mg, Q3H PRN   Or  HYDROmorphone, 0.5 mg, Q3H PRN  potassium chloride, 10 mEq, PRN  magnesium sulfate, 1,000 mg, PRN  sodium phosphate IVPB, 10 mmol, PRN   Or  sodium phosphate IVPB, 15 mmol, PRN   Or  sodium phosphate IVPB, 20 mmol, PRN  dextrose bolus (hypoglycemia), 125 mL, PRN   Or  dextrose bolus (hypoglycemia), 250 mL, PRN  sodium chloride flush, 5-40 mL, PRN  sodium chloride, , PRN  LORazepam, 1 mg, Q1H PRN   Or  LORazepam, 1 mg, Q1H PRN   Or  LORazepam, 2 mg, Q1H PRN   Or  LORazepam, 2 mg, Q1H PRN   Or  LORazepam, 3 mg, Q1H PRN   Or  LORazepam, 3 mg, Q1H PRN   Or  LORazepam, 4 mg, Q1H PRN   Or  LORazepam, 4 mg, Q1H PRN          VENT SETTINGS (Comprehensive) (if applicable):  Vent Information  SpO2: 97 %  Additional Respiratory  Assessments  Pulse: 82  Resp: 12  SpO2: 97 %      Laboratory findings:    Complete Blood Count:   Recent Labs     04/02/22  0800 04/03/22  0630   WBC 5.7 2.0*   HGB 16.0 12.8   HCT 42.3 34.3*   * 64*        Last 3 Blood Glucose:   Recent Labs     04/02/22  2200 04/03/22  0240 04/03/22  0630   GLUCOSE 256* 104* 254*        PT/INR:    Lab Results   Component Value Date    PROTIME 11.1 03/03/2021    INR 1.0 03/03/2021     PTT:    Lab Results   Component Value Date    APTT 28.2 02/20/2016       Comprehensive Metabolic Profile:   Recent Labs     04/02/22  0800 04/02/22  0806 04/02/22  2200 04/03/22  0240 04/03/22  0630   *   < > 132 138 134   K 3.4*   < > 3.5 3.4* 3.4*   CL 92*   < > 99 106 100   CO2 11*   < > 13* 20* 21*   BUN 4*   < > <2* <2* <2*   CREATININE 0.7   < > 0.6* 0.6* 0.5*   GLUCOSE 221*   < > 256* 104* 254*   CALCIUM 9.2   < > 7.7* 7.8* 8.0*   PROT 7.3  --   --   --   --    LABALBU 4.7  --   --   --   --    BILITOT 0.6  --   --   --   --    ALKPHOS 110  --   --   --   --    AST 49*  --   --   --   --    ALT 81*  --   --   --   --     < > = values in this interval not displayed. Magnesium:   Lab Results   Component Value Date    MG 1.6 04/03/2022     Phosphorus:   Lab Results   Component Value Date    PHOS 3.4 04/03/2022     Ionized Calcium:   Lab Results   Component Value Date    CAION 1.20 02/08/2016        Urinalysis:     Troponin: No results for input(s): TROPONINI in the last 72 hours.     Microbiology:    Cultures during this admission:     Blood cultures:                 [x] None drawn      [] Negative             []  Positive (Details:  )  Urine Culture:                   [x] None drawn      [] Negative             []  Positive (Details:  )  Sputum Culture:               [x] None drawn       [] Negative             []  Positive (Details:  )   Endotracheal aspirate:     [x] None drawn       [] Negative             []  Positive (Details:  )     Other pertinent Labs:       Radiology/Imaging:     Chest Xray (4/3/2022):    ASSESSMENT:     Patient Active Problem List    Diagnosis Date Noted    DKA, type 1, not at goal [facility]) 04/02/2022    Elevated transaminase level 04/02/2022    Pancreatic mass 04/02/2022    Bilateral pulmonary embolism ([facility] 04/02/2022    Severe episode of recurrent major depressive disorder, without psychotic features ([facility] 04/07/2021    PTSD (post-traumatic stress disorder) 04/07/2021    Alcohol abuse 03/09/2021    Diabetes mellitus ([facility] 03/12/2020    Essential hypertension 03/16/2016    Splenomegaly 03/04/2016    Generalized anxiety disorder 03/04/2016    Subclinical hypothyroidism 03/03/2016    History of tobacco abuse 02/25/2016    High anion gap metabolic acidosis 38/65/6955    History of pancreatitis 02/01/2016    Depression 03/09/2015    Bipolar 2 disorder ([facility] 03/09/2015    Mild persistent asthma without complication 20/24/0661    Allergic rhinitis 03/09/2015    History of alcohol abuse              PLAN:     Neuro   Thiamine, folic acid, multivitamin. Placed on CIWA, ativan given this morning        Respiratory   Brovana and Pulmicort BID     Cardiovascular    N/A     Gastrointestinal     GI PPx  Protonix 40 mg  daily  Glycolax PRN    Pancreatic Mass with hx of Pancreatitis  GI consulted     Renal    N/A       Infectious Disease    N/A       Hematology/Oncology      Bilateral PE  Lovenox 80 mg twice daily, hold coagulation today due to drop in plts  Plan to transition to oral coagulation     Endocrine     DKA  Anion gap closed x 2  Bridge to sub q insulin  MDSSI  Advance diet  Endocrine following     Social/Spiritual/DNR/Other   Full code             WEAN PER PROTOCOL:  [] No   [] Yes  [x] N/A    DISCONTINUE ANY LABS:   [] No   [x] Yes    ICU PROPHYLAXIS:  Stress ulcer:  [x] PPI Agent  [] [id number] [] Sucralfate  [] Other:  VTE:   [x] Enoxaparin  [] Unfract. Heparin Subcut  [] EPC Cuffs    NUTRITION:  [] NPO [] Tube Feeding (Specify: ) [] TPN  [] PO (Diet: ADULT DIET;  Regular; 4 carb choices (60 gm/meal))    HOME MEDICATIONS RECONCILED: [] No  [x] Yes    INSULIN DRIP:   [] No   [x] Yes    CONSULTATION NEEDED:  [] No   [x] Yes    FAMILY UPDATED:    [x] No   [] Yes    TRANSFER OUT OF ICU:   [] No   [] Yes    ADDITIONAL PLAN:      Vijay Ambriz MD, PGY-1                    4/3/2022, 9:09 AM     I personally saw, examined and provided care for the patient. Radiographs, labs and medication list were reviewed by me independently. Review of Residents documentation was conducted and revisions were made as appropriate. I agree with the above documented exam, problem list and plan of care. Assessment:     1. DKA patient with history of diabetes, noncompliant with regiment. 2. Small bilateral segmental pulmonary emboli  3. EtOH abuse, history of withdrawal  4. Pancreatic mass with history of chronic pancreatitis  5. Bipolar 2  6. Tobacco use  7. thrombocytopenia     Consultants: Endocrine, GI     · Diabetic diet, bridge with Lantus and ISS  · Thiamine, folic acid, multivitamin. Placed on CIWA protocol  · Full dose Lovenox for now. Continue anticoagulation pending platelet.  If platelets stabilize can likely transition to [facility].     GI/DVT - SUP/Lovenox full AC    CCT excluding procedures 31 minutes    Rowdy Clifton DO

## 2022-04-03 NOTE — CONSULTS
ENDOCRINOLOGY INITIAL CONSULTATION NOTE      Date of admission: 4/2/2022  Date of service: 4/3/2022  Admitting physician: Michelle Moreno DO   Primary Care Physician: Belen Krueger MD  Consultant physician: Gabriela Mauro MD     Reason for the consultation:  Uncontrolled DM    History of Present Illness: The history is provided by the patient. Accuracy of the patient data is excellent    Kevin Norton is a very pleasant 28 y.o. old male with PMH of poorly controlled DM, pancreatitis, alcohol abuse, and other  listed below admitted to [facility] on 4/2/2022 because of nausea and vomiting and chest pain and found to be in DKA, endocrine service was consulted for diabetes management. Admission labs , AG 28, Bicarb 11, Cr 0.7, K 3.4, BHB >4.5     CT pulmonary angiogram was performed on admission and showed small nonocclusive segmental pulmonary emboli in the lower lobes. He also has a peripheral calcified lesion in the pancreatic tail    Prior to admission  The patient was diagnosed with DM about two years ago. Prior to admission patient was on Metformin 1000 mg BD but admit poor compliance with taking it. Patient has had no hypoglycemic episodes. Patient has not been eating consistent carbohydrate meals and wasn't checking BG at home. In addition, patient denied macrovascular or microvascular complications.  He is over due with yearly diabetic eye exam. Patient admit daily alcohol use of undetermined amount     Point of care glucose monitoring   (Independently reviewed)   Recent Labs     04/02/22  2323 04/03/22  0036 04/03/22  0141 04/03/22  0433 04/03/22  0625 04/03/22  0759 04/03/22  0854 04/03/22  1144   GLUMET 189* 154* 111* 85 238* 295* 223* 186*     Past medical history:   Past Medical History:   Diagnosis Date    Alcohol abuse     Allergic rhinitis 3/9/2015    Anxiety     Asthma     Bipolar 2 disorder ([address] 3/9/2015    Depression     Diabetes mellitus (HCC)     Generalized anxiety disorder 3/4/2016    With agoraphobia    High anion gap metabolic acidosis 1/44/1686    History of tobacco abuse 2/25/2016    Plantar fasciitis of left foot 3/3/2016    Prolonged INR 2/19/2016    Splenomegaly 3/4/2016    US 3/2016       Past surgical history:  Past Surgical History:   Procedure Laterality Date    BRONCHOSCOPY  02/16/2016       Social history:   Tobacco:   reports that he has been smoking cigarettes. He has a 2.75 pack-year smoking history. He has quit using smokeless tobacco.  Alcohol:   reports current alcohol use of about 40.0 standard drinks of alcohol per week. Drugs:   reports current drug use. Frequency: 1.00 time per week. Drug: Marijuana Madiha Ing).     Family history:    Family History   Problem Relation Age of Onset    No Known Problems Mother     Other Father         bipolar       Allergy and drug reactions:   No Known Allergies    Scheduled Meds:   insulin lispro  0-12 Units SubCUTAneous TID WC    insulin lispro  0-6 Units SubCUTAneous Nightly    lipase-protease-amylase  36,000 Units Oral TID WC    sodium chloride  15 mL/kg IntraVENous Once    insulin glargine  0.25 Units/kg SubCUTAneous Nightly    budesonide  0.5 mg Nebulization BID    Arformoterol Tartrate  15 mcg Nebulization BID    sodium chloride flush  5-40 mL IntraVENous 2 times per day    folic acid  1 mg Oral Daily    enoxaparin  1 mg/kg SubCUTAneous BID    pantoprazole  40 mg Oral QAM AC    multivitamin  1 tablet Oral Daily    thiamine  100 mg IntraVENous Daily    nicotine  1 patch TransDERmal Daily       PRN Meds:   polyethylene glycol, 17 g, Daily PRN  HYDROcodone 5 mg - acetaminophen, 1 tablet, Q4H PRN   Or  HYDROcodone 5 mg - acetaminophen, 2 tablet, Q4H PRN  HYDROmorphone, 0.25 mg, Q3H PRN   Or  HYDROmorphone, 0.5 mg, Q3H PRN  dextrose bolus (hypoglycemia), 125 mL, PRN   Or  dextrose bolus (hypoglycemia), 250 mL, PRN  sodium chloride flush, 5-40 mL, PRN  sodium chloride, , PRN  LORazepam, 1 mg, Q1H PRN Or  LORazepam, 1 mg, Q1H PRN   Or  LORazepam, 2 mg, Q1H PRN   Or  LORazepam, 2 mg, Q1H PRN   Or  LORazepam, 3 mg, Q1H PRN   Or  LORazepam, 3 mg, Q1H PRN   Or  LORazepam, 4 mg, Q1H PRN   Or  LORazepam, 4 mg, Q1H PRN      Continuous Infusions:   sodium chloride      sodium chloride         Review of Systems  All systems reviewed. All negative except for symptoms mentioned in HPI     OBJECTIVE    /88   Pulse 92   Temp 98.1 °F (36.7 °C) (Oral)   Resp 16   Ht 5' 7\" (1.702 m)   Wt 180 lb (81.6 kg)   SpO2 100%   BMI 28.19 kg/m²     Intake/Output Summary (Last 24 hours) at 4/3/2022 1509  Last data filed at 4/3/2022 1356  Gross per 24 hour   Intake 5819.9 ml   Output 5000 ml   Net 819.9 ml       Physical examination:  General: awake alert, oriented x3  HEENT: normocephalic non traumatic, no exophthalmos   Neck: supple, No thyroid tenderness,  Pulm: good equal air entry no added sounds  CVS: S1 + S2  Abd: soft lax, no tenderness  Skin: warm, no lesions, no rash.  No open wounds, no ulcers   Neuro: CN intact, sensation decreased bilateral , muscle power normal  Psych: normal mood, and affect    Review of Laboratory Data:  I personally reviewed the following labs:   Recent Labs     04/02/22  0800 04/03/22  0630   WBC 5.7 2.0*   RBC 4.70 3.81   HGB 16.0 12.8   HCT 42.3 34.3*   MCV 90.0 90.0   MCH 34.0 33.6   MCHC 37.8* 37.3*   RDW 12.1 12.4   * 64*   MPV 9.2 8.7     Recent Labs     04/02/22  0800 04/02/22  0806 04/02/22  2200 04/03/22  0240 04/03/22  0630   *   < > 132 138 134   K 3.4*   < > 3.5 3.4* 3.4*   CL 92*   < > 99 106 100   CO2 11*   < > 13* 20* 21*   BUN 4*   < > <2* <2* <2*   CREATININE 0.7   < > 0.6* 0.6* 0.5*   GLUCOSE 221*   < > 256* 104* 254*   CALCIUM 9.2   < > 7.7* 7.8* 8.0*   PROT 7.3  --   --   --   --    LABALBU 4.7  --   --   --   --    BILITOT 0.6  --   --   --   --    ALKPHOS 110  --   --   --   --    AST 49*  --   --   --   --    ALT 81*  --   --   --   --     < > = values in this interval not displayed. Beta-Hydroxybutyrate   Date Value Ref Range Status   04/02/2022 >4.50 (H) 0.02 - 0.27 mmol/L Final   03/03/2021 0.27 0.02 - 0.27 mmol/L Final   03/12/2020 >4.50 (H) 0.02 - 0.27 mmol/L Final     Lab Results   Component Value Date    LABA1C 6.9 04/08/2021    LABA1C 5.3 10/27/2020    LABA1C 5.9 06/16/2020     Lab Results   Component Value Date/Time    TSH 2.970 04/02/2022 08:00 AM    T4FREE 1.28 02/02/2016 08:41 AM     Lab Results   Component Value Date    LABA1C 6.9 04/08/2021    GLUCOSE 254 04/03/2022    GLUCOSE 84 11/19/2011    MALBCR <30 03/25/2020    LABCREA 230 02/07/2016     Lab Results   Component Value Date    TRIG 271 03/25/2020    HDL 25 04/08/2021    LDLCALC 91 04/08/2021    CHOL 174 03/25/2020       Blood culture   Lab Results   Component Value Date    BC 5 Days- no growth 02/17/2016    BC Growth in anaerobic bottle only 02/07/2016       Radiology:  CTA PULMONARY W CONTRAST   Final Result   Addendum 1 of 1   ADDENDUM:   Critical results were called by Dr. Latoya Restrepo MD to Zina Farias on   4/2/2022 at 10:50. Final   1. Small nonocclusive segmental filling defects in the lower lobes   compatible with pulmonary pulmonary embolism. Evaluation of the lower lungs   is mildly compromised due to motion artifact. 2.  No pleural fluid or focal pneumonia. 3.  Diffuse hepatic steatosis. Mild splenomegaly. 4.  Peripherally calcified lesion in the pancreatic tail measuring 3.7 x 2.5   x 1.5 cm could represent an old calcified pseudocyst or neoplasm   (pseudopapillary versus mucinous). If there is no known history of   pancreatic disease, triple phase CT of the pancreas or MRI recommended. XR CHEST PORTABLE   Final Result   No acute process.          CTA TRIPHASIC PANCREAS    (Results Pending)       Medical Records/Labs/Images review:   I personally reviewed and summarized previous records   All labs and imaging were reviewed independently ASSESSMENT & PLAN   Beth Buckner, a 28 y.o.-old male seen today for inpatient diabetes management     Diabetes Mellitus type 2  · Likely due to underlying chronic pancreatitis from alcohol abuse    · For now, will change diabetes regimen to:  · Lantus 15u BID   · Humalog 6u with meals   · Medium dose sliding scale   · Continue glucose check with meals and at bedtime   · Will titrate insulin dose based on the blood glucose trend & insulin requirement  · Pt will follow with us after discharge. Endocrine follow up visit, Monday 4/11 at 1:30pm     Pancreatitis/ alcohol abuse   · management per primary and GI service   · With h/o Pancreatitis both GLP-1 agonist and DDP-4 inhibiotrs are contraindicated  · Discussed the risk of lactic acidosis with using Metformin and alcohol abuse       The above issues were reviewed with the patient who understood and agreed with the plan. Thank you for allowing us to participate in the care of this patient. Please do not hesitate to contact us with any additional questions. Ilya Hennessy MD  Endocrinologist, [facility]   [address] [place], [place] [id number]   Phone: [phone]  Fax: [phone]  --------------------------------------------  An electronic signature was used to authenticate this note.  Jasen Melendrez MD on 4/3/2022 at 3:09 PM

## 2022-04-03 NOTE — PLAN OF CARE
Problem: Pain:  Goal: Pain level will decrease  Description: Pain level will decrease  4/2/2022 1741 by Kenyetta Carolina RN  Outcome: Met This Shift  Goal: Control of acute pain  Description: Control of acute pain  Outcome: Met This Shift     Problem: Discharge Planning:  Goal: Participates in care planning  Description: Participates in care planning  4/3/2022 0108 by Justin Mills RN  Outcome: Met This Shift  4/2/2022 1741 by Kenyetta Carolina RN  Outcome: Met This Shift  Goal: Discharged to appropriate level of care  Description: Discharged to appropriate level of care  4/2/2022 1741 by Kenyetta Carolina RN  Outcome: Met This Shift     Problem: Airway Clearance - Ineffective:  Goal: Ability to maintain a clear airway will improve  Description: Ability to maintain a clear airway will improve  4/3/2022 0108 by Justin Mills RN  Outcome: Met This Shift  4/2/2022 1741 by Kenyetta Carolina RN  Outcome: Met This Shift     Problem: Anxiety/Stress:  Goal: Level of anxiety will decrease  Description: Level of anxiety will decrease  Outcome: Met This Shift     Problem: Pain:  Goal: Pain level will decrease  Description: Pain level will decrease  4/2/2022 1741 by Kenyetta Carolina RN  Outcome: Met This Shift     Problem: Fluid Volume - Imbalance:  Goal: Absence of imbalanced fluid volume signs and symptoms  Description: Absence of imbalanced fluid volume signs and symptoms  4/2/2022 1741 by Kenyetta Carolina RN  Outcome: Ongoing  Goal: Will remain free of signs and symptoms of dehydration  Description: Will remain free of signs and symptoms of dehydration  4/2/2022 1741 by Kenyetta Carolina RN  Outcome: Ongoing     Problem: Serum Glucose Level - Abnormal:  Goal: Ability to maintain appropriate glucose levels will improve to within specified parameters  Description: Ability to maintain appropriate glucose levels will improve to within specified parameters  4/2/2022 1741 by Kenyetta Carolina RN  Outcome: Ongoing  Goal: Ability to maintain appropriate glucose levels will improve  Description: Ability to maintain appropriate glucose levels will improve  4/2/2022 1741 by Prakash Willis RN  Outcome: Ongoing     Problem: Injury - Acid Base Imbalance:  Goal: Acid-base balance  Description: Acid-base balance  4/2/2022 1741 by Prakash Willis RN  Outcome: Ongoing

## 2022-04-03 NOTE — PATIENT CARE CONFERENCE
Intensive Care Daily Quality Rounding Checklist      ICU Team Members: Dr. Courtney Burciaga, resident, bedside nurse, charge nurse, RT    ICU Day #: NUMBER: 2    Intubation Date:  n/a    Ventilator Day #:     Central Line Insertion Date:  n/a        Day #:      Arterial Line Insertion Date:  n/a      Day #:     Temporary Hemodialysis Catheter Insertion Date:  n/a      Day #     DVT Prophylaxis: Lovenox    GI Prophylaxis: protonix    Ramos Catheter Insertion Date:  n/a       Day #:       Continued need (if yes, reason documented and discussed with physician):     Skin Issues/ Wounds and ordered treatment discussed on rounds:     Goals/ Plans for the Day: Daily labs and transfuse/replace as needed. Transition to home insulin regimen. Continue CIWA. Hold Lovenox today. Transfer to [facility].

## 2022-04-03 NOTE — CONSULTS
Gastroenterology Consult Note   CHUCK SantiagoC with Dimitris Hutchinson M.D. Consult Note        Date of Service: 4/3/2022  Reason for Consult:pancreatic mass/chronic pancreatitis   Requesting Physician: Dr. Francisco Breen:  Nausea and vomiting     History Obtained From:  Patient, EMR   HISTORY OF PRESENT ILLNESS:       Geovanna Garcia is a 28 y.o. male with significant past medical history of alcohol abuse, pancreatitis, anxiety, bipolar, asthma and DM admitted via ED for nausea and vomiting. Pt admitted with complaints of nausea and vomiting found to be in DKA and admitted to ICU for further management. Pt reports for approximately 5 days he had\" DKA symptoms\", chest pain radiating to back, nausea and multiple bouts of emesis, denies hematemesis. Unable to acknowledge precipitating factors. Prior to tolerating diet. No changes in medications or recent antibiotic use. Uses ibuprofen over-the-counter 1-2 times monthly as needed. Bowel movements normal no melena or hematochezia. Admits to daily use of alcohol approximately 1 pint of vodka daily. No prior endoscopic work-up. Admission labs ALT 81, AST 49, lipase 68, beta >4.5, glucose 221, sodium 131, potassium 3.4, chloride 92, , BUN 4, anion gap 28, lactic 2.6. CTA chest- 1. Small nonocclusive segmental filling defects in the lower lobes compatible with pulmonary pulmonary embolism. Evaluation of the lower lungs is mildly compromised due to motion artifact. 2.  No pleural fluid or focal pneumonia. 3.  Diffuse hepatic steatosis. Mild splenomegaly. 4.  Peripherally calcified lesion in the pancreatic tail measuring 3.7 x 2.5 x 1.5 cm could represent an old calcified pseudocyst or neoplasm (pseudopapillary versus mucinous). If there is no known history of pancreatic disease, triple phase CT of the pancreas or MRI recommended. Consultation for pancreatic mass/chronic pancreatitis.  Pt is known to Dr. Nate Diggs last seen in 2016 and was lost to follow. Currently, pt reports abdominal pain has subsided. Tolerating diet. Last BM Wednesday,+ flatus. Labs today WBC 2.0, platelet 64, potassium 3.4, BUN <2, creatinine 0.5, glucose 254.     Past Medical History:        Diagnosis Date    Alcohol abuse     Allergic rhinitis 3/9/2015    Anxiety     Asthma     Bipolar 2 disorder ([facility] [address] 3/9/2015    Depression     Diabetes mellitus ([facility] [address]     Generalized anxiety disorder 3/4/2016    With agoraphobia    High anion gap metabolic acidosis 4/96/7048    History of tobacco abuse 2/25/2016    Plantar fasciitis of left foot 3/3/2016    Prolonged INR 2/19/2016    Splenomegaly 3/4/2016    US 3/2016     Past Surgical History:        Procedure Laterality Date    BRONCHOSCOPY  02/16/2016     Current Medications:    Current Facility-Administered Medications: insulin lispro (HUMALOG) injection vial 0-12 Units, 0-12 Units, SubCUTAneous, TID WC  insulin lispro (HUMALOG) injection vial 0-6 Units, 0-6 Units, SubCUTAneous, Nightly  lipase-protease-amylase (CREON) delayed release capsule 36,000 Units, 36,000 Units, Oral, TID WC  polyethylene glycol (GLYCOLAX) packet 17 g, 17 g, Oral, Daily PRN  0.9 % sodium chloride bolus, 15 mL/kg, IntraVENous, Once **FOLLOWED BY** 0.9 % sodium chloride infusion, , IntraVENous, Continuous  insulin glargine (LANTUS) injection vial 25 Units, 0.25 Units/kg, SubCUTAneous, Nightly  HYDROcodone-acetaminophen (NORCO) 5-325 MG per tablet 1 tablet, 1 tablet, Oral, Q4H PRN **OR** HYDROcodone-acetaminophen (NORCO) 5-325 MG per tablet 2 tablet, 2 tablet, Oral, Q4H PRN  HYDROmorphone (DILAUDID) injection 0.25 mg, 0.25 mg, IntraVENous, Q3H PRN **OR** HYDROmorphone (DILAUDID) injection 0.5 mg, 0.5 mg, IntraVENous, Q3H PRN  budesonide (PULMICORT) nebulizer suspension 500 mcg, 0.5 mg, Nebulization, BID  Arformoterol Tartrate (BROVANA) nebulizer solution 15 mcg, 15 mcg, Nebulization, BID  dextrose bolus (hypoglycemia) 10% 125 mL, 125 mL, IntraVENous, PRN **OR** dextrose bolus (hypoglycemia) 10% 250 mL, 250 mL, IntraVENous, PRN  sodium chloride flush 0.9 % injection 5-40 mL, 5-40 mL, IntraVENous, 2 times per day  sodium chloride flush 0.9 % injection 5-40 mL, 5-40 mL, IntraVENous, PRN  0.9 % sodium chloride infusion, , IntraVENous, PRN  LORazepam (ATIVAN) tablet 1 mg, 1 mg, Oral, Q1H PRN **OR** LORazepam (ATIVAN) injection 1 mg, 1 mg, IntraVENous, Q1H PRN **OR** LORazepam (ATIVAN) tablet 2 mg, 2 mg, Oral, Q1H PRN **OR** LORazepam (ATIVAN) injection 2 mg, 2 mg, IntraVENous, Q1H PRN **OR** LORazepam (ATIVAN) tablet 3 mg, 3 mg, Oral, Q1H PRN **OR** LORazepam (ATIVAN) injection 3 mg, 3 mg, IntraVENous, Q1H PRN **OR** LORazepam (ATIVAN) tablet 4 mg, 4 mg, Oral, Q1H PRN **OR** LORazepam (ATIVAN) injection 4 mg, 4 mg, IntraVENous, E9L PRN  folic acid (FOLVITE) tablet 1 mg, 1 mg, Oral, Daily  [Held by provider] enoxaparin (LOVENOX) injection 80 mg, 1 mg/kg, SubCUTAneous, BID  pantoprazole (PROTONIX) tablet 40 mg, 40 mg, Oral, QAM AC  multivitamin 1 tablet, 1 tablet, Oral, Daily  thiamine (B-1) injection 100 mg, 100 mg, IntraVENous, Daily  nicotine (NICODERM CQ) 14 MG/24HR 1 patch, 1 patch, TransDERmal, Daily    Allergies:  Patient has no known allergies. Social History:    Tobacco:  Pt reports current half pack daily  Alcohol:  Pt reports current daily use approximately 1 pint daily  Illicit Drugs: Pt denies    Family History:   Denies significant family medical history    REVIEW OF SYSTEMS:    Aside from what was mentioned in the [address] and HPI, essentially unremarkable, all others negative.     PHYSICAL EXAM:      Vitals:    /79   Pulse 82   Temp 98.1 °F (36.7 °C) (Oral)   Resp 12   Ht 5' 7\" (1.702 m)   Wt 180 lb (81.6 kg)   SpO2 97%   BMI 28.19 kg/m²       CONSTITUTIONAL:  awake, alert, fatigued, cooperative, no apparent distress, and appears stated age  EYES:  pupils equal, round and reactive to light, sclera anicteric and conjunctiva normal  ENT:  normocephalic, oral pharynx with moist mucous membranes  NECK:  supple   HEMATOLOGIC/LYMPHATICS:  no cervical lymphadenopathy and no supraclavicular lymphadenopathy  LUNGS:  No increased work of breathing, good air exchange, clear to auscultation bilaterally.   CARDIOVASCULAR:  Normal apical impulse, regular rate and rhythm, no murmur noted; 2+ pulses; no edema  ABDOMEN:  normal bowel sounds, soft, non-distended, non-tender, no masses palpated, no hepatosplenomegally  MUSCULOSKELETAL:  full range of motion noted  motor strength is 5 out of 5 all extremities bilaterally  NEUROLOGIC:  Mental Status Exam:  Level of Alertness:   awake  Orientation:   person, place, time  Motor Exam:  Motor exam is symmetrical 5 out of 5 all extremities bilaterally  SKIN:  normal skin color, texture, turgor    DATA:    CBC with Differential:    Lab Results   Component Value Date    WBC 2.0 04/03/2022    RBC 3.81 04/03/2022    HGB 12.8 04/03/2022    HCT 34.3 04/03/2022    PLT 64 04/03/2022    MCV 90.0 04/03/2022    MCH 33.6 04/03/2022    MCHC 37.3 04/03/2022    RDW 12.4 04/03/2022    BANDSPCT 2 02/15/2016    LYMPHOPCT 37.2 04/03/2022    MONOPCT 15.8 04/03/2022    BASOPCT 0.5 04/03/2022    MONOSABS 0.31 04/03/2022    LYMPHSABS 0.73 04/03/2022    EOSABS 0.07 04/03/2022    BASOSABS 0.01 04/03/2022     CMP:    Lab Results   Component Value Date     04/03/2022    K 3.4 04/03/2022    K 3.4 04/02/2022     04/03/2022    CO2 21 04/03/2022    BUN <2 04/03/2022    CREATININE 0.5 04/03/2022    GFRAA >60 04/03/2022    LABGLOM >60 04/03/2022    GLUCOSE 254 04/03/2022    GLUCOSE 84 11/19/2011    PROT 7.3 04/02/2022    LABALBU 4.7 04/02/2022    LABALBU 4.6 11/19/2011    CALCIUM 8.0 04/03/2022    BILITOT 0.6 04/02/2022    ALKPHOS 110 04/02/2022    AST 49 04/02/2022    ALT 81 04/02/2022     Hepatic Function Panel:    Lab Results   Component Value Date    ALKPHOS 110 04/02/2022    ALT 81 04/02/2022    AST 49 04/02/2022    PROT 7.3 04/02/2022    BILITOT 0.6 04/02/2022    LABALBU 4.7 04/02/2022    LABALBU 4.6 11/19/2011     PT/INR:    Lab Results   Component Value Date    PROTIME 11.1 03/03/2021    INR 1.0 03/03/2021     PTT:    Lab Results   Component Value Date    APTT 28.2 02/20/2016   [APTT}  Last 3 Troponin:    Lab Results   Component Value Date    TROPONINI <0.01 02/01/2016     TSH:    Lab Results   Component Value Date    TSH 2.970 04/02/2022     VITAMIN B12:   Lab Results   Component Value Date    IQWTNUAU99 >2000 02/08/2016     FOLATE:    Lab Results   Component Value Date    FOLATE 8.1 02/08/2016     IRON:    Lab Results   Component Value Date    IRON 24 02/10/2016     Iron Saturation:    Lab Results   Component Value Date    LABIRON 27 02/10/2016     TIBC:    Lab Results   Component Value Date    TIBC 89 02/10/2016     FERRITIN:  No results found for: FERRITIN  HIV:  No results found for: HIV  BEATRIZ:    Lab Results   Component Value Date    BEATRIZ NEGATIVE 02/10/2016     No components found for: CHLPL    Lab Results   Component Value Date    TRIG 271 (H) 03/25/2020    TRIG 216 (H) 02/12/2016    TRIG 298 (H) 02/11/2016       Lab Results   Component Value Date    HDL 25 04/08/2021    HDL 28 03/25/2020       Lab Results   Component Value Date    LDLCALC 91 04/08/2021    LDLCALC 92 03/25/2020       Lab Results   Component Value Date    LABVLDL 24 04/08/2021    LABVLDL 54 03/25/2020        XR CHEST PORTABLE    Result Date: 4/2/2022  EXAMINATION: ONE XRAY VIEW OF THE CHEST 4/2/2022 8:26 am COMPARISON: 03/12/2020 HISTORY: ORDERING SYSTEM PROVIDED HISTORY: N/V TECHNOLOGIST PROVIDED HISTORY: Reason for exam:->N/V FINDINGS: The lungs are without acute focal process. There is no effusion or pneumothorax. The cardiomediastinal silhouette is without acute process. The osseous structures are without acute process. No acute process.      CTA PULMONARY W CONTRAST    Addendum Date: 4/2/2022    ADDENDUM: Critical results were called by Dr. Annie Sawant, MD tami Velasco on 4/2/2022 at 10:50. Result Date: 4/2/2022  EXAMINATION: CTA OF THE CHEST 4/2/2022 9:55 am TECHNIQUE: CTA of the chest was performed after the administration of intravenous contrast.  Multiplanar reformatted images are provided for review. MIP images are provided for review. Dose modulation, iterative reconstruction, and/or weight based adjustment of the mA/kV was utilized to reduce the radiation dose to as low as reasonably achievable. 3D MIP, MPR images were obtained of the chest. COMPARISON: CT abdomen dated 02/01/2016 HISTORY: ORDERING SYSTEM PROVIDED HISTORY: dimer 242; tachycardic, chest pain x4 days; evaluate for PE, if possible also eval for dissection TECHNOLOGIST PROVIDED HISTORY: Reason for exam:->dimer 242; tachycardic, chest pain x4 days; evaluate for PE, if possible also eval for dissection Decision Support Exception - unselect if not a suspected or confirmed emergency medical condition->Emergency Medical Condition (MA) FINDINGS: Pulmonary Arteries: Pulmonary arteries are adequately opacified. Linear nonocclusive thrombi are identified in segmental arteries in both lower lobes most visible in the right lower lobe on image number 136 series 4, left lower lobe on coronal images 86 through 91, series 602. There is mild lower lobe motion artifact. No large central pulmonary emboli detected. Upper lobe pulmonary arteries fill normally with contrast.  No evidence of alveolar consolidation or pleural fluid. Mediastinum: No evidence of mediastinal lymphadenopathy. The heart and pericardium demonstrate no acute abnormality. There is no acute abnormality of the thoracic aorta. No evidence of right heart strain. Lungs/pleura: The lungs are without acute process. No focal consolidation or pulmonary edema. No evidence of pleural effusion or pneumothorax. Upper Abdomen: Mild splenomegaly is noted. Splenic length is 13.7 cm.   Liver appears low in density which may represent hepatic steatosis, diffuse. There is a lobular peripherally calcified soft tissue density in the pancreatic tail which may represent calcified pseudocyst lesion measures 3.7 x 2.5 x 1.5 cm. There is central fluid density. No pancreatic ductal dilatation. The lesion is new compared to 02/01/2016. Please correlate with any available history of pancreatitis. Soft Tissues/Bones: No acute bone or soft tissue abnormality. 1.  Small nonocclusive segmental filling defects in the lower lobes compatible with pulmonary pulmonary embolism. Evaluation of the lower lungs is mildly compromised due to motion artifact. 2.  No pleural fluid or focal pneumonia. 3.  Diffuse hepatic steatosis. Mild splenomegaly. 4.  Peripherally calcified lesion in the pancreatic tail measuring 3.7 x 2.5 x 1.5 cm could represent an old calcified pseudocyst or neoplasm (pseudopapillary versus mucinous). If there is no known history of pancreatic disease, triple phase CT of the pancreas or MRI recommended. IMPRESSION:  · Pancreatic lesion, 3.7 cm questionable pseudocyst on CTA chest  · Pancreatitis, likely alcohol induced  · Transaminitis   · Alcohol abuse  · Hepatic steatosis   · Thrombocytopenia   · DKA- defer to ICU  · Pulmonary embolism- defer to ICU/PCP  · Hypokalemia     RECOMMENDATIONS:    · Critical care management per ICU, down graded today to GM  · Prior serology negative in 2016  · Repeat acute hepatitis panel   · Tumor markers ordered  · CT triphasic pancreas as ordered  · Protonix 40 mg daily  · Creon as ordered  · Lipid panel tomorrow am  · Lipase tomorrow am   · Alcohol cessation   · Diet as tolerated  · Anticoagulants per ICU/PCP  · CIWA per PCP  · Medical management per PCP to include IVF  · Medicate for pain or nausea or PCP  · Monitor labs daily  · Supportive care   · Continue to monitor     Note: This report was completed utilizing computer voice recognition software.  Every effort has been made to ensure accuracy, however; inadvertent computerized transcription errors may be present. Thank you very much for your consultation. We will follow closely with you.     Discussed with Dr. Matthew Heimlich developed by CHUCK Mills, NP-C 4/3/2022 11:29 AM for Dr. Debo Iglesias

## 2022-04-03 NOTE — PROGRESS NOTES
[facility]   [facility]    Admitting Date and Time: 4/2/2022  7:24 AM  Admit Dx: Splenomegaly [R16.1]  Pancreatic mass [K86.89]  DKA, type 1, not at goal [facility]) [E10.10]  Diabetic ketoacidosis without coma associated with diabetes mellitus due to underlying condition ([facility] [address] [E08.10]  Acute pulmonary embolism without acute cor pulmonale, unspecified [facility]ism type [facility] [I26.99]    Seen for follow on multiple problems as listed below. Subjective:  Continues with pleuritic CP , upper abdominal pain , but much better c/f yesterday. N/V has resolved. Denies sob. Seen in ICU.    ROS: denies fever, chills, cp, sob, n/v, HA unless stated above.      sodium chloride  15 mL/kg IntraVENous Once    insulin glargine  0.25 Units/kg SubCUTAneous Nightly    budesonide  0.5 mg Nebulization BID    Arformoterol Tartrate  15 mcg Nebulization BID    sodium chloride flush  5-40 mL IntraVENous 2 times per day    folic acid  1 mg Oral Daily    enoxaparin  1 mg/kg SubCUTAneous BID    pantoprazole  40 mg Oral QAM AC    multivitamin  1 tablet Oral Daily    thiamine  100 mg IntraVENous Daily    nicotine  1 patch TransDERmal Daily     potassium chloride, 10 mEq, PRN  magnesium sulfate, 1,000 mg, PRN  sodium phosphate IVPB, 10 mmol, PRN   Or  sodium phosphate IVPB, 15 mmol, PRN   Or  sodium phosphate IVPB, 20 mmol, PRN  polyethylene glycol, 17 g, Daily PRN  dextrose 5% and 0.45% NaCl with KCl 20 mEq, , Continuous PRN  HYDROcodone 5 mg - acetaminophen, 1 tablet, Q4H PRN   Or  HYDROcodone 5 mg - acetaminophen, 2 tablet, Q4H PRN  HYDROmorphone, 0.25 mg, Q3H PRN   Or  HYDROmorphone, 0.5 mg, Q3H PRN  potassium chloride, 10 mEq, PRN  magnesium sulfate, 1,000 mg, PRN  sodium phosphate IVPB, 10 mmol, PRN   Or  sodium phosphate IVPB, 15 mmol, PRN   Or  sodium phosphate IVPB, 20 mmol, PRN  dextrose 5 % and 0.45 % NaCl, , Continuous PRN  dextrose bolus (hypoglycemia), 125 mL, PRN   Or  dextrose bolus (hypoglycemia), 250 mL, PRN  sodium chloride flush, 5-40 mL, PRN  sodium chloride, , PRN  LORazepam, 1 mg, Q1H PRN   Or  LORazepam, 1 mg, Q1H PRN   Or  LORazepam, 2 mg, Q1H PRN   Or  LORazepam, 2 mg, Q1H PRN   Or  LORazepam, 3 mg, Q1H PRN   Or  LORazepam, 3 mg, Q1H PRN   Or  LORazepam, 4 mg, Q1H PRN   Or  LORazepam, 4 mg, Q1H PRN         Objective:    /79   Pulse 82   Temp 98.1 °F (36.7 °C) (Oral)   Resp 12   Ht 5' 7\" (1.702 m)   Wt 180 lb (81.6 kg)   SpO2 97%   BMI 28.19 kg/m²   General Appearance: alert and oriented to person, place and time, in no acute distress  Skin: warm and dry  Head: normocephalic and atraumatic  Eyes:  extraocular eye movements intact, conjunctivae normal  Neck: supple and non-tender without mass  Pulmonary/Chest: clear to auscultation bilaterally  Cardiovascular: normal rate, regular rhythm, normal S1 and S2  Abdomen: soft, non-tender, non-distended, normal bowel sounds, no masses or organomegaly  Extremities: no cyanosis, clubbing Neurologic: no cranial nerve deficit, speech normal      Recent Labs     04/02/22  2200 04/03/22  0240 04/03/22  0630    138 134   K 3.5 3.4* 3.4*   CL 99 106 100   CO2 13* 20* 21*   BUN <2* <2* <2*   CREATININE 0.6* 0.6* 0.5*   GLUCOSE 256* 104* 254*   CALCIUM 7.7* 7.8* 8.0*       Recent Labs     04/02/22  0800 04/03/22  0630   WBC 5.7 2.0*   RBC 4.70 3.81   HGB 16.0 12.8   HCT 42.3 34.3*   MCV 90.0 90.0   MCH 34.0 33.6   MCHC 37.8* 37.3*   RDW 12.1 12.4   * 64*   MPV 9.2 8.7       Labs and images reviewed . Radiology:   CTA PULMONARY W CONTRAST   Final Result   Addendum 1 of 1   ADDENDUM:   Critical results were called by Dr. Angely Fermin MD to Crissy Fleming on   4/2/2022 at 10:50. Final   1. Small nonocclusive segmental filling defects in the lower lobes   compatible with pulmonary pulmonary embolism. Evaluation of the lower lungs   is mildly compromised due to motion artifact.       2.  No pleural fluid or focal pneumonia. 3.  Diffuse hepatic steatosis. Mild splenomegaly. 4.  Peripherally calcified lesion in the pancreatic tail measuring 3.7 x 2.5   x 1.5 cm could represent an old calcified pseudocyst or neoplasm   (pseudopapillary versus mucinous). If there is no known history of   pancreatic disease, triple phase CT of the pancreas or MRI recommended. XR CHEST PORTABLE   Final Result   No acute process. Assessment:    Principal Problem:    DKA, type 1, not at goal [facility])  Active Problems:    History of alcohol abuse    History of pancreatitis    High anion gap metabolic acidosis    Essential hypertension    Elevated transaminase level    Pancreatic mass    Bilateral pulmonary embolism (HCC)  Resolved Problems:    * No resolved hospital problems. *      Plan:  DKA with underlying diabetes- treated with  IV insulin as per protocol- now  transitioned to Lantus plus sliding scale. Intensivist and endocrinologist following. Continue IV fluids and other supportive care. At home on Metformin with Humalog coverage. Will defer further treatment to endocrinology. Lactic acidosis, ketoacidosis, hyponatremia and hypokalemia-secondary to above , on IV fluids, replete as necessary. Pancreatic mass secondary to chronic pancreatitis. GI consulted and following. Borderline elevated lipase continue to monitor. Elevated transaminase- monitor , work up as per GI . Likely sec to etoh use. Bilateral lower lobe PE -started on Lovenox. Atypical chest pain-secondary to PE, treat supportively    History of alcohol use - monitor for withdrawals. On CIWA . Continue with thiamine, FA , mvi.     History of bipolar disorder    On Lovenox okay for DVT prophylaxis  Full code                  Electronically signed by Tracey Newton MD on 4/3/2022 at 8:04 AM

## 2022-04-03 NOTE — PLAN OF CARE
Problem: Pain:  Description: Pain management should include both nonpharmacologic and pharmacologic interventions.   Goal: Pain level will decrease  Description: Pain level will decrease  Outcome: Met This Shift  Goal: Control of acute pain  Description: Control of acute pain  Outcome: Met This Shift  Goal: Control of chronic pain  Description: Control of chronic pain  Outcome: Met This Shift     Problem: Discharge Planning:  Goal: Participates in care planning  Description: Participates in care planning  Outcome: Met This Shift  Goal: Discharged to appropriate level of care  Description: Discharged to appropriate level of care  Outcome: Met This Shift     Problem: Airway Clearance - Ineffective:  Goal: Ability to maintain a clear airway will improve  Description: Ability to maintain a clear airway will improve  Outcome: Met This Shift     Problem: Anxiety/Stress:  Goal: Level of anxiety will decrease  Description: Level of anxiety will decrease  Outcome: Met This Shift

## 2022-04-04 NOTE — PROGRESS NOTES
Nutrition Assessment     Type and Reason for Visit: Initial,Positive Nutrition Screen    Nutrition Recommendations/Plan: Continue Current Diet, Start Ensure HP BID    Nutrition Assessment:  Pt w/ DKA, note pancreatic mass 2/2 chronic pancreatitis. Hx DM/ETOH abuse. Will add ONS BID for nutritional support. Malnutrition Assessment:  Malnutrition Status: Insufficient data    Estimated Daily Nutrient Needs:  Energy (kcal): ; Weight Used for Energy Requirements:  Current     Protein (g):  (1.3-1.5); Weight Used for Protein Requirements:  Ideal        Fluid (ml/day): ; Weight Used for Fluid Requirements:         Nutrition Related Findings: I&Os WDL, no edema, abd WDL, K+ 3.1, hx ETOH abuse on CIWA protocol      Current Nutrition Therapies:    ADULT DIET; Regular; 4 carb choices (60 gm/meal)    Anthropometric Measures:  · Height: 5' 7\" (170.2 cm)  · Current Body Wt: 180 lb (81.6 kg) (4/2 bed)   · BMI: 28.2    Nutrition Diagnosis:   · No nutrition diagnosis at this time related to   as evidenced by      Nutrition Interventions:   Nutrition Education/Counseling:  Education initiated (discussed appetite/ONS w/ pt)   Coordination of Nutrition Care:  Continue to monitor while inpatient    Goals:  Pt to consume >75% of meals/ONS       Nutrition Monitoring and Evaluation:   Food/Nutrient Intake Outcomes:  Food and Nutrient Intake,Supplement Intake  Physical Signs/Symptoms Outcomes:  Biochemical Data,GI Status,Fluid Status or Edema,Nutrition Focused Physical Findings,Skin,Weight     Discharge Planning:     Too soon to determine     Electronically signed by Stefanie Kohler RD, LD on 4/4/22 at 12:23 PM EDT    Contact: [phone]

## 2022-04-04 NOTE — PROGRESS NOTES
PROGRESS NOTE        Patient Presents with/Seen in Consultation For      *pancreatic mass/chronic pancreatitis   CHIEF COMPLAINT:  Nausea and vomiting   Subjective:     Patient seen laying in bed in no apparent distress denies abdominal pain, nausea or vomiting. Tolerating diet. Wants to go home. Discussed CT findings with patient advised EUS patient wishing to proceed as outpatient. Review of Systems  Aside from what was mentioned in the PMH and HPI, essentially unremarkable, all others negative. Objective:     /85   Pulse 97   Temp 97.9 °F (36.6 °C) (Oral)   Resp 16   Ht 5' 7\" (1.702 m)   Wt 180 lb (81.6 kg)   SpO2 100%   BMI 28.19 kg/m²     General appearance: alert, awake, laying in bed, and cooperative  Eyes: conjunctiva pale, sclera anicteric. PERRL.   Lungs: clear to auscultation bilaterally  Heart: regular rate and rhythm, no murmur, 2+ pulses; no edema  Abdomen: soft, non-tender; bowel sounds normal; no masses,  no organomegaly  Extremities: extremities without edema  Pulses: 2+ and symmetric  Skin: Skin color, texture, turgor normal.   Neurologic: Grossly normal    insulin lispro (HUMALOG) injection vial 0-12 Units, TID WC  insulin lispro (HUMALOG) injection vial 0-6 Units, Nightly  lipase-protease-amylase (CREON) delayed release capsule 36,000 Units, TID WC  insulin glargine (LANTUS) injection vial 15 Units, BID  insulin lispro (HUMALOG) injection vial 6 Units, TID WC  glucose (GLUTOSE) 40 % oral gel 15 g, PRN  glucagon (rDNA) injection 1 mg, PRN  dextrose 5 % solution, PRN  dextrose bolus (hypoglycemia) 10% 125 mL, PRN   Or  dextrose bolus (hypoglycemia) 10% 250 mL, PRN  polyethylene glycol (GLYCOLAX) packet 17 g, Daily PRN  0.9 % sodium chloride bolus, Once   Followed by  0.9 % sodium chloride infusion, Continuous  HYDROcodone-acetaminophen (NORCO) 5-325 MG per tablet 1 tablet, Q4H PRN   Or  HYDROcodone-acetaminophen (NORCO) 5-325 MG per tablet 2 tablet, Q4H PRN  HYDROmorphone (DILAUDID) injection 0.25 mg, Q3H PRN   Or  HYDROmorphone (DILAUDID) injection 0.5 mg, Q3H PRN  budesonide (PULMICORT) nebulizer suspension 500 mcg, BID  Arformoterol Tartrate (BROVANA) nebulizer solution 15 mcg, BID  dextrose bolus (hypoglycemia) 10% 125 mL, PRN   Or  dextrose bolus (hypoglycemia) 10% 250 mL, PRN  sodium chloride flush 0.9 % injection 5-40 mL, 2 times per day  sodium chloride flush 0.9 % injection 5-40 mL, PRN  0.9 % sodium chloride infusion, PRN  LORazepam (ATIVAN) tablet 1 mg, Q1H PRN   Or  LORazepam (ATIVAN) injection 1 mg, Q1H PRN   Or  LORazepam (ATIVAN) tablet 2 mg, Q1H PRN   Or  LORazepam (ATIVAN) injection 2 mg, Q1H PRN   Or  LORazepam (ATIVAN) tablet 3 mg, Q1H PRN   Or  LORazepam (ATIVAN) injection 3 mg, Q1H PRN   Or  LORazepam (ATIVAN) tablet 4 mg, Q1H PRN   Or  LORazepam (ATIVAN) injection 4 mg, G7B PRN  folic acid (FOLVITE) tablet 1 mg, Daily  enoxaparin (LOVENOX) injection 80 mg, BID  pantoprazole (PROTONIX) tablet 40 mg, QAM AC  multivitamin 1 tablet, Daily  thiamine (B-1) injection 100 mg, Daily  nicotine (NICODERM CQ) 14 MG/24HR 1 patch, Daily         Data Review  CBC:   Lab Results   Component Value Date    WBC 1.5 04/04/2022    RBC 4.21 04/04/2022    HGB 14.1 04/04/2022    HCT 38.3 04/04/2022    MCV 91.0 04/04/2022    MCH 33.5 04/04/2022    MCHC 36.8 04/04/2022    RDW 12.4 04/04/2022    PLT 66 04/04/2022    MPV 9.2 04/04/2022     CMP:    Lab Results   Component Value Date     04/04/2022    K 3.1 04/04/2022    K 3.4 04/02/2022     04/04/2022    CO2 29 04/04/2022    BUN 5 04/04/2022    CREATININE 0.6 04/04/2022    GFRAA >60 04/04/2022    LABGLOM >60 04/04/2022    GLUCOSE 203 04/04/2022    GLUCOSE 84 11/19/2011    PROT 5.9 04/04/2022    LABALBU 3.9 04/04/2022    LABALBU 4.6 11/19/2011    CALCIUM 8.9 04/04/2022    BILITOT 0.4 04/04/2022    ALKPHOS 79 04/04/2022    AST 32 04/04/2022    ALT 48 04/04/2022     Hepatic Function Panel:    Lab Results   Component Value Date ALKPHOS 79 04/04/2022    ALT 48 04/04/2022    AST 32 04/04/2022    PROT 5.9 04/04/2022    BILITOT 0.4 04/04/2022    BILIDIR <0.2 04/04/2022    IBILI see below 04/04/2022    LABALBU 3.9 04/04/2022    LABALBU 4.6 11/19/2011     No components found for: CHLPL    Lab Results   Component Value Date    TRIG 271 (H) 03/25/2020    TRIG 216 (H) 02/12/2016    TRIG 298 (H) 02/11/2016       Lab Results   Component Value Date    HDL 32 04/04/2022    HDL 25 04/08/2021    HDL 28 03/25/2020       Lab Results   Component Value Date    LDLCALC 108 (H) 04/04/2022    LDLCALC 91 04/08/2021    LDLCALC 92 03/25/2020       Lab Results   Component Value Date    LABVLDL 37 04/04/2022    LABVLDL 24 04/08/2021    LABVLDL 54 03/25/2020      PT/INR:    Lab Results   Component Value Date    PROTIME 11.1 03/03/2021    INR 1.0 03/03/2021     IRON:    Lab Results   Component Value Date    IRON 24 02/10/2016     Iron Saturation:  No components found for: PERCENTFE  FERRITIN:  No results found for: FERRITIN      Assessment:     Active Problems:  ? Pancreatic tail lesion, 18 x 34 x 28 mm  ? Pancreatitis, likely alcohol induced  ? Transaminitis   ? Alcohol abuse  ? Hepatic steatosis   ? Thrombocytopenia   ? DKA- resolved  ? Diabetes- defer to PCP  ? Pulmonary embolism- defer to PCP  ? Hypokalemia      Plan:     ? Prior serology negative in 2016  ? Repeat acute hepatitis panel pending   ? Tumor markers pending   ? CT triphasic pancreas results noted, will need EUS to be arranged as outpatient  ? Protonix 40 mg daily  ? Creon as ordered  ? Alcohol cessation   ? Diet as tolerated  ? Anticoagulants per PCP  ? CIWA per PCP  ? Medical management per PCP to include IVF  ? Medicate for pain or nausea or PCP  ? Monitor labs daily  ? Supportive care   ? Continue to monitor       Note: This report was completed utilizing computer voice recognition software.  Every effort has been made to ensure accuracy, however; inadvertent computerized transcription errors may be present. Discussed with Dr. Nikunj Morrison per Dr. Trudy Thompson APRN-NP-C 4/4/2022  10:33 AM For Dr. Arelis Villaseñor. I HAD A FACE TO FACE ENCOUNTER WITH THE PATIENT. AGREE WITH THE EXAM, ASSESSMENT, AND PLAN AS OUTLINED ABOVE. ADDITION AND CORRECTIONS WERE DONE AS DEEMED APPROPRIATE. MY EXAM AND PLAN INCLUDE: CT TRIPHASIC PANCREAS RESULTS NOTED. PATIENT REQUESTING DISCHARGE. WILL AGREE WITH DISCHARGE AND EUS AS OUTPATIENT. ALSO OCTREOSCAN IS IN CONSIDERATION. WILL WAIT EUS RESULTS FIRST.

## 2022-04-04 NOTE — PROGRESS NOTES
[facility]   Progress Note    Admitting Date and Time: 4/2/2022  7:24 AM  Admit Dx: Splenomegaly [R16.1]  Pancreatic mass [K86.89]  DKA, type 1, not at goal [facility]) [E10.10]  Diabetic ketoacidosis without coma associated with diabetes mellitus due to underlying condition ([facility] [address] [E08.10]  Acute pulmonary embolism without acute cor pulmonale, unspecified pulmonary embolism type ([address] [I26.99]    Seen for follow on multiple problems as listed below. Subjective:  Has improving pleuritic CP , upper abdominal pain /n/v/has resolved. Denies SOB. Uneventful night. D/w nursing. On Medical floor. ROS: denies fever, chills, cp, sob, n/v, HA unless stated above.      insulin lispro  0-12 Units SubCUTAneous TID WC    insulin lispro  0-6 Units SubCUTAneous Nightly    lipase-protease-amylase  36,000 Units Oral TID WC    insulin glargine  15 Units SubCUTAneous BID    insulin lispro  6 Units SubCUTAneous TID WC    sodium chloride  15 mL/kg IntraVENous Once    budesonide  0.5 mg Nebulization BID    Arformoterol Tartrate  15 mcg Nebulization BID    sodium chloride flush  5-40 mL IntraVENous 2 times per day    folic acid  1 mg Oral Daily    enoxaparin  1 mg/kg SubCUTAneous BID    pantoprazole  40 mg Oral QAM AC    multivitamin  1 tablet Oral Daily    thiamine  100 mg IntraVENous Daily    nicotine  1 patch TransDERmal Daily     glucose, 15 g, PRN  glucagon (rDNA), 1 mg, PRN  dextrose, 100 mL/hr, PRN  dextrose bolus (hypoglycemia), 125 mL, PRN   Or  dextrose bolus (hypoglycemia), 250 mL, PRN  polyethylene glycol, 17 g, Daily PRN  HYDROcodone 5 mg - acetaminophen, 1 tablet, Q4H PRN   Or  HYDROcodone 5 mg - acetaminophen, 2 tablet, Q4H PRN  HYDROmorphone, 0.25 mg, Q3H PRN   Or  HYDROmorphone, 0.5 mg, Q3H PRN  dextrose bolus (hypoglycemia), 125 mL, PRN   Or  dextrose bolus (hypoglycemia), 250 mL, PRN  sodium chloride flush, 5-40 mL, PRN  sodium chloride, , PRN  LORazepam, 1 mg, Q1H PRN Or  LORazepam, 1 mg, Q1H PRN   Or  LORazepam, 2 mg, Q1H PRN   Or  LORazepam, 2 mg, Q1H PRN   Or  LORazepam, 3 mg, Q1H PRN   Or  LORazepam, 3 mg, Q1H PRN   Or  LORazepam, 4 mg, Q1H PRN   Or  LORazepam, 4 mg, Q1H PRN         Objective:    /75   Pulse 92   Temp 98.2 °F (36.8 °C) (Oral)   Resp 16   Ht 5' 7\" (1.702 m)   Wt 180 lb (81.6 kg)   SpO2 100%   BMI 28.19 kg/m²   General Appearance: alert and oriented to person, place and time, in no acute distress  Skin: warm and dry  Head: normocephalic and atraumatic  Eyes:  extraocular eye movements intact, conjunctivae normal  Neck: supple and non-tender without mass  Pulmonary/Chest: clear to auscultation bilaterally  Cardiovascular: normal rate, regular rhythm, normal S1 and S2  Abdomen: soft, non-tender, non-distended, normal bowel sounds, no masses or organomegaly  Extremities: no cyanosis, clubbing Neurologic: no cranial nerve deficit, speech normal      Recent Labs     04/03/22  0240 04/03/22  0630 04/04/22  0412    134 138   K 3.4* 3.4* 3.1*    100 100   CO2 20* 21* 29   BUN <2* <2* 5*   CREATININE 0.6* 0.5* 0.6*   GLUCOSE 104* 254* 203*   CALCIUM 7.8* 8.0* 8.9       Recent Labs     04/02/22  0800 04/03/22  0630 04/04/22  0412   WBC 5.7 2.0* 1.5*   RBC 4.70 3.81 4.21   HGB 16.0 12.8 14.1   HCT 42.3 34.3* 38.3   MCV 90.0 90.0 91.0   MCH 34.0 33.6 33.5   MCHC 37.8* 37.3* 36.8*   RDW 12.1 12.4 12.4   * 64* 66*   MPV 9.2 8.7 9.2       Labs and images reviewed . Radiology:   CTA TRIPHASIC PANCREAS   Preliminary Result   Located within the tail of the pancreas is a peripherally calcified area of   soft tissue density with heterogeneous enhancement. Multiple considerations   both benign and malignant with correlation of prior inflammation,   pancreatitis. In the absence of vascular etiology most highest consideration   is pancreatic neuroendocrine tumor versus serous cystadenoma.    Pseudopapillary neoplasm can be seen more likely in female population   demographic. RECOMMENDATIONS:   GI consultation recommended for further need of workup such as PET-CT and   biochemical values. CTA PULMONARY W CONTRAST   Final Result   Addendum 1 of 1   ADDENDUM:   Critical results were called by Dr. Marine Erickson MD to Elaine Enciso on   4/2/2022 at 10:50. Final   1. Small nonocclusive segmental filling defects in the lower lobes   compatible with pulmonary pulmonary embolism. Evaluation of the lower lungs   is mildly compromised due to motion artifact. 2.  No pleural fluid or focal pneumonia. 3.  Diffuse hepatic steatosis. Mild splenomegaly. 4.  Peripherally calcified lesion in the pancreatic tail measuring 3.7 x 2.5   x 1.5 cm could represent an old calcified pseudocyst or neoplasm   (pseudopapillary versus mucinous). If there is no known history of   pancreatic disease, triple phase CT of the pancreas or MRI recommended. XR CHEST PORTABLE   Final Result   No acute process. Assessment:    Principal Problem:    DKA, type 1, not at goal [facility])  Active Problems:    History of alcohol abuse    History of pancreatitis    High anion gap metabolic acidosis    Essential hypertension    Elevated transaminase level    Pancreatic mass    Bilateral pulmonary embolism (HCC)  Resolved Problems:    * No resolved hospital problems. *      Plan:  DKA II  with underlying diabetes- treated with  IV insulin as per protocol- now  transitioned to Lantus plus sliding scale. Intensivist and endocrinologist following. Continue IV fluids and other supportive care. At home on Metformin with Humalog coverage. Will defer further treatment to endocrinology. Currently on lantus 15 U sc bid, Humalog with meals & ISS. Titrate as necessary. Lactic acidosis, ketoacidosis, hyponatremia and hypokalemia-secondary to above , on IV fluids, replete as necessary. Pancreatic mass secondary to chronic pancreatitis.   GI consulted and following. Borderline elevated lipase continue to monitor. Appereciate GI input. Triphasic CT with calcified tail of pancreas with areas of soft tissue density with heterogenous enhancement. May need EUS  bx /work up. Will follow with gi. Elevated transaminase- monitor , work up as per GI . Likely sec to etoh use. As per GI serologies neg in 2016. repeat work up ordered. Will need out pt follow with gi. Bilateral lower lobe PE -started on Lovenox. Atypical chest pain-secondary to PE, treat supportively    History of alcohol use - monitor for withdrawals. On CIWA . Continue with thiamine, FA , mvi.     History of bipolar disorder    On Lovenox okay for DVT prophylaxis  Full code                  Electronically signed by Altagracia Little MD on 4/4/2022 at 7:20 AM

## 2022-04-04 NOTE — CONSULTS
Pulmonary Consultation    Admit Date: 4/2/2022  Requesting Physician: Esther Onofre MD    Reason for consultation:  · Possible pulmonary embolism  HPI:  · The patient is a 80-year-old diabetic male who presented to the hospital with chest pain. The pain was described as associated with palpitations and was in the center of his chest.  It is also associated with shortness of breath. The pain itself would change with changes in position. The patient notes he is a smoker and has been coughing some and wheezing. · Initially, the patient felt her symptoms were secondary to DKA. He presented to the hospital with that in mind. Seen and evaluated, because of the pain, the patient underwent a CT-a of the chest.  It was read as having a small pulmonary embolism. · The patient denies any pleuritic type chest pain, sharp in associate with deep inspiration, and he denies any hemoptysis. There is no swelling of the lower extremities and there is no asymmetry of the lower extremities. There is no pain in the lower extremities. D-dimer is normal at 240. PMH:    Past Medical History:   Diagnosis Date    Alcohol abuse     Allergic rhinitis 3/9/2015    Anxiety     Asthma     Bipolar 2 disorder ([facility] [address] 3/9/2015    Depression     Diabetes mellitus ([facility] [address]     Generalized anxiety disorder 3/4/2016    With agoraphobia    High anion gap metabolic acidosis 4/48/7064    History of tobacco abuse 2/25/2016    Plantar fasciitis of left foot 3/3/2016    Prolonged INR 2/19/2016    Splenomegaly 3/4/2016    US 3/2016     PSH:   Past Surgical History:   Procedure Laterality Date    BRONCHOSCOPY  02/16/2016       Review of Systems:   · Constitutional: As noted in the HPI. · Eyes: No visual changes or diplopia. No scleral icterus. · ENT: No headaches, hearing loss or vertigo. No nasal congestion, or sore throat. · Cardiovascular: No chest pain, dyspnea on exertion, or palpitations.   · Respiratory: See above  · Gastrointestinal: No abdominal pain, nausea or emesis. No diarrhea or rectal bleeding or melena. No change in bowel habits. · Genitourinary: No dysuria, urinary frequency, or incontinence. No hematuria. · Musculoskeletal: No gait disturbance, weakness or joint complaints. · Integumentary: No rash or pruritis. No abnormal pigmentation, hair or nail changes. · Neurological: No headache, diplopia, dizziness, tremor, change in muscle strength, numbness or tingling. No change in gait, balance, coordination, mood, affect, memory, mentation, behavior. · Psychiatric: No anxiety or depression. · Endocrine: No temperature intolerance, excessive thirst, fluid intake, urinary frequency, excessive appetite, or recent weight change. · Hematologic/Lymphatic: No abnormal bruising or bleeding, blood clots or swollen lymph nodes. No anemia, fever, chills, night sweats, or swollen glands. · Allergic/Immunologic: No seasonal or perenial allergies. No history of hives or atopic dermatitis.     Social History:  · Alcohol:  No  · Tobacco:   Ongoing  · Employment:  no silica or asbestos exposure  · Family:  No family history of lung disease    Medications:   dextrose      sodium chloride      sodium chloride        insulin lispro  0-12 Units SubCUTAneous TID WC    insulin lispro  0-6 Units SubCUTAneous Nightly    lipase-protease-amylase  36,000 Units Oral TID WC    insulin glargine  15 Units SubCUTAneous BID    insulin lispro  6 Units SubCUTAneous TID WC    sodium chloride  15 mL/kg IntraVENous Once    budesonide  0.5 mg Nebulization BID    Arformoterol Tartrate  15 mcg Nebulization BID    sodium chloride flush  5-40 mL IntraVENous 2 times per day    folic acid  1 mg Oral Daily    enoxaparin  1 mg/kg SubCUTAneous BID    pantoprazole  40 mg Oral QAM AC    multivitamin  1 tablet Oral Daily    thiamine  100 mg IntraVENous Daily    nicotine  1 patch TransDERmal Daily       Vitals:  Tmax:  VITALS:  BP 120/74   Pulse 95   Temp 97.9 °F (36.6 °C) (Oral)   Resp 16   Ht 5' 7\" (1.702 m)   Wt 180 lb (81.6 kg)   SpO2 100%   BMI 28.19 kg/m²   24HR INTAKE/OUTPUT:  No intake or output data in the 24 hours ending 22 1452  CURRENT PULSE OXIMETRY:  SpO2: 100 %  24HR PULSE OXIMETRY RANGE:  SpO2  Av.7 %  Min: 99 %  Max: 100 %    EXAM:  General: No distress. Alert. Eyes: PERRL. No sclera icterus. No conjunctival injection. ENT: No discharge. Pharynx clear. Neck: Trachea midline. Normal thyroid. No jvd, no hjr. Resp: Diffuse wheezing. No accessory muscle use. No rales. No rhonchi. CV: Regular rate. Regular rhythm. No murmur No rub. Abd: Non-tender. Non-distended. No masses. No organmegaly. Normal bowel sounds. Skin: Warm and dry. No nodule on exposed extremities. No rash on exposed extremities. Lymph: No cervical LAD. No supraclavicular LAD. Ext: No joint deformity. No clubbing. No cyanosis. No edema  Neuro: Awake. Follows commands. Positive pupils/gag/corneals. Normal pain response. Lab Results:  CBC:   Recent Labs     22  0800 22  0630 22  0412   WBC 5.7 2.0* 1.5*   HGB 16.0 12.8 14.1   HCT 42.3 34.3* 38.3   MCV 90.0 90.0 91.0   * 64* 66*       BMP:  Recent Labs     22  0240 22  0630 22  0412    134 138   K 3.4* 3.4* 3.1*    100 100   CO2 20* 21* 29   PHOS 2.0* 3.4 3.6   BUN <2* <2* 5*   CREATININE 0.6* 0.5* 0.6*    ALB:3,BILIDIR:3,BILITOT:3,ALKPHOS:3)@    PT/INR: No results for input(s): PROTIME, INR in the last 72 hours. Cultures:  Sputum: not available  Blood: not available    ABG:   No results for input(s): PH, PO2, PCO2, HCO3, BE, O2SAT, METHB, O2HB, COHB, O2CON, HHB, THB in the last 72 hours. Films:     CTA TRIPHASIC PANCREAS   Preliminary Result   Located within the tail of the pancreas is a peripherally calcified area of   soft tissue density with heterogeneous enhancement.   Multiple considerations   both benign and malignant with correlation of prior inflammation,   pancreatitis. In the absence of vascular etiology most highest consideration   is pancreatic neuroendocrine tumor versus serous cystadenoma. Pseudopapillary neoplasm can be seen more likely in female population   demographic. RECOMMENDATIONS:   GI consultation recommended for further need of workup such as PET-CT and   biochemical values. CTA PULMONARY W CONTRAST   Final Result   Addendum 1 of 1   ADDENDUM:   Critical results were called by Dr. Praveen Ross MD to Fanny oY on   4/2/2022 at 10:50. Final   1. Small nonocclusive segmental filling defects in the lower lobes   compatible with pulmonary pulmonary embolism. Evaluation of the lower lungs   is mildly compromised due to motion artifact. 2.  No pleural fluid or focal pneumonia. 3.  Diffuse hepatic steatosis. Mild splenomegaly. 4.  Peripherally calcified lesion in the pancreatic tail measuring 3.7 x 2.5   x 1.5 cm could represent an old calcified pseudocyst or neoplasm   (pseudopapillary versus mucinous). If there is no known history of   pancreatic disease, triple phase CT of the pancreas or MRI recommended. XR CHEST PORTABLE   Final Result   No acute process. .        Assessment:  1. Abnormal CT scan of the chest: Findings are not typical of pulmonary embolism as seen above with no significant occlusion seen. D-dimer is noted to be low. Lower extremities are unremarkable and the patient's Wells score is 0. Findings are all not consistent with pulmonary embolism. Plan:  1. Check ultrasounds of lower extremities  2. Repeat D-dimer  3. Consideration for discontinuance of anticoagulation      Thanks for letting us see this patient in consultation. Total time in reviewing the previous admissions and records, reviewing the current x-rays, labs, and discussing with clinical staff including nursing and physicians, exceeded 50 minutes. Please contact us with any questions. Office [phone] or after hours through [employer] x [phone]. Please note that voice recognition technology was used (while wearing a Covid mandated mask) in the preparation of this note and make therefore it may contain inadvertent transcription errors. If the patient is a COVID 19 isolation patient, the above physical exam reflects that of the examining physician for the day. Brooke Ochoa MD,  MDAVION., F.C.C.P.     [facility] [address], [address] [place] [address]

## 2022-04-04 NOTE — PLAN OF CARE
1856 by Emelyn Jasso RN  Outcome: Met This Shift  Goal: Will remain free of signs and symptoms of dehydration  Description: Will remain free of signs and symptoms of dehydration  4/3/2022 1856 by Emelyn Jasso RN  Outcome: Met This Shift     Problem: Gas Exchange - Impaired:  Goal: Levels of oxygenation will improve  Description: Levels of oxygenation will improve  4/3/2022 1856 by Emelyn Jasso RN  Outcome: Met This Shift     Problem: Mental Status - Impaired:  Goal: Mental status will be restored to baseline  Description: Mental status will be restored to baseline  4/3/2022 1856 by Emelyn Jasso RN  Outcome: Met This Shift     Problem: Nutrition Deficit:  Goal: Ability to achieve adequate nutritional intake will improve  Description: Ability to achieve adequate nutritional intake will improve  4/3/2022 1856 by Emelyn Jasso RN  Outcome: Met This Shift     Problem: Pain:  Goal: Pain level will decrease  Description: Pain level will decrease  4/3/2022 1856 by Emelyn Jasso RN  Outcome: Met This Shift  Goal: Recognizes and communicates pain  Description: Recognizes and communicates pain  4/3/2022 1856 by Emelyn Jasso RN  Outcome: Met This Shift  Goal: Control of acute pain  Description: Control of acute pain  4/3/2022 1856 by Emelyn Jasso RN  Outcome: Met This Shift  Goal: Control of chronic pain  Description: Control of chronic pain  4/3/2022 1856 by Emelyn Jasso RN  Outcome: Met This Shift     Problem: Serum Glucose Level - Abnormal:  Goal: Ability to maintain appropriate glucose levels will improve to within specified parameters  Description: Ability to maintain appropriate glucose levels will improve to within specified parameters  4/3/2022 1856 by Emelyn Jasso RN  Outcome: Met This Shift  Goal: Ability to maintain appropriate glucose levels will improve  Description: Ability to maintain appropriate glucose levels will improve  4/3/2022 1856 by Emelyn Jasso RN  Outcome: Met This Shift Problem: Skin Integrity - Impaired:  Goal: Will show no infection signs and symptoms  Description: Will show no infection signs and symptoms  4/3/2022 1856 by Wero Dutton RN  Outcome: Met This Shift  Goal: Absence of new skin breakdown  Description: Absence of new skin breakdown  4/3/2022 1856 by Wero Dutton RN  Outcome: Met This Shift     Problem: Sleep Pattern Disturbance:  Goal: Appears well-rested  Description: Appears well-rested  4/3/2022 1856 by Wero Dutton RN  Outcome: Met This Shift     Problem: Tissue Perfusion, Altered:  Goal: Circulatory function within specified parameters  Description: Circulatory function within specified parameters  4/3/2022 1856 by Wero Dutton RN  Outcome: Met This Shift     Problem: Tissue Perfusion - Cardiopulmonary, Altered:  Goal: Absence of angina  Description: Absence of angina  4/3/2022 1856 by Wero Dutton RN  Outcome: Met This Shift  Goal: Hemodynamic stability will improve  Description: Hemodynamic stability will improve  4/3/2022 1856 by Wero Dutton RN  Outcome: Met This Shift     Problem: Injury - Acid Base Imbalance:  Goal: Acid-base balance  Description: Acid-base balance  4/4/2022 0135 by Cely Chavez RN  Outcome: Met This Shift  4/3/2022 1856 by Wero Dutton RN  Outcome: Met This Shift     Problem: Skin Integrity:  Goal: Will show no infection signs and symptoms  Description: Will show no infection signs and symptoms  4/4/2022 0135 by Cely Chavez RN  Outcome: Met This Shift  4/3/2022 1856 by Wero Dutton RN  Outcome: Met This Shift  Goal: Absence of new skin breakdown  Description: Absence of new skin breakdown  4/4/2022 0135 by Cely Chavez RN  Outcome: Met This Shift  4/3/2022 1856 by Wero Dutton RN  Outcome: Met This Shift     Problem: Anxiety:  Goal: Level of anxiety will decrease  Description: Level of anxiety will decrease  4/4/2022 0135 by Cely Chavez RN  Outcome: Met This Shift  4/3/2022 1856 by Kelsie Saavedra, RN  Outcome: Met This Shift

## 2022-04-04 NOTE — PROGRESS NOTES
ENDOCRINOLOGY PROGRESS NOTE      Date of admission: 4/2/2022  Date of service: 4/4/2022  Admitting physician: Edda Oshea DO   Primary Care Physician: Karin Retana MD  Consultant physician: Estiven Bo MD     Reason for the consultation:  Uncontrolled DM    History of Present Illness: The history is provided by the patient. Accuracy of the patient data is excellent    Francesco Roman is a very pleasant 28 y.o. old male with PMH of poorly controlled DM, pancreatitis, alcohol abuse, and other  listed below admitted to [facility] on 4/2/2022 because of nausea and vomiting and chest pain and found to be in DKA, endocrine service was consulted for diabetes management.      Subjective   Seen and examined, feels better     Point of care glucose monitoring   (Independently reviewed)   Recent Labs     04/03/22  0759 04/03/22  0854 04/03/22  1144 04/03/22  1616 04/03/22  2228 04/04/22  0627 04/04/22  1207 04/04/22  1618   GLUMET 295* 223* 186* 232* 180* 174* 219* 120*     Scheduled Meds:   insulin glargine  18 Units SubCUTAneous BID    insulin lispro  10 Units SubCUTAneous TID WC    insulin lispro  0-12 Units SubCUTAneous TID WC    insulin lispro  0-6 Units SubCUTAneous Nightly    lipase-protease-amylase  36,000 Units Oral TID WC    sodium chloride  15 mL/kg IntraVENous Once    budesonide  0.5 mg Nebulization BID    Arformoterol Tartrate  15 mcg Nebulization BID    sodium chloride flush  5-40 mL IntraVENous 2 times per day    folic acid  1 mg Oral Daily    enoxaparin  1 mg/kg SubCUTAneous BID    pantoprazole  40 mg Oral QAM AC    multivitamin  1 tablet Oral Daily    thiamine  100 mg IntraVENous Daily    nicotine  1 patch TransDERmal Daily       PRN Meds:   glucose, 15 g, PRN  glucagon (rDNA), 1 mg, PRN  dextrose, 100 mL/hr, PRN  dextrose bolus (hypoglycemia), 125 mL, PRN   Or  dextrose bolus (hypoglycemia), 250 mL, PRN  polyethylene glycol, 17 g, Daily PRN  HYDROcodone 5 mg - acetaminophen, 1 tablet, Q4H PRN   Or  HYDROcodone 5 mg - acetaminophen, 2 tablet, Q4H PRN  HYDROmorphone, 0.25 mg, Q3H PRN   Or  HYDROmorphone, 0.5 mg, Q3H PRN  dextrose bolus (hypoglycemia), 125 mL, PRN   Or  dextrose bolus (hypoglycemia), 250 mL, PRN  sodium chloride flush, 5-40 mL, PRN  sodium chloride, , PRN  LORazepam, 1 mg, Q1H PRN   Or  LORazepam, 1 mg, Q1H PRN   Or  LORazepam, 2 mg, Q1H PRN   Or  LORazepam, 2 mg, Q1H PRN   Or  LORazepam, 3 mg, Q1H PRN   Or  LORazepam, 3 mg, Q1H PRN   Or  LORazepam, 4 mg, Q1H PRN   Or  LORazepam, 4 mg, Q1H PRN      Continuous Infusions:   dextrose      sodium chloride      sodium chloride         Review of Systems  All systems reviewed. All negative except for symptoms mentioned in HPI     OBJECTIVE    /74   Pulse 95   Temp 97.9 °F (36.6 °C) (Oral)   Resp 16   Ht 5' 7\" (1.702 m)   Wt 180 lb (81.6 kg)   SpO2 100%   BMI 28.19 kg/m²   No intake or output data in the 24 hours ending 04/04/22 1857    Physical examination:  General: awake alert, oriented x3  HEENT: normocephalic non traumatic, no exophthalmos   Neck: supple, No thyroid tenderness,  Pulm: good equal air entry no added sounds  CVS: S1 + S2  Abd: soft lax, no tenderness  Skin: warm, no lesions, no rash.  No open wounds, no ulcers   Neuro: CN intact, sensation decreased bilateral , muscle power normal  Psych: normal mood, and affect    Review of Laboratory Data:  I personally reviewed the following labs:   Recent Labs     04/02/22  0800 04/03/22  0630 04/04/22  0412   WBC 5.7 2.0* 1.5*   RBC 4.70 3.81 4.21   HGB 16.0 12.8 14.1   HCT 42.3 34.3* 38.3   MCV 90.0 90.0 91.0   MCH 34.0 33.6 33.5   MCHC 37.8* 37.3* 36.8*   RDW 12.1 12.4 12.4   * 64* 66*   MPV 9.2 8.7 9.2     Recent Labs     04/02/22  0800 04/02/22  0806 04/03/22  0240 04/03/22  0630 04/04/22  0412   *   < > 138 134 138   K 3.4*   < > 3.4* 3.4* 3.1*   CL 92*   < > 106 100 100   CO2 11*   < > 20* 21* 29   BUN 4*   < > <2* <2* 5*   CREATININE 0.7   < > 0.6* 0.5* 0.6*   GLUCOSE 221*   < > 104* 254* 203*   CALCIUM 9.2   < > 7.8* 8.0* 8.9   PROT 7.3  --   --   --  5.9*   LABALBU 4.7  --   --   --  3.9   BILITOT 0.6  --   --   --  0.4   ALKPHOS 110  --   --   --  79   AST 49*  --   --   --  32   ALT 81*  --   --   --  48*    < > = values in this interval not displayed. Beta-Hydroxybutyrate   Date Value Ref Range Status   04/02/2022 >4.50 (H) 0.02 - 0.27 mmol/L Final   03/03/2021 0.27 0.02 - 0.27 mmol/L Final   03/12/2020 >4.50 (H) 0.02 - 0.27 mmol/L Final     Lab Results   Component Value Date    LABA1C 6.9 04/08/2021    LABA1C 5.3 10/27/2020    LABA1C 5.9 06/16/2020     Lab Results   Component Value Date/Time    TSH 2.970 04/02/2022 08:00 AM    T4FREE 1.28 02/02/2016 08:41 AM     Lab Results   Component Value Date    LABA1C 6.9 04/08/2021    GLUCOSE 203 04/04/2022    GLUCOSE 84 11/19/2011    MALBCR <30 03/25/2020    LABCREA 230 02/07/2016     Lab Results   Component Value Date    TRIG 271 03/25/2020    HDL 32 04/04/2022    LDLCALC 108 04/04/2022    CHOL 174 03/25/2020       Blood culture   Lab Results   Component Value Date    BC 5 Days- no growth 02/17/2016    BC Growth in anaerobic bottle only 02/07/2016       Radiology:  CTA TRIPHASIC PANCREAS   Preliminary Result   Located within the tail of the pancreas is a peripherally calcified area of   soft tissue density with heterogeneous enhancement. Multiple considerations   both benign and malignant with correlation of prior inflammation,   pancreatitis. In the absence of vascular etiology most highest consideration   is pancreatic neuroendocrine tumor versus serous cystadenoma. Pseudopapillary neoplasm can be seen more likely in female population   demographic. RECOMMENDATIONS:   GI consultation recommended for further need of workup such as PET-CT and   biochemical values.          CTA PULMONARY W CONTRAST   Final Result   Addendum 1 of 1   ADDENDUM:   Critical results were called by Dr. Vega Hernandez [facility] on   4/2/2022 at 10:50. Final   1. Small nonocclusive segmental filling defects in the lower lobes   compatible with pulmonary pulmonary embolism. Evaluation of the lower lungs   is mildly compromised due to motion artifact. 2.  No pleural fluid or focal pneumonia. 3.  Diffuse hepatic steatosis. Mild splenomegaly. 4.  Peripherally calcified lesion in the pancreatic tail measuring 3.7 x 2.5   x 1.5 cm could represent an old calcified pseudocyst or neoplasm   (pseudopapillary versus mucinous). If there is no known history of   pancreatic disease, triple phase CT of the pancreas or MRI recommended. XR CHEST PORTABLE   Final Result   No acute process. US DUP LOWER EXTREMITIES BILATERAL VENOUS    (Results Pending)       Medical Records/Labs/Images review:   I personally reviewed and summarized previous records   All labs and imaging were reviewed independently     [address] [place] [address], a 28 y.o.-old male seen today for inpatient diabetes management     Diabetes Mellitus type 2  · Likely due to underlying chronic pancreatitis from alcohol abuse    · will change diabetes regimen to:  · Lantus 18u BID   · Humalog 10u with meals   · Medium dose sliding scale   · Continue glucose check with meals and at bedtime   · Will titrate insulin dose based on the blood glucose trend & insulin requirement  · Pt will follow with us after discharge. Endocrine follow up visit, Monday 4/11 at 1:30pm     Pancreatitis/ alcohol abuse   · management per primary and GI service   · With h/o Pancreatitis both GLP-1 agonist and DDP-4 inhibiotrs are contraindicated  · Discussed the risk of lactic acidosis with using Metformin and alcohol abuse       The above issues were reviewed with the patient who understood and agreed with the plan. Thank you for allowing us to participate in the care of this patient.  Please do not hesitate to contact us with any additional questions. Melina Hodgkins MD  Endocrinologist, [facility] Diabetes Care and Endocrinology   [address] [facility] [address]   Phone: [phone]  Fax: [phone]  --------------------------------------------  An electronic signature was used to authenticate this note.  Ladan Wynn MD on 4/4/2022 at 6:57 PM

## 2022-04-04 NOTE — PLAN OF CARE
Problem: Pain:  Goal: Pain level will decrease  Description: Pain level will decrease  Outcome: Met This Shift     Problem: Pain:  Goal: Control of acute pain  Description: Control of acute pain  Outcome: Met This Shift     Problem: Discharge Planning:  Goal: Participates in care planning  Description: Participates in care planning  Outcome: Met This Shift     Problem: Discharge Planning:  Goal: Discharged to appropriate level of care  Description: Discharged to appropriate level of care  Outcome: Met This Shift     Problem: Airway Clearance - Ineffective:  Goal: Ability to maintain a clear airway will improve  Description: Ability to maintain a clear airway will improve  Outcome: Met This Shift     Problem: Anxiety/Stress:  Goal: Level of anxiety will decrease  Description: Level of anxiety will decrease  4/4/2022 1307 by Myrna Price RN  Outcome: Met This Shift     Problem: Cardiac Output - Decreased:  Goal: Hemodynamic stability will improve  Description: Hemodynamic stability will improve  Outcome: Met This Shift     Problem: Skin Integrity - Impaired:  Goal: Absence of new skin breakdown  Description: Absence of new skin breakdown  Outcome: Met This Shift     Problem: Sleep Pattern Disturbance:  Goal: Appears well-rested  Description: Appears well-rested  Outcome: Met This Shift     Problem: Serum Glucose Level - Abnormal:  Goal: Ability to maintain appropriate glucose levels will improve  Description: Ability to maintain appropriate glucose levels will improve  Outcome: Ongoing

## 2022-04-05 NOTE — PROGRESS NOTES
Left message with Diabetes Education. Awaiting return call.  Electronically signed by Carie Forrest RN on 4/5/2022 at 8:48 AM

## 2022-04-05 NOTE — CARE COORDINATION
Met with patient about diagnosis and discharge plan of care. Pt lives alone in ranch home. Has hx of insulin in past and knows how to use it. Has glucometer and testing strips. Will need script for new insulin and needles. Pt has appt with endocrinology on 4/11.  Has no PCP, preservice line added to discharge AVS. No other needs for home-mjo

## 2022-04-05 NOTE — PROGRESS NOTES
Diabetes Education    Reason for consult: new to insulin    A1C:  6.9% (4/8/2021)     [x] Not available                     Patient states the following concerns/barriers to diabetes self-management:     [x] None       [] Medication cost   [] Food cost/availability        [] Reading  [] Hearing   [] Vision                [] Work    [] Transportation  [] No insurance  [] Physical limitations    [] Other:        Patient states the following about their diabetes/health:   [x] Patient had diabetes under control after diagnosis, stopped taking his metformin and stopped paying attention to his diet/exercise habits     [x] Patient is familiar with using insulin and knows how to give injections and monitor blood glucose with meter  [x] Patient does not drink any sweetened beverages, states he only drinks water  [x] Patient aware of what foods contain carbohydrates and how carbohydrates impact blood glucose  [x] Patient states he started to feel like he felt 2 years ago when he was in DKA before coming to the hospital         Diabetes survival packet provided to:   [x] Patient     [] Other:    Information reviewed:   Definition of diabetes   Target glucose ranges/A1C   Self-monitoring of blood glucose   Prevention/symptoms/treatment of hypo-/hyperglycemia   Medication adherence   The plate method/meal planning guidelines   The benefits of exercise and recommendations   Reducing the risk of chronic complications      Diabetes medications reviewed (use, purpose, action): metformin, Lantus, Humalog            Post-education Assessment  [x]  Attentive to teaching  [x]  Answered questions appropriately when asked   [x]  Seems able to apply concepts to daily lifestyle  [x]  Seems motivated to do well  [x]  Verbalized an understanding of the plate method for portion control   [x]  Verbalized an understanding of prescribed antidiabetes medications   [x]  Verbalized an understanding of target glucose ranges/A1C level  [x]  Expresses an intent to comply with treatment plan   []  Showed very little interest in complying with treatment plan   []  Seems to have trouble applying concepts to daily lifestyle       COMMENTS:  Reviewed survival guide with patient and patient was receptive to education. Reviewed possible injection sites for insulin. Reviewed importance of a balanced diet and spacing meals/insulin evenly throughout the day. Patient interested in meeting outpatient with a dietitian for a meal plan with consistent carbohydrates. Will call outpatient department with any questions. Recommendations:   [x] Carbohydrate-controlled diet    [] Script for glucometer and supplies (per preference of patient's insurance)  [x] Script for insulin pens and pen needles (if insulin is ordered at discharge for home use)   [] Consult to social work: patient has no insurance or has financial hardship  [] Inpatient consult to endocrinologist   [x] Follow up with endocrinologist as an outpatient   [] Home healthcare nursing to increase compliance to treatment plan   [x] Script for outpatient diabetes education classes (from doctor)        Thank you for this consult.       [address] [facility], [address] [place] [address],   [phone]

## 2022-04-05 NOTE — PROGRESS NOTES
Discharge instructions reviewed with pt, verbalized understating. No questions at this time. Declined WC to main lobby. Progress Notes by Jensen Adam NCMA at 03/01/18 08:59 AM     Author:  Jensen Adam NCMA Service:  (none) Author Type:  Certified Medical Assistant     Filed:  03/01/18 08:59 AM Encounter Date:  3/1/2018 Status:  Signed     :  Jensen Adam NCMA (Certified Medical Assistant)            EKG has been performed and sent to provider for reading.    Electronically Signed by:    TIM Hameed , 3/1/2018[MH1.1T]        Revision History        User Key Date/Time User Provider Type Action    > MH1.1 03/01/18 08:59 AM Jensen Adam NCMA Certified Medical Assistant Sign    T - Template

## 2022-04-05 NOTE — DISCHARGE SUMMARY
[facility] EMERGENCY SERVICE Physician Discharge Summary       Jasen Melendrez, [address] [place] [address] [place] [address] Drive  [phone]    On 4/11/2022  Endocrine follow up visit, Monday 4/11 at 1:30pm     [address] [facility]  [phone]  Call  please call to establish physician    Isaura Estrada MD  [address] [address] [facility]          Lora Flowers MD  [address] [facility]  [address] [facility]  [phone]            Activity level: As tolerated    Diet: ADULT DIET; Regular; 4 carb choices (60 gm/meal)  ADULT ORAL NUTRITION SUPPLEMENT; Breakfast, Dinner; Low Calorie/High Protein Oral Supplement    Dispo:Home with self care   Condition on Discharge - stable     Patient ID:  Beth Buckner  [id number]  28 y.o.  1990    Admit date: 4/2/2022    Discharge date and time:  4/5/2022  3:12 PM    Admission Diagnoses: Principal Problem:    DKA, type 1, not at goal [facility])  Active Problems:    History of alcohol abuse    History of pancreatitis    High anion gap metabolic acidosis    Essential hypertension    Elevated transaminase level    Pancreatic mass    Bilateral pulmonary embolism ([facility]    Diabetic ketoacidosis without coma associated with type 2 diabetes mellitus ([facility]  Resolved Problems:    * No resolved hospital problems. *      Discharge Diagnoses: Principal Problem:    DKA, type 1, not at goal [facility])  Active Problems:    History of alcohol abuse    History of pancreatitis    High anion gap metabolic acidosis    Essential hypertension    Elevated transaminase level    Pancreatic mass    Bilateral pulmonary embolism (HCC)    Diabetic ketoacidosis without coma associated with type 2 diabetes mellitus ([facility]  Resolved Problems:    * No resolved hospital problems.  *      Consults:  IP CONSULT TO CRITICAL CARE  IP CONSULT TO ENDOCRINOLOGY  IP CONSULT TO GI  IP CONSULT TO SOCIAL WORK  IP CONSULT TO PULMONOLOGY  IP CONSULT TO DIABETES EDUCATOR        Hospital Course:   He is a 80-year-old male with past medical history of diabetes came to ER with several days of worsening nausea vomiting and upper abdominal pain/discomfort. Work-up in ER with DKA and high anion gap metabolic acidosis, hyponatremia, elevated lactic acid, elevated hydroxybutyrate, elevated transaminases. CT chest with bilateral lower lobe pulmonary emboli and pancreatic mass. He was started on IV insulin as per protocol and admitted in ICU he was managed as below. Critical care, pulmonary, GI, endocrine were following. DKA II  with underlying diabetes- treated with  IV insulin as per protocol-  when gap closed was transitioned to Lantus plus sliding scale. Intensivist and endocrinologist were following. IV fluids and other supportive care was continued. At home he was  on Metformin with Humalog coverage. He  was started on Lantus plus sliding scale and his dose was titrated.  As per  he  is discharged on Lantus 18 units twice daily and mealtime coverage as well as sliding scale. He will follow as outpatient. Lactic acidosis, ketoacidosis, hyponatremia and hypokalemia-secondary to above , on IV fluids, replete as necessary. Now well corrected.     Pancreatic mass secondary to chronic pancreatitis. GI consulted and following. Borderline elevated lipase downtrending . Appereciate GI input. Triphasic CT with calcified tail of pancreas with areas of soft tissue density with heterogenous enhancement. He will need EUS  bx /work up. He will follow with Dr. Deanna Vazquez as outpatient. Their office will arrange for an EUS  and biopsy if needed.     Elevated transaminase- monitor , work up as per GI . Likely sec to etoh use. As per GI serologies neg in 2016. Repeat work up ordered & he  will follow with GI . Will need out pt follow with gi.     Bilateral lower lobe PE -CTA chest findings are not typical of PE as per pulmonary . Was started on Lovenox. D dimer normal x2  , Bilat lower ext us neg for dvt. Wells score 0. Likely does not need anticoag. as per pulmonary does not need anticoagulation so Lovenox was discontinued. Atypical chest pain-secondary to PE, treat supportively     History of alcohol use -was placed on CIWA , thiamine, FA , mvi. He was counseled for alcohol cessation. Outpatient resources given.     History of bipolar disorder-outpatient follow-up. Discharge Exam:  Vitals:    04/04/22 1442 04/04/22 2000 04/05/22 0602 04/05/22 0807   BP: 120/74 122/82  105/69   Pulse: 95 94  90   Resp:  16  16   Temp:  98 °F (36.7 °C)  97.5 °F (36.4 °C)   TempSrc:  Oral  Oral   SpO2:  100%  100%   Weight:   180 lb 12.4 oz (82 kg)    Height:         For PE see progress note dated today    LABS:  Recent Labs     04/03/22 0630 04/04/22 0412 04/05/22  0253    138 139   K 3.4* 3.1* 3.0*    100 103   CO2 21* 29 27   BUN <2* 5* 7   CREATININE 0.5* 0.6* 0.7   GLUCOSE 254* 203* 175*   CALCIUM 8.0* 8.9 8.9       Recent Labs     04/03/22 0630 04/04/22 0412 04/05/22  0253   WBC 2.0* 1.5* 1.9*   RBC 3.81 4.21 4.11   HGB 12.8 14.1 13.8   HCT 34.3* 38.3 38.0   MCV 90.0 91.0 92.5   MCH 33.6 33.5 33.6   MCHC 37.3* 36.8* 36.3*   RDW 12.4 12.4 12.6   PLT 64* 66* 70*   MPV 8.7 9.2 9.6       No results for input(s): POCGLU in the last 72 hours. Imaging:   US DUP LOWER EXTREMITIES BILATERAL VENOUS   Final Result   No evidence of DVT in either lower extremity. CTA TRIPHASIC PANCREAS   Final Result   Located within the tail of the pancreas is a peripherally calcified area of   soft tissue density with heterogeneous enhancement. Multiple considerations   both benign and malignant with correlation of prior inflammation,   pancreatitis. In the absence of vascular etiology most highest consideration   is pancreatic neuroendocrine tumor versus serous cystadenoma. Pseudopapillary neoplasm can be seen more likely in female population   demographic.       RECOMMENDATIONS: GI consultation recommended for further need of workup such as PET-CT and   biochemical values. CTA PULMONARY W CONTRAST   Final Result   Addendum 1 of 1   ADDENDUM:   Critical results were called by Dr. Bijan Swanson MD to Patricio Mcdonnell on   4/2/2022 at 10:50. Final   1. Small nonocclusive segmental filling defects in the lower lobes   compatible with pulmonary pulmonary embolism. Evaluation of the lower lungs   is mildly compromised due to motion artifact. 2.  No pleural fluid or focal pneumonia. 3.  Diffuse hepatic steatosis. Mild splenomegaly. 4.  Peripherally calcified lesion in the pancreatic tail measuring 3.7 x 2.5   x 1.5 cm could represent an old calcified pseudocyst or neoplasm   (pseudopapillary versus mucinous). If there is no known history of   pancreatic disease, triple phase CT of the pancreas or MRI recommended. XR CHEST PORTABLE   Final Result   No acute process. Patient Instructions:      Medication List      START taking these medications    BD Pen Needle Micro U/F 32G X 6 MM Misc  Generic drug: Insulin Pen Needle  Uses with insulin 4 times a day     Blood Glucose Monitoring Suppl Misc  OneTouch ultra Glucometer     blood glucose test strips  One-Touch Ultra strips. Check 4 times/day before meals and at bedtime and as needed for symptoms of irregular blood glucose     famotidine 20 MG tablet  Commonly known as: PEPCID  Take 1 tablet by mouth daily     insulin lispro (1 Unit Dial) 100 UNIT/ML Sopn  Commonly known as: HumaLOG KwikPen  Inject 10 units with meals + following sliding scale. -200 add 2U, -250 add 4U, -300 add 6U, -350 add 8U, -400 add 10U, BS over 400 add 12U.  MAX 40u/day     Lantus SoloStar 100 UNIT/ML injection pen  Generic drug: insulin glargine  Inject 18 Units into the skin 2 times daily     multivitamin Tabs tablet  Take 1 tablet by mouth daily     ONE TOUCH ULTRASOFT LANCETS Misc  Test 4 times/day before meals and at bedtime and as needed for symptoms of irregular blood glucose.         CHANGE how you take these medications    vitamin D 1.25 MG (66405 UT) Caps capsule  Commonly known as: ERGOCALCIFEROL  Take 1 capsule by mouth once a week  What changed: additional instructions        CONTINUE taking these medications    albuterol sulfate  (90 Base) MCG/ACT inhaler  Commonly known as: Ventolin HFA  Inhale 2 puffs into the lungs every 4 hours as needed for Wheezing     budesonide-formoterol 80-4.5 MCG/ACT Aero  Commonly known as: SYMBICORT  Inhale 2 puffs into the lungs 2 times daily     nicotine polacrilex 2 MG lozenge  Commonly known as: COMMIT  Take 1 lozenge by mouth as needed for Smoking cessation     sertraline 25 MG tablet  Commonly known as: ZOLOFT  Take 3 tablets by mouth daily        STOP taking these medications    metFORMIN 1000 MG tablet  Commonly known as: GLUCOPHAGE     prazosin 2 MG capsule  Commonly known as: MINIPRESS           Where to Get Your Medications      These medications were sent to [address] [place] [address] [phone]  [address] [place] [address]    Phone: [phone]   · famotidine 20 MG tablet     These medications were sent to [id number] Trung Beemer, [address] [facility] Andrey Askew [phone]  [address] [facility] [address]    Phone: [phone]   · BD Pen Needle Micro U/F 32G X 6 MM Misc  · Blood Glucose Monitoring Suppl Misc  · blood glucose test strips  · insulin lispro (1 Unit Dial) 100 UNIT/ML Sopn  · Lantus SoloStar 100 UNIT/ML injection pen  · ONE TOUCH ULTRASOFT LANCETS Misc     You can get these medications from any pharmacy    You don't need a prescription for these medications  · multivitamin Tabs tablet           Note that more than 30 minutes was spent in preparing discharge papers, discussing discharge with patient, medication review, etc.    Signed:  Electronically signed by Eli Seals MD on 4/5/2022 at 3:12 PM    NOTE: This report was transcribed using voice recognition software. Every effort was made to ensure accuracy; however, inadvertent computerized transcription errors may be present.

## 2022-04-05 NOTE — PROGRESS NOTES
PROGRESS NOTE        Patient Presents with/Seen in Consultation For      *pancreatic mass/chronic pancreatitis   CHIEF COMPLAINT:  Nausea and vomiting     Subjective:     Patient seen Mandy Nathan in bed in no apparent distress, mother at bedside. Reports mild abdominal pain radiating to back, improved. No nausea or vomiting, tolerating diet. Plan of care discussed with both patient and family who verbalize understanding. Review of Systems  Aside from what was mentioned in the PMH and HPI, essentially unremarkable, all others negative. Objective:     /69   Pulse 90   Temp 97.5 °F (36.4 °C) (Oral)   Resp 16   Ht 5' 7\" (1.702 m)   Wt 180 lb 12.4 oz (82 kg)   SpO2 100%   BMI 28.31 kg/m²     General appearance: alert, awake, laying in bed, and cooperative  Eyes: conjunctiva pale, sclera anicteric. PERRL.   Lungs: clear to auscultation bilaterally  Heart: regular rate and rhythm, no murmur, 2+ pulses; no edema  Abdomen: soft, non-tender; bowel sounds normal; no masses,  no organomegaly  Extremities: extremities without edema  Pulses: 2+ and symmetric  Skin: Skin color, texture, turgor normal.   Neurologic: Grossly normal    insulin glargine (LANTUS) injection vial 18 Units, BID  insulin lispro (HUMALOG) injection vial 10 Units, TID WC  insulin lispro (HUMALOG) injection vial 0-12 Units, TID WC  insulin lispro (HUMALOG) injection vial 0-6 Units, Nightly  lipase-protease-amylase (CREON) delayed release capsule 36,000 Units, TID WC  glucose (GLUTOSE) 40 % oral gel 15 g, PRN  glucagon (rDNA) injection 1 mg, PRN  dextrose 5 % solution, PRN  dextrose bolus (hypoglycemia) 10% 125 mL, PRN   Or  dextrose bolus (hypoglycemia) 10% 250 mL, PRN  polyethylene glycol (GLYCOLAX) packet 17 g, Daily PRN  0.9 % sodium chloride bolus, Once   Followed by  0.9 % sodium chloride infusion, Continuous  HYDROcodone-acetaminophen (NORCO) 5-325 MG per tablet 1 tablet, Q4H PRN   Or  HYDROcodone-acetaminophen (NORCO) 5-325 MG per tablet 2 tablet, Q4H PRN  HYDROmorphone (DILAUDID) injection 0.25 mg, Q3H PRN   Or  HYDROmorphone (DILAUDID) injection 0.5 mg, Q3H PRN  budesonide (PULMICORT) nebulizer suspension 500 mcg, BID  Arformoterol Tartrate (BROVANA) nebulizer solution 15 mcg, BID  dextrose bolus (hypoglycemia) 10% 125 mL, PRN   Or  dextrose bolus (hypoglycemia) 10% 250 mL, PRN  sodium chloride flush 0.9 % injection 5-40 mL, 2 times per day  sodium chloride flush 0.9 % injection 5-40 mL, PRN  0.9 % sodium chloride infusion, PRN  LORazepam (ATIVAN) tablet 1 mg, Q1H PRN   Or  LORazepam (ATIVAN) injection 1 mg, Q1H PRN   Or  LORazepam (ATIVAN) tablet 2 mg, Q1H PRN   Or  LORazepam (ATIVAN) injection 2 mg, Q1H PRN   Or  LORazepam (ATIVAN) tablet 3 mg, Q1H PRN   Or  LORazepam (ATIVAN) injection 3 mg, Q1H PRN   Or  LORazepam (ATIVAN) tablet 4 mg, Q1H PRN   Or  LORazepam (ATIVAN) injection 4 mg, W0G PRN  folic acid (FOLVITE) tablet 1 mg, Daily  enoxaparin (LOVENOX) injection 80 mg, BID  pantoprazole (PROTONIX) tablet 40 mg, QAM AC  multivitamin 1 tablet, Daily  thiamine (B-1) injection 100 mg, Daily  nicotine (NICODERM CQ) 14 MG/24HR 1 patch, Daily         Data Review  CBC:   Lab Results   Component Value Date    WBC 1.9 04/05/2022    RBC 4.11 04/05/2022    HGB 13.8 04/05/2022    HCT 38.0 04/05/2022    MCV 92.5 04/05/2022    MCH 33.6 04/05/2022    MCHC 36.3 04/05/2022    RDW 12.6 04/05/2022    PLT 70 04/05/2022    MPV 9.6 04/05/2022     CMP:    Lab Results   Component Value Date     04/05/2022    K 3.0 04/05/2022    K 3.4 04/02/2022     04/05/2022    CO2 27 04/05/2022    BUN 7 04/05/2022    CREATININE 0.7 04/05/2022    GFRAA >60 04/05/2022    LABGLOM >60 04/05/2022    GLUCOSE 175 04/05/2022    GLUCOSE 84 11/19/2011    PROT 5.4 04/05/2022    LABALBU 3.5 04/05/2022    LABALBU 4.6 11/19/2011    CALCIUM 8.9 04/05/2022    BILITOT 0.4 04/05/2022    ALKPHOS 74 04/05/2022    AST 69 04/05/2022    ALT 63 04/05/2022     Hepatic Function Panel:    Lab Results   Component Value Date    ALKPHOS 74 04/05/2022    ALT 63 04/05/2022    AST 69 04/05/2022    PROT 5.4 04/05/2022    BILITOT 0.4 04/05/2022    BILIDIR <0.2 04/04/2022    IBILI see below 04/04/2022    LABALBU 3.5 04/05/2022    LABALBU 4.6 11/19/2011     No components found for: CHLPL    Lab Results   Component Value Date    TRIG 271 (H) 03/25/2020    TRIG 216 (H) 02/12/2016    TRIG 298 (H) 02/11/2016       Lab Results   Component Value Date    HDL 32 04/04/2022    HDL 25 04/08/2021    HDL 28 03/25/2020       Lab Results   Component Value Date    LDLCALC 108 (H) 04/04/2022    LDLCALC 91 04/08/2021    LDLCALC 92 03/25/2020       Lab Results   Component Value Date    LABVLDL 37 04/04/2022    LABVLDL 24 04/08/2021    LABVLDL 54 03/25/2020      PT/INR:    Lab Results   Component Value Date    PROTIME 11.7 04/05/2022    INR 1.1 04/05/2022     IRON:    Lab Results   Component Value Date    IRON 24 02/10/2016     Iron Saturation:  No components found for: PERCENTFE  FERRITIN:  No results found for: FERRITIN      Assessment:     Active Problems:  ? Pancreatic tail lesion, 18 x 34 x 28 mm  ? Pancreatitis, likely alcohol induced  ? Transaminitis   ? Alcohol abuse  ? Hepatic steatosis   ? Thrombocytopenia   ? DKA- resolved  ? Diabetes- defer to PCP  ? Pulmonary embolism- defer to PCP  ? Hypokalemia      Plan:   ? Prior serology negative in 2016  ? Repeat acute hepatitis panel negative  ? Tumor markers pending   ? CT triphasic pancreas results noted, will need EUS to be arranged as outpatient  ? Protonix 40 mg daily  ? Creon as ordered  ? Alcohol cessation   ? Diet as tolerated  ? Anticoagulants per PCP  ? CIWA per PCP  ? Medical management per PCP to include IVF  ? Medicate for pain or nausea or PCP  ? Discharge per PCP, ok from GI POV will need EUS arranged as outpatient with octreoscan likely to follow, office to arrange. Follow up in our office after EUS.           Note: This report was completed utilizing computer voice recognition software. Every effort has been made to ensure accuracy, however; inadvertent computerized transcription errors may be present.      Discussed with Dr. Loco Holman per Dr. Vi WOODS-NP-C 4/5/2022  10:14 AM For Dr. Fernandez

## 2022-04-05 NOTE — PROGRESS NOTES
CLINICAL PHARMACY NOTE: MEDS TO BEDS    Total # of Prescriptions Filled: 1   The following medications were delivered to the patient:  · Famotidine 20    Additional Documentation:

## 2022-04-05 NOTE — PROGRESS NOTES
Pulmonary Progress Note    Admit Date: 2022  Hospital day                               PCP: Dylan Parnell MD    Chief Complaint (s):  Patient Active Problem List   Diagnosis    History of alcohol abuse    Depression    Bipolar 2 disorder ([facility]    Mild persistent asthma without complication    Allergic rhinitis    History of pancreatitis    High anion gap metabolic acidosis    History of tobacco abuse    Subclinical hypothyroidism    Splenomegaly    Generalized anxiety disorder    Essential hypertension    Diabetes mellitus ([facility]    Alcohol abuse    Severe episode of recurrent major depressive disorder, without psychotic features ([facility]    PTSD (post-traumatic stress disorder)    DKA, type 1, not at goal [facility])    Elevated transaminase level    Pancreatic mass    Bilateral pulmonary embolism ([facility]    Diabetic ketoacidosis without coma associated with type 2 diabetes mellitus ([facility]       Subjective:  · Patietn seen resting in bed on room air. He has occasional chest pain with deep breaths. Otherwise, no breathing complaints nor any bilateral lower extremity swelling.        Vitals:  VITALS:  /69   Pulse 90   Temp 97.5 °F (36.4 °C) (Oral)   Resp 16   Ht 5' 7\" (1.702 m)   Wt 180 lb 12.4 oz (82 kg)   SpO2 100%   BMI 28.31 kg/m²     24HR INTAKE/OUTPUT:    No intake or output data in the 24 hours ending 22 1128    24HR PULSE OXIMETRY RANGE:    SpO2  Av %  Min: 100 %  Max: 100 %    Medications:  IV:   dextrose      sodium chloride      sodium chloride         Scheduled Meds:   insulin glargine  18 Units SubCUTAneous BID    insulin lispro  10 Units SubCUTAneous TID WC    insulin lispro  0-12 Units SubCUTAneous TID WC    insulin lispro  0-6 Units SubCUTAneous Nightly    lipase-protease-amylase  36,000 Units Oral TID WC    sodium chloride  15 mL/kg IntraVENous Once    budesonide  0.5 mg Nebulization BID    Arformoterol Tartrate  15 mcg Nebulization BID    sodium chloride flush  5-40 mL IntraVENous 2 times per day    folic acid  1 mg Oral Daily    enoxaparin  1 mg/kg SubCUTAneous BID    pantoprazole  40 mg Oral QAM AC    multivitamin  1 tablet Oral Daily    thiamine  100 mg IntraVENous Daily    nicotine  1 patch TransDERmal Daily       Diet:   ADULT DIET; Regular; 4 carb choices (60 gm/meal)  ADULT ORAL NUTRITION SUPPLEMENT; Breakfast, Dinner; Low Calorie/High Protein Oral Supplement     EXAM:  General: No distress. Alert. Eyes: PERRL. No sclera icterus. No conjunctival injection. ENT: No discharge. Pharynx clear. Neck: Trachea midline. Normal thyroid. Resp: No accessory muscle use. no rales. no wheezing. no rhonchi. CV: Regular rate. Regular rhythm. No murmur or rub. Abd: Non-tender. Non-distended. No masses. No organomegaly. Normal bowel sounds. Skin: Warm and dry. No nodule on exposed extremities. No rash on exposed extremities. Ext: No cyanosis, clubbing, edema  Lymph: No cervical LAD. No supraclavicular LAD. M/S: No cyanosis. No joint deformity. No clubbing. Neuro: Awake. Follows commands. Positive pupils/gag/corneals. Normal pain response. Results:  CBC:   Recent Labs     04/03/22 0630 04/04/22 0412 04/05/22 0253   WBC 2.0* 1.5* 1.9*   HGB 12.8 14.1 13.8   HCT 34.3* 38.3 38.0   MCV 90.0 91.0 92.5   PLT 64* 66* 70*     BMP:   Recent Labs     04/03/22 0630 04/04/22 0412 04/05/22 0253    138 139   K 3.4* 3.1* 3.0*    100 103   CO2 21* 29 27   PHOS 3.4 3.6 4.1   BUN <2* 5* 7   CREATININE 0.5* 0.6* 0.7     LIVER PROFILE:   Recent Labs     04/04/22 0412 04/05/22 0253   AST 32 69*   ALT 48* 63*   LIPASE 32  --    BILIDIR <0.2  --    BILITOT 0.4 0.4   ALKPHOS 79 74     PT/INR:   Recent Labs     04/05/22 0253   PROTIME 11.7   INR 1.1     APTT: No results for input(s): APTT in the last 72 hours. Pathology:  1. N/A      Microbiology:  1.  None    Recent ABG:   No results for input(s): PH, PO2, PCO2, HCO3, BE, O2SAT, METHB, O2HB, COHB, O2CON, HHB, THB in the last 72 hours. Recent Films:  US DUP LOWER EXTREMITIES BILATERAL VENOUS   Final Result   No evidence of DVT in either lower extremity. CTA TRIPHASIC PANCREAS   Final Result   Located within the tail of the pancreas is a peripherally calcified area of   soft tissue density with heterogeneous enhancement. Multiple considerations   both benign and malignant with correlation of prior inflammation,   pancreatitis. In the absence of vascular etiology most highest consideration   is pancreatic neuroendocrine tumor versus serous cystadenoma. Pseudopapillary neoplasm can be seen more likely in female population   demographic. RECOMMENDATIONS:   GI consultation recommended for further need of workup such as PET-CT and   biochemical values. CTA PULMONARY W CONTRAST   Final Result   Addendum 1 of 1   ADDENDUM:   Critical results were called by Dr. Loco Curiel MD to Ry Mckeon on   4/2/2022 at 10:50. Final   1. Small nonocclusive segmental filling defects in the lower lobes   compatible with pulmonary pulmonary embolism. Evaluation of the lower lungs   is mildly compromised due to motion artifact. 2.  No pleural fluid or focal pneumonia. 3.  Diffuse hepatic steatosis. Mild splenomegaly. 4.  Peripherally calcified lesion in the pancreatic tail measuring 3.7 x 2.5   x 1.5 cm could represent an old calcified pseudocyst or neoplasm   (pseudopapillary versus mucinous). If there is no known history of   pancreatic disease, triple phase CT of the pancreas or MRI recommended. XR CHEST PORTABLE   Final Result   No acute process. Assessment:  1. Abnormal CT scan of the chest: Findings are not typical of pulmonary embolism as seen above with no significant occlusion seen. D-dimer is noted to be low. Lower extremities are unremarkable and the patient's Wells score is 0.   Findings are all not consistent with pulmonary embolism. Ultrasound of lower extremities negative for deep venous thrombosis. D-dimer <200.        Plan:  1. Okay to stop lovenox. 2. Okay to discharge from a pulmonary perspective. 3. The patient needs to stop smoking and should be on a combination LABA/ICS upon discharge with an albuterol rescue inhaler. Time at the bedside, reviewing labs and radiographs, reviewing updated notes and consultations, discussing with staff and family was more than 35 minutes. Please note that voice recognition technology was used in the preparation of this note and make therefore it may contain inadvertent transcription errors. If the patient is a COVID 19 isolation patient, the above physical exam reflects that of the examining physician for the day. CHUCK Angel - DEMETRIUS  Attestation     I have discussed the case, including pertinent history and exam findings with the nurse practitioner. I have reviewed meds and pertinent labs and the key elements of the encounter have been performed by me. I agree with the assessment, plan and orders as documented by the nurse practitioner. Additions and corrections are reflected in the signed note. Melissa Garza MD,  M.D., F.C.C.P.     [facility], [address] [place], [address], [facility] - [facility]

## 2022-04-05 NOTE — PROGRESS NOTES
Upon attempting to discharge pt, pt verbalized concern that [address] in [facility] called and told him his insulin would cost around $800 and the needles were an additional $100. Stated that this was a problem as he could not afford them. Charge RN made aware.

## 2022-04-06 NOTE — TELEPHONE ENCOUNTER
Delmy 45 Transitions Initial Follow Up Call    Outreach made within 2 business days of discharge: Yes    Patient: Kortney Singh Patient : 1990   MRN: [id number]  Reason for Admission: DKA Discharge Date: 22           Message left for Sarah Ray to return the call to Jair AHN at [facility] to make a hospital follow up appointment with Dr. Mabel Lee within the next 1-2 weeks. Ask for Jair when returning the call. Patient qualifies for TCM visit.     Follow Up  Future Appointments   Date Time Provider Alhaji Deleon   2022  1:30 PM CHUCK Ren - DEMET[facility]US BDM ENDO [facility]       Reji Hinojosa RN

## 2022-04-07 NOTE — CARE COORDINATION
Delmy 45 Transitions Initial Follow Up Call    Call within 2 business days of discharge: Yes    Patient: James Pham Patient : 1990   MRN: [id number]  Reason for Admission: chrissy -2022 diabetic ketoacidosis   Discharge Date: 22 RARS: Readmission Risk Score: 11.1 ( )      Last Discharge [facility]       Complaint Diagnosis Description Type Department Provider    22 Hyperglycemia; Nausea; Emesis Diabetic ketoacidosis without coma associated with diabetes mellitus due to underlying condition ([facility] [address] . .. ED to Hosp-Admission (Discharged) (ADMITTED) CHRISSY 7W Tracey Newton MD; William Rivera. .. Spoke with: left hipaa complaint message for pt with call back info. Updating pt to follow up with pcp for further needs.      Facility: [facility]     Care Transitions 24 Hour Call    Care Transitions Interventions         Follow Up  Future Appointments   Date Time Provider Alhaji Deleon   2022  1:30  [address], APRN - NP BDM ENDO [facility]       Natali Montana LPN

## 2022-04-11 NOTE — PROGRESS NOTES
[address] [facility] Department of Endocrinology Diabetes and Metabolism   [address] [facility] [address]   Phone: [phone]  Fax: [phone]    Date of Service: 4/11/2022    Primary Care Physician: Lacho Cooney MD  Referring physician: No ref. provider found  Provider: CHUCK Lucio NP     Reason for the visit:    DM type 1, Hospital f/u    History of Present Illness: The history is provided by the patient. No  was used. Accuracy of the patient data is excellent. Rocoi Ayala is a very pleasant 28 y.o. male seen today for diabetes management. He was recently admitted to [facility] from 4/2/22-4/5/22 with DKA, type 1. During his hospitalization a CT chest showed bilateral lower lobe pulmonary emboli and pancreatic mass 2/2 chronic pancreatitis. HX of alcohol abuse (daily). Rocio Ayala was diagnosed with diabetes at age 30 and currently on Lyumjev 6 units + medium SS with meals, Glargine 18 units BID    Prior to hospitalization, he was on  Metformin with Humalog coverage. Hx of noncompliance  History of pancreatitis    The patient has been checking blood sugar 4x a day via fingerstick  He did not bring his meter today  Per recall, varying 215-230 with a high of > 300  Pre lunch 160-180, pre dinner 160's    A1c today in office is 9.2% .       Most recent A1c results summarized below  Lab Results   Component Value Date    LABA1C 9.2 04/11/2022    LABA1C 6.9 04/08/2021    LABA1C 5.3 10/27/2020     Patient has had no hypoglycemic episodes   The patient has been mindful of what has been eating and following diabetic diet as encouraged  I reviewed current medications and the patient has no issues with diabetes medications  The patient is due for an eye exam. No h/o diabetic retinopathy  The patient performs his own feet care  Microvascular complications:  No Retinopathy, Nephropathy or Neuropathy   Macrovascular complications: no CAD, PVD, or Stroke    PAST MEDICAL HISTORY   Past Medical History:   Diagnosis Date    Alcohol abuse     Allergic rhinitis 3/9/2015    Anxiety     Asthma     Bipolar 2 disorder ([facility] [address] 3/9/2015    Depression     Diabetes mellitus ([facility] [address]     Generalized anxiety disorder 3/4/2016    With agoraphobia    High anion gap metabolic acidosis 2/74/4518    History of tobacco abuse 2/25/2016    Plantar fasciitis of left foot 3/3/2016    Prolonged INR 2/19/2016    Splenomegaly 3/4/2016    US 3/2016       PAST SURGICAL HISTORY   Past Surgical History:   Procedure Laterality Date    BRONCHOSCOPY  02/16/2016       SOCIAL HISTORY   Tobacco:   reports that he has been smoking cigarettes. He has a 2.75 pack-year smoking history. He has quit using smokeless tobacco.  Alcohol:   reports current alcohol use of about 40.0 standard drinks of alcohol per week. Drugs:   reports current drug use. Frequency: 1.00 time per week. Drug: Marijuana Madiha Ing). FAMILY HISTORY   Family History   Problem Relation Age of Onset    No Known Problems Mother     Other Father         bipolar       ALLERGIES AND DRUG REACTIONS   No Known Allergies    CURRENT MEDICATIONS   Current Outpatient Medications   Medication Sig Dispense Refill    Continuous Blood Gluc  (FREESTYLE LOIS 2 READER) JOHNIE For BS monitoring 1 each 0    Continuous Blood Gluc Sensor (FREESTYLE LOIS 2 SENSOR) MISC Apply every 14 days 3 each 3    Insulin Pen Needle (BD PEN NEEDLE MICRO U/F) 32G X 6 MM MISC Uses with insulin 4 times a day 250 each 5    Blood Glucose Monitoring Suppl MISC OneTouch ultra Glucometer 1 each 0    ONE TOUCH ULTRASOFT LANCETS MISC Test 4 times/day before meals and at bedtime and as needed for symptoms of irregular blood glucose. 250 each 5    blood glucose monitor strips One-Touch Ultra strips.  Check 4 times/day before meals and at bedtime and as needed for symptoms of irregular blood glucose 250 strip 5    Multiple Vitamin (MULTIVITAMIN) TABS tablet Take 1 tablet by mouth daily  1    famotidine (PEPCID) 20 MG tablet Take 1 tablet by mouth daily 60 tablet 1    Insulin Lispro-aabc (LYUMJEV) 100 UNIT/ML SOLN Inject 10 units with meals + following sliding scale. -200 add 2U, -250 add 4U, -300 add 6U, -350 add 8U, -400 add 10U, BS over 400 add 12U. MAX 40u/day 30 mL 3    Insulin Glargine, 2 Unit Dial, (TOUJEO MAX SOLOSTAR) 300 UNIT/ML SOPN Inject 18 Units into the skin in the morning and at bedtime 4 pen 3    albuterol sulfate HFA (VENTOLIN HFA) 108 (90 Base) MCG/ACT inhaler Inhale 2 puffs into the lungs every 4 hours as needed for Wheezing 1 Inhaler 3    vitamin D (ERGOCALCIFEROL) 1.25 MG (41523 UT) CAPS capsule Take 1 capsule by mouth once a week (Patient taking differently: Take 50,000 Units by mouth once a week Last taken 4/3/21) 12 capsule 1    budesonide-formoterol (SYMBICORT) 80-4.5 MCG/ACT AERO Inhale 2 puffs into the lungs 2 times daily 1 Inhaler 3    nicotine polacrilex (COMMIT) 2 MG lozenge Take 1 lozenge by mouth as needed for Smoking cessation 100 each 3    sertraline (ZOLOFT) 25 MG tablet Take 3 tablets by mouth daily 90 tablet 0     No current facility-administered medications for this visit. Review of Systems  Constitutional: No fever, no chills, no diaphoresis, no generalized weakness. HEENT: No blurred vision, No sore throat, no ear pain, no hair loss  Neck: denied any neck swelling, difficulty swallowing,   Cardio-pulmonary: No CP, SOB or palpitation, No orthopnea or PND. No cough or wheezing. GI: No N/V/D, no constipation, No abdominal pain, no melena or hematochezia   : Denied any dysuria, hematuria, flank pain, discharge, or incontinence. Skin: denied any rash, ulcer, Hirsute, or hyperpigmentation. MSK: denied any joint deformity, joint pain/swelling, muscle pain, or back pain.   Neuro: no numbness, no tingling, no weakness    OBJECTIVE    BP (!) 146/85   Pulse 105   Ht 5' 7\" (1.702 m)   Wt 191 lb (86.6 kg)   SpO2 98%   BMI 29.91 kg/m²   BP Readings from Last 4 Encounters:   04/11/22 (!) 146/85   04/05/22 105/69   03/09/21 (!) 146/92   03/03/21 (!) 158/87     Wt Readings from Last 6 Encounters:   04/11/22 191 lb (86.6 kg)   04/05/22 180 lb 12.4 oz (82 kg)   03/09/21 214 lb 9.6 oz (97.3 kg)   03/03/21 212 lb (96.2 kg)   03/03/21 216 lb (98 kg)   10/27/20 198 lb (89.8 kg)       Physical examination:  General: awake alert, oriented x3, no abnormal position or movements. HEENT: normocephalic non-traumatic, no exophthalmos   Neck: supple, no LN enlargement, no thyromegaly, no thyroid tenderness, no JVD. Pulm: Clear equal air entry no added sounds, no wheezing or rhonchi    CVS: S1 + S2, no murmur, no heave. Dorsalis pedis pulse palpable   Abd: soft lax, no tenderness, no organomegaly, audible bowel sounds. Skin: warm, no lesions, no rash.  No callus, no Ulcers, No acanthosis nigricans  Musculoskeletal: No back tenderness, no kyphosis/scoliosis    Neuro: CN intact, sensation present bilateral , muscle power normal  Psych: normal mood, and affect      Review of Laboratory Data:  I personally reviewed the following lab:  Lab Results   Component Value Date/Time    WBC 1.9 (L) 04/05/2022 02:53 AM    RBC 4.11 04/05/2022 02:53 AM    HGB 13.8 04/05/2022 02:53 AM    HCT 38.0 04/05/2022 02:53 AM    MCV 92.5 04/05/2022 02:53 AM    MCH 33.6 04/05/2022 02:53 AM    MCHC 36.3 (H) 04/05/2022 02:53 AM    RDW 12.6 04/05/2022 02:53 AM    PLT 70 (L) 04/05/2022 02:53 AM    MPV 9.6 04/05/2022 02:53 AM      Lab Results   Component Value Date/Time     04/05/2022 02:53 AM    K 3.0 (L) 04/05/2022 02:53 AM    K 3.4 (L) 04/02/2022 08:00 AM    CO2 27 04/05/2022 02:53 AM    BUN 7 04/05/2022 02:53 AM    CREATININE 0.7 04/05/2022 02:53 AM    CALCIUM 8.9 04/05/2022 02:53 AM    LABGLOM >60 04/05/2022 02:53 AM    GFRAA >60 04/05/2022 02:53 AM      Lab Results   Component Value Date/Time    TSH 2.970 04/02/2022 08:00 AM    T4FREE 1.28 02/02/2016 08:41 AM     Lab Results   Component Value Date    LABA1C 9.2 04/11/2022    GLUCOSE 175 04/05/2022    GLUCOSE 84 11/19/2011    MALBCR <30 03/25/2020    LABCREA 230 02/07/2016     Lab Results   Component Value Date    LABA1C 9.2 04/11/2022    LABA1C 6.9 04/08/2021    LABA1C 5.3 10/27/2020     Lab Results   Component Value Date    TRIG 271 03/25/2020    HDL 32 04/04/2022    LDLCALC 108 04/04/2022    CHOL 174 03/25/2020     Lab Results   Component Value Date    VITD25 10 03/25/2020    VITD25 <5 02/02/2016       ASSESSMENT & RECOMMENDATIONS   Matteo Patton, a 28 y.o.-old male seen in for the following issues       Assessment:      Diagnosis Orders   1. Poorly controlled diabetes mellitus ([address]  POCT glycosylated hemoglobin (Hb A1C)    Continuous Blood Gluc  (FREESTYLE LOIS 2 READER) JOHNIE    Continuous Blood Gluc Sensor (FREESTYLE LOIS 2 SENSOR) MISC   2. Dietary noncompliance     3. Vitamin D deficiency  Vitamin D 25 Hydroxy   4. Alcohol-induced acute pancreatitis without infection or necrosis         Plan:     1. Poorly controlled diabetes mellitus ([address]   · Patient's diabetes is improving per recall  · Will change DM regimen to Lyumjev 7 units + medium SS with meals, Glargine 20 units BID  · The patient was advised to check blood sugars 4 times a day before meals and at bedtime and send BS readings to our office in a week.   · Will order Chris Anderson for CGM  · Discussed with patient A1c and blood sugar goals   · Please call office if BS  < 70 as he is becoming more adherent to his diet  · Optimal blood sugars: 100-140 pre-prandial, < 180 peak post-prandial  · The patient counseled about the complications of uncontrolled diabetes   · Patient was counselled about the importance of self-blood glucose monitoring and eating consistent carb diet to avoid blood sugar fluctuations   · Patient will need routine diabetes maintenance and prevention  · The patient was referred to diabetic educator for further teaching - pt refused  · Discussed lifestyle changes including diet and exercise with patient; recommended 150 minutes of moderate intensity exercise per week. · Diabetes labs before next visit      2. Dietary noncompliance   · Improving   Discussed with patient the importance of eating consistent carbohydrate meals, avoiding high glycemic index food. Also, discussed with patient the risk and negative consequences of dietary noncompliance on blood glucose control, blood pressure and weight     3. Vitamin D deficiency   · On replacement   · Will check level at next OV     4. Alcohol-induced acute pancreatitis without infection or necrosis   · Improving per pt recall  · Management per primary and GI service   · With h/o Pancreatitis both GLP-1 agonist and DDP-4 inhibiotrs are contraindicated  · Discussed the risk of lactic acidosis with using Metformin and alcohol abuse     · Pt reminded to stop alcohol consumption         I personally spent > 30 minutes reviewing  external notes from PCP and other patient's care team providers, and personally interpreted labs associated with the above diagnosis. I also ordered labs to further assess and manage the above addressed medical conditions. Return in about 3 months (around 7/11/2022). The above issues were reviewed with the patient who understood and agreed with the plan. Thank you for allowing us to participate in the care of this patient. Please do not hesitate to contact us with any additional questions. CHUCK Colon NP    [facility] Diabetes Care and Endocrinology   [address] [facility] [address]   Phone: [phone]  Fax: [phone]  --------------------------------------------  An electronic signature was used to authenticate this note.  CHUCK Colon NP on 4/11/2022 at 1:52 PM

## 2022-06-29 NOTE — CODE DOCUMENTATION
1108 - Pt arrived with CPR in progress. 1110 - EPI given  1111 - Bicarb and Calcium  1113 - Pulse check, asystole  1114 - EPI given  1115 - Pulse check, asystole  1116 - Pt intubated  1116 - Bicarb given  1118 - Pt remains asystole. Dr. Elda Starr pronounced the patient dead.

## 2022-06-29 NOTE — ED NOTES
Russel Gonzalez from [facility] here, picked patient up.      Scotty Eden RN  06/29/22 2135

## 2022-06-29 NOTE — ED PROVIDER NOTES
[address]  Department of Emergency Medicine   ED  Encounter Note  Admit Date/RoomTime: 2022 11:13 AM  ED Room:     NAME: Arnaldo Lynch  : 1990  MRN: [id number]     Chief Complaint:  Cardiac Arrest    History of Present Illness   Source of history provided by:  EMS personnel. History/Exam Limitations: presenting condition. Arnaldo Lynch is a 33 y.o. old male presents to the emergency department by ambulance where the patient received epi prior to arrival., for cardiac arrest, which occured unknown time prior to arrival.  The event was witnessed by no one. Down time from arrest until ambulance arrival unknown. Total Time from arrest until hospital arrival unknown. Patient initial rhythm was: asystole. Code Status on file: Prior. Available History:  POC Glucose 500 PTA    Prehospital Treatment:      [x]   CPR     []   Intubation     [x]   IV Established     []   Defibrillation     []   External Pacer Placed     [x]   Epinephrine Given     []   Atropine Given     []   Glucose Given     []   Narcan Given       Response to Treatment:  Transient return of pulse: No.                                            Sustained return of pulse:  No.    ROS    Pertinent positives and negatives are stated within HPI, all other systems reviewed and are negative. Past Medical History:  has a past medical history of Alcohol abuse, Allergic rhinitis, Anxiety, Asthma, Bipolar 2 disorder ([address] Depression, Diabetes mellitus ([address] Generalized anxiety disorder, High anion gap metabolic acidosis, History of tobacco abuse, Plantar fasciitis of left foot, Prolonged INR, and Splenomegaly. Surgical History:  has a past surgical history that includes bronchoscopy (2016). Social History:  reports that he has been smoking cigarettes. He has a 2.75 pack-year smoking history.  He has quit using smokeless tobacco. He reports current alcohol use of about 40.0 standard drinks of alcohol per week. He reports current drug use. Frequency: 1.00 time per week. Drug: Marijuana Vahe Horta). Family History: family history includes No Known Problems in his mother; Other in his father. Allergies: Patient has no known allergies. Physical Exam   Oxygen Saturation Interpretation: Abnormal.        ED Triage Vitals   BP Temp Temp src Pulse Resp SpO2 Height Weight   -- -- -- -- -- -- -- --         General: Unresponsive. Head: Atraumatic. Eyes: Fixed and dilated. ENT: Airway patent. Gabriele Caity in place, ETT Placed on exam  Cardiovascular: Heart sounds are Absent. Pulses are Absent. Respiratory: No spontaneous respirations. Equal breath sounds with controlled ventilation. Abdominal: nondistended  Musculoskeletal: No deformities. Left IV attempts, left LE IO   Skin: Pallor. Neurological: Unresponsive. Neurological exam limited due to clinical condition. Lab / Imaging Results   (All laboratory and radiology results have been personally reviewed by myself)  Labs:  No results found for this visit on 06/29/22. Imaging: All Radiology results interpreted by Radiologist unless otherwise noted. No orders to display   . ED Course / Medical Decision Making   Medications - No data to display     Consultations:             's referral    Procedures:   Intubation Procedure Note    Indication: comatose state    Consent: Unable to be obtained due to the emergent nature of this procedure. Medications Used: None    Procedure: The patient was placed in the appropriate position. Cricoid pressure was not required. Intubation was performed by indirect laryngoscopy using a bronchoscope and a 7.5 cuffed endotracheal tube. The cuff was then inflated and the tube was secured appropriately at a distance of 24 cm to the dental ridge. Initial confirmation of placement included bilateral breath sounds, absence of sounds over the stomach, tube fogging and adequate chest rise.   A chest arrest ([facility]   New Prescriptions    No medications on file     DO Miguelito Fleming DO  06/29/22 1139

## 2022-06-29 NOTE — PROCEDURES
Procedure done during code.   Dr Rosenbaum Form has filed note  Electronically signed by Martha Lopez MD on 6/29/22 at 11:38 AM EDT

## 2022-06-29 NOTE — CARE COORDINATION
Social Work /Transition of Care:    Pt presented to the ED via EMS secondary to cardiac arrest at home. Per EMS, pt's mother found pt unresponsive at home and the last time anyone saw pt was 10pm last evening. TODAYANARA  27  ED physician and JEREL met with pt's mother and stepfather in ED waiting room. Pt's parents left immediately after being informed of pt's death. Pt is a 's case. Pt's mother returned call to JEREL and stated she would like pt picked up by Luis in [facility]. JEREL informed pt's mother that pt is a 's case and the  will initially be picking pt up. JEREL provided pt's mother with phone number to [facility].